# Patient Record
Sex: MALE | Race: WHITE | NOT HISPANIC OR LATINO | Employment: FULL TIME | URBAN - NONMETROPOLITAN AREA
[De-identification: names, ages, dates, MRNs, and addresses within clinical notes are randomized per-mention and may not be internally consistent; named-entity substitution may affect disease eponyms.]

---

## 2019-11-21 ENCOUNTER — CONSULT (OUTPATIENT)
Dept: CARDIOLOGY | Facility: CLINIC | Age: 79
End: 2019-11-21

## 2019-11-21 VITALS
BODY MASS INDEX: 27.62 KG/M2 | HEIGHT: 67 IN | SYSTOLIC BLOOD PRESSURE: 140 MMHG | OXYGEN SATURATION: 99 % | HEART RATE: 74 BPM | DIASTOLIC BLOOD PRESSURE: 74 MMHG | WEIGHT: 176 LBS

## 2019-11-21 DIAGNOSIS — R55 SYNCOPE AND COLLAPSE: Primary | ICD-10-CM

## 2019-11-21 DIAGNOSIS — I50.32 CHRONIC DIASTOLIC HEART FAILURE (HCC): ICD-10-CM

## 2019-11-21 DIAGNOSIS — I10 HYPERTENSION, UNSPECIFIED TYPE: ICD-10-CM

## 2019-11-21 PROCEDURE — 99204 OFFICE O/P NEW MOD 45 MIN: CPT | Performed by: INTERNAL MEDICINE

## 2019-11-21 PROCEDURE — 93000 ELECTROCARDIOGRAM COMPLETE: CPT | Performed by: INTERNAL MEDICINE

## 2019-11-21 RX ORDER — SPIRONOLACTONE 25 MG/1
25 TABLET ORAL DAILY
Refills: 0 | COMMUNITY
Start: 2019-09-30 | End: 2020-02-27

## 2019-11-21 RX ORDER — LISINOPRIL 40 MG/1
40 TABLET ORAL DAILY
Refills: 1 | COMMUNITY
Start: 2019-10-24 | End: 2020-02-27 | Stop reason: SDUPTHER

## 2019-11-21 RX ORDER — SODIUM, POTASSIUM,MAG SULFATES 17.5-3.13G
SOLUTION, RECONSTITUTED, ORAL ORAL
Refills: 0 | COMMUNITY
Start: 2019-11-18 | End: 2021-08-26 | Stop reason: ALTCHOICE

## 2019-11-21 RX ORDER — HYDRALAZINE HYDROCHLORIDE 25 MG/1
25 TABLET, FILM COATED ORAL 2 TIMES DAILY
Refills: 1 | COMMUNITY
Start: 2019-11-15 | End: 2019-11-21

## 2019-11-21 RX ORDER — AMLODIPINE BESYLATE 5 MG/1
5 TABLET ORAL DAILY
Refills: 0 | COMMUNITY
Start: 2019-09-23 | End: 2020-02-27 | Stop reason: SDUPTHER

## 2019-11-21 NOTE — PROGRESS NOTES
[facility] Cardiology OP Consult Note    Abebe Crockett  [id number]  2019    Referred By: Ricardo Staples MD    Chief Complaint: Syncope    History of Present Illness:   Mr. Abebe Crockett is a 79 y.o. male who presents to the Cardiology Clinic for evaluation of syncope.  The patient has a past medical history for hypertension, dyslipidemia, ARIELLA, and anemia.  He has a reported cardiac history significant for chronic diastolic heart failure, however on further questioning the patient denies knowledge of a diagnosis of heart failure.  He does, however, report seeing a cardiologist in the past for management of hypertension.  He presents to the [facility] cardiology clinic today to reestablish primary cardiac care.  The patient reports a history of recurrent syncope, with his last syncopal episode being approximately 1 week ago.  Prior to his most recent episode, he reports several syncopal episodes which occurred several years ago.  Patient reports his most recent syncopal episode occurred while standing.  The patient has been standing for approximately 30 minutes, when he became dizzy and lightheaded.  He subsequently syncopized.  No reported seizure-like activity.  Since that time, he has been decreasing his dose of hydralazine and he has not had any further syncopal episodes.  He denies symptoms to suggest orthostasis.  He reports mild chronic exertional dyspnea, however no exertional chest pain or chest discomfort.  He denies orthopnea.  No significant lower extremity swelling.  No episodes of palpitations.  He has no other complaints at this time.    Past Cardiac Testin. Last Coronary Angio: None  2. Prior Stress Testing: None  3. Last Echo: Remote, report unavailable  4. Prior Holter Monitor: None    Review of Systems:   Review of Systems   Constitutional: Negative for activity change, appetite change, chills, diaphoresis, fatigue, fever, unexpected weight gain and unexpected  weight loss.   Respiratory: Positive for shortness of breath. Negative for cough, chest tightness and wheezing.    Cardiovascular: Negative for chest pain, palpitations and leg swelling.   Gastrointestinal: Negative for abdominal pain, anal bleeding, blood in stool and GERD.   Endocrine: Negative for cold intolerance and heat intolerance.   Genitourinary: Negative for hematuria.   Neurological: Positive for syncope. Negative for dizziness, weakness and light-headedness.   Hematological: Does not bruise/bleed easily.   Psychiatric/Behavioral: Negative for depressed mood and stress. The patient is not nervous/anxious.        Past Medical History:   Past Medical History:   Diagnosis Date   • Hypertension        Past Surgical History: History reviewed. No pertinent surgical history.    Family History:   Family History   Problem Relation Age of Onset   • Cancer Father        Social History:   Social History     Socioeconomic History   • Marital status:      Spouse name: Not on file   • Number of children: Not on file   • Years of education: Not on file   • Highest education level: Not on file   Tobacco Use   • Smoking status: Former Smoker     Last attempt to quit: 1965     Years since quittin.9   • Smokeless tobacco: Never Used   Substance and Sexual Activity   • Alcohol use: No     Frequency: Never   • Drug use: No   • Sexual activity: Defer       Medications:     Current Outpatient Medications:   •  amLODIPine (NORVASC) 5 MG tablet, Take 5 mg by mouth Daily., Disp: , Rfl: 0  •  ASPIRIN 81 PO, Take  by mouth., Disp: , Rfl:   •  BISOPROLOL FUMARATE PO, Take 10 mg by mouth., Disp: , Rfl:   •  lisinopril (PRINIVIL,ZESTRIL) 40 MG tablet, Take 40 mg by mouth Daily., Disp: , Rfl: 1  •  NON FORMULARY, , Disp: , Rfl:   •  NON FORMULARY, , Disp: , Rfl:   •  spironolactone (ALDACTONE) 25 MG tablet, Take 25 mg by mouth Daily., Disp: , Rfl: 0  •  SUPREP BOWEL PREP KIT 17.5-3.13-1.6 GM/177ML solution oral solution, TAKE  "AS DIRECTED PER YOUR PHYSICIANS INSTRUCTIONS, Disp: , Rfl: 0    Allergies:   No Known Allergies    Physical Exam:  Vital Signs:   Vitals:    11/21/19 1033 11/21/19 1037   BP: 122/74 140/74   BP Location: Right arm Left arm   Patient Position: Sitting Sitting   Cuff Size: Adult Adult   Pulse: 74    SpO2: 99%    Weight: 79.8 kg (176 lb)    Height: 170.2 cm (67\")        Physical Exam   Constitutional: He is oriented to person, place, and time. He appears well-developed and well-nourished. No distress.   HENT:   Head: Normocephalic and atraumatic.   Moist Mucous Membranes.    Eyes: EOM are normal. Pupils are equal, round, and reactive to light. No scleral icterus.   Neck: No tracheal deviation present.   Cardiovascular: Normal rate, regular rhythm, normal heart sounds and intact distal pulses. Exam reveals no gallop and no friction rub.   No murmur heard.  Normal JVD.  No lower extremity edema.   Pulmonary/Chest: Effort normal and breath sounds normal. No stridor. No respiratory distress. He has no wheezes. He has no rales. He exhibits no tenderness.   Abdominal: Soft. Bowel sounds are normal. He exhibits no distension. There is no tenderness. There is no rebound and no guarding.   Musculoskeletal: Normal range of motion. He exhibits no edema.   Lymphadenopathy:     He has no cervical adenopathy.   Neurological: He is alert and oriented to person, place, and time.   Skin: Skin is warm and dry. He is not diaphoretic. No erythema.   Psychiatric: He has a normal mood and affect. His behavior is normal.       Results Review:   I reviewed the patient's new clinical results.      ECG 12 Lead  Date/Time: 11/21/2019 12:34 PM  Performed by: Adeel Myles MD  Authorized by: Adeel Myles MD   Comparison: not compared with previous ECG   Rhythm: sinus rhythm  Ectopy: atrial premature contractions  Other findings: non-specific ST-T wave changes    Clinical impression: abnormal EKG            Assessment / Plan:     1.  " Syncope and collapse  --History of recurrent syncope, one recent episode in the remote is been remote several years ago  --Underlying etiology currently unclear  --ECG without significant AV conduction abnormalities  --Will obtain echocardiogram to evaluate LVEF and rule out valvulopathy  --At this point, potential etiologies include orthostatic, vagally mediated, possible arrhythmia  --If echocardiogram is unremarkable and the patient has recurrent episodes, will then pursue outpatient cardiac monitoring  --Titrate antihypertensives, as below  --Follow-up in 3 months, or sooner if required    2.  Hypertension  --BP appears to be well controlled on home BP checks  --Will continue amlodipine, Aldactone, lisinopril  --Discontinue hydralazine  --If the patient is hypertensive after discontinuing hydralazine, would then uptitrate amlodipine    3.  Chronic diastolic heart failure  --Unclear if the patient has a history of diastolic heart failure, given he denies knowledge of the diagnosis, no hospitalizations for fluid overload  --Currently appears to be euvolemic on exam  --Will obtain echocardiogram to evaluate diastolic function      Preventative Cardiology:   Tobacco Cessation: N/A  Obstructive Sleep Apnea Screening: Deffered  AAA Screening: N/A  Peripheral Arterial Disease Screening: N/A    Follow Up:   Return in about 3 months (around 2/21/2020).      Thank you for allowing me to participate in the care of your patient. Please to not hesitate to contact me with additional questions or concerns.     FLORES Myles MD  Interventional Cardiology   11/21/2019  10:45 AM

## 2019-12-24 ENCOUNTER — TELEPHONE (OUTPATIENT)
Dept: CARDIOLOGY | Facility: CLINIC | Age: 79
End: 2019-12-24

## 2019-12-24 NOTE — TELEPHONE ENCOUNTER
----- Message from Adeel Myles MD sent at 12/23/2019  9:43 AM EST -----  Please let the patient know his echocardiogram looked good overall with no significant abnormalities. If he has another syncopal episode, he should let us know and we will arrange for outpatient cardiac monitoring. Otherwise, we will discuss the results in more detail at the time of his follow-up appointment.     Thanks

## 2020-02-27 ENCOUNTER — OFFICE VISIT (OUTPATIENT)
Dept: CARDIOLOGY | Facility: CLINIC | Age: 80
End: 2020-02-27

## 2020-02-27 VITALS
DIASTOLIC BLOOD PRESSURE: 76 MMHG | HEIGHT: 67 IN | TEMPERATURE: 97.8 F | HEART RATE: 82 BPM | WEIGHT: 181.6 LBS | OXYGEN SATURATION: 99 % | SYSTOLIC BLOOD PRESSURE: 132 MMHG | BODY MASS INDEX: 28.5 KG/M2

## 2020-02-27 DIAGNOSIS — I51.89 DIASTOLIC DYSFUNCTION: ICD-10-CM

## 2020-02-27 DIAGNOSIS — R55 SYNCOPE AND COLLAPSE: ICD-10-CM

## 2020-02-27 DIAGNOSIS — I10 ESSENTIAL HYPERTENSION: Primary | ICD-10-CM

## 2020-02-27 PROCEDURE — 99213 OFFICE O/P EST LOW 20 MIN: CPT | Performed by: INTERNAL MEDICINE

## 2020-02-27 RX ORDER — AMLODIPINE BESYLATE 10 MG/1
10 TABLET ORAL DAILY
Qty: 90 TABLET | Refills: 3 | Status: SHIPPED | OUTPATIENT
Start: 2020-02-27 | End: 2021-03-01

## 2020-02-27 RX ORDER — LISINOPRIL 40 MG/1
40 TABLET ORAL DAILY
Qty: 90 TABLET | Refills: 6 | Status: SHIPPED | OUTPATIENT
Start: 2020-02-27 | End: 2021-04-12

## 2020-02-27 NOTE — PROGRESS NOTES
[facility] Cardiology Office Follow Up Note    Abebe Crockett  [id number]  2020    Primary Care Provider: Ricardo Staples MD    Chief Complaint: Regular follow-up    History of Present Illness:   Mr. Abebe Crockett is a 79 y.o. male who presents to the Cardiology Clinic for regular follow-up.  The patient has a past medical history for hypertension, dyslipidemia, ARIELLA, and anemia.   He has a past cardiac history significant for diastolic dysfunction, however it does not appear he has any history of symptomatic diastolic heart failure.  He initially presented to the cardiology clinic for evaluation of a syncopal episode, possibly orthostatic in etiology.  A subsequent echocardiogram showed normal LV systolic function with grade 1 diastolic dysfunction and no significant valvular abnormalities.  He returns today for regular follow-up.  Since his last appointment, the patient denies any recurrent episodes of syncope.  Patient reports he is been well, with no significant changes in his health.  On review of his home BP log, his systolic BP remains well controlled.  He denies exertional chest pain or discomfort.  No significant exertional dyspnea.  No palpitations, presyncopal, or syncopal events.  Denies orthopnea, PND, or lower extremity swelling.  No specific complaints at this time.      Past Cardiac Testin. Last Coronary Angio: None  2. Prior Stress Testing: None  3. Last Echo:  2019   1.  Normal left ventricular size and systolic function, LVEF 65-70%.   2.  Mild concentric LVH.   3.  Impaired LV diastolic filling consistent with grade 1 diastolic dysfunction.   4.  Normal right ventricular size and systolic function.   5.  Increased left atrial volume.   6.  Trace MR, TR, PI.   7.  Mild aortic sclerosis without stenosis.  4. Prior Holter Monitor: None    Review of Systems:   Review of Systems   Constitutional: Negative for activity change, appetite change, chills,  "diaphoresis, fatigue, fever, unexpected weight gain and unexpected weight loss.   Respiratory: Negative for cough, chest tightness, shortness of breath and wheezing.    Cardiovascular: Negative for chest pain, palpitations and leg swelling.   Gastrointestinal: Negative for abdominal pain, anal bleeding, blood in stool and GERD.   Endocrine: Negative for cold intolerance and heat intolerance.   Genitourinary: Negative for hematuria.   Neurological: Negative for dizziness, syncope, weakness and light-headedness.   Hematological: Does not bruise/bleed easily.   Psychiatric/Behavioral: Negative for depressed mood and stress. The patient is not nervous/anxious.        I have reviewed and/or updated the patient's past medical, past surgical, family, social history, problem list and allergies as appropriate.     Medications:     Current Outpatient Medications:   •  amLODIPine (NORVASC) 10 MG tablet, Take 1 tablet by mouth Daily., Disp: 90 tablet, Rfl: 3  •  ASPIRIN 81 PO, Take  by mouth., Disp: , Rfl:   •  lisinopril (PRINIVIL,ZESTRIL) 40 MG tablet, Take 1 tablet by mouth Daily., Disp: 90 tablet, Rfl: 6  •  NON FORMULARY, , Disp: , Rfl:   •  NON FORMULARY, , Disp: , Rfl:   •  NON FORMULARY, , Disp: , Rfl:   •  BISOPROLOL FUMARATE PO, Take 10 mg by mouth., Disp: , Rfl:   •  SUPREP BOWEL PREP KIT 17.5-3.13-1.6 GM/177ML solution oral solution, TAKE AS DIRECTED PER YOUR PHYSICIANS INSTRUCTIONS, Disp: , Rfl: 0    Physical Exam:  Vital Signs:   Vitals:    02/27/20 1527   BP: 132/76   BP Location: Right arm   Patient Position: Sitting   Cuff Size: Adult   Pulse: 82   Temp: 97.8 °F (36.6 °C)   TempSrc: Temporal   SpO2: 99%   Weight: 82.4 kg (181 lb 9.6 oz)   Height: 170.2 cm (67\")       Physical Exam   Constitutional: He is oriented to person, place, and time. He appears well-developed and well-nourished. No distress.   HENT:   Head: Normocephalic and atraumatic.   Moist Mucous Membranes.    Eyes: Pupils are equal, round, and " reactive to light. EOM are normal. No scleral icterus.   Neck: No tracheal deviation present.   Cardiovascular: Normal rate, regular rhythm, normal heart sounds and intact distal pulses. Exam reveals no gallop and no friction rub.   No murmur heard.  Normal JVD.   Pulmonary/Chest: Effort normal and breath sounds normal. No stridor. No respiratory distress. He has no wheezes. He has no rales. He exhibits no tenderness.   Abdominal: Soft. Bowel sounds are normal. He exhibits no distension. There is no tenderness. There is no rebound and no guarding.   Musculoskeletal: Normal range of motion. He exhibits no edema.   Lymphadenopathy:     He has no cervical adenopathy.   Neurological: He is alert and oriented to person, place, and time.   Skin: Skin is warm and dry. He is not diaphoretic. No erythema.   Psychiatric: He has a normal mood and affect. His behavior is normal.       Results Review:   I reviewed the patient's new clinical results.      Assessment / Plan:     1.  Hypertension  --BP appears to be well controlled on home BP checks  --Will attempt to optimize antihypertensives with discontinuation of Aldactone and up titration of amlodipine to 10 mg p.o. Daily  --Continue lisinopril 40 mg p.o. Daily  --If BP trends up on home BP checks by up titration of amlodipine, would then add back Aldactone 25 mg p.o. Daily    2.  Syncope  --Initially presented for evaluation of a syncopal episode, possibly orthostatic versus vagal in etiology  --Echocardiogram showed normal LV systolic function, no significant valvular abnormalities  --No recurrent episodes  --If recurrent episode of syncope, would then pursue outpatient cardiac monitoring     3.    Diastolic dysfunction  --Echocardiogram showed grade 1 diastolic dysfunction  --Does not appear to have a history of symptomatic diastolic heart failure  --Euvolemic on exam today        Preventative Cardiology:   Tobacco Cessation: N/A  Obstructive Sleep Apnea Screening:  Deffered  AAA Screening: N/A  Peripheral Arterial Disease Screening: N/A      Follow Up:   Return in about 6 months (around 8/27/2020).      Thank you for allowing me to participate in the care of your patient. Please to not hesitate to contact me with additional questions or concerns.     FLORES Myles MD  Interventional Cardiology   02/27/2020  3:48 PM

## 2020-02-28 PROBLEM — R55 SYNCOPE AND COLLAPSE: Status: ACTIVE | Noted: 2020-02-28

## 2020-02-28 PROBLEM — I51.89 DIASTOLIC DYSFUNCTION: Status: ACTIVE | Noted: 2019-11-21

## 2020-08-27 ENCOUNTER — OFFICE VISIT (OUTPATIENT)
Dept: CARDIOLOGY | Facility: CLINIC | Age: 80
End: 2020-08-27

## 2020-08-27 VITALS
WEIGHT: 174 LBS | HEART RATE: 61 BPM | DIASTOLIC BLOOD PRESSURE: 74 MMHG | BODY MASS INDEX: 27.31 KG/M2 | HEIGHT: 67 IN | SYSTOLIC BLOOD PRESSURE: 120 MMHG | OXYGEN SATURATION: 97 %

## 2020-08-27 DIAGNOSIS — I10 ESSENTIAL HYPERTENSION: ICD-10-CM

## 2020-08-27 DIAGNOSIS — I51.89 DIASTOLIC DYSFUNCTION: ICD-10-CM

## 2020-08-27 DIAGNOSIS — R55 SYNCOPE AND COLLAPSE: Primary | ICD-10-CM

## 2020-08-27 PROCEDURE — 99213 OFFICE O/P EST LOW 20 MIN: CPT | Performed by: INTERNAL MEDICINE

## 2020-08-27 NOTE — PROGRESS NOTES
[facility] Cardiology Office Follow Up Note    Abebe Crockett  [id number]  2020    Primary Care Provider: Ricardo Staples MD    Chief Complaint: Regular follow-up    History of Present Illness:   Mr. Abebe Crockett is a 79 y.o. male who presents to the Cardiology Clinic for regular follow-up.  The patient has a past medical history for hypertension, dyslipidemia, ARIELLA, and anemia.   He has a past cardiac history significant for diastolic dysfunction, however it does not appear he has any history of symptomatic diastolic heart failure.  He initially presented to the cardiology clinic for evaluation of a syncopal episode, possibly orthostatic in etiology.  A subsequent echocardiogram showed normal LV systolic function with grade 1 diastolic dysfunction and no significant valvular abnormalities.  He presents to the cardiology clinic today for regular follow-up.  Since his last appointment, the patient notes he has been well.  He denies any recurrent syncopal episodes.  No dizziness, lightheadedness, or presyncopal symptoms.  He denies palpitations.  No exertional chest pain or chest discomfort.  No orthopnea, PND, or lower extremity swelling.  He denies any recent significant changes in his health.  He currently has no complaints.     Past Cardiac Testin. Last Coronary Angio: None  2. Prior Stress Testing: None  3. Last Echo:  2019              1.  Normal left ventricular size and systolic function, LVEF 65-70%.              2.  Mild concentric LVH.              3.  Impaired LV diastolic filling consistent with grade 1 diastolic dysfunction.              4.  Normal right ventricular size and systolic function.              5.  Increased left atrial volume.              6.  Trace MR, TR, PI.              7.  Mild aortic sclerosis without stenosis.  4. Prior Holter Monitor: None    Review of Systems:   Review of Systems   Constitutional: Negative for activity change, appetite  "change, chills, diaphoresis, fatigue, fever, unexpected weight gain and unexpected weight loss.   Eyes: Negative for blurred vision and double vision.   Respiratory: Negative for cough, chest tightness, shortness of breath and wheezing.    Cardiovascular: Negative for chest pain, palpitations and leg swelling.   Gastrointestinal: Negative for abdominal pain, anal bleeding, blood in stool and GERD.   Endocrine: Negative for cold intolerance and heat intolerance.   Genitourinary: Negative for hematuria.   Neurological: Negative for dizziness, syncope, weakness and light-headedness.   Hematological: Does not bruise/bleed easily.   Psychiatric/Behavioral: Negative for depressed mood and stress. The patient is not nervous/anxious.        I have reviewed and/or updated the patient's past medical, past surgical, family, social history, problem list and allergies as appropriate.     Medications:     Current Outpatient Medications:   •  amLODIPine (NORVASC) 10 MG tablet, Take 1 tablet by mouth Daily., Disp: 90 tablet, Rfl: 3  •  ASPIRIN 81 PO, Take  by mouth., Disp: , Rfl:   •  lisinopril (PRINIVIL,ZESTRIL) 40 MG tablet, Take 1 tablet by mouth Daily., Disp: 90 tablet, Rfl: 6  •  NON FORMULARY, , Disp: , Rfl:   •  NON FORMULARY, , Disp: , Rfl:   •  NON FORMULARY, , Disp: , Rfl:   •  BISOPROLOL FUMARATE PO, Take 10 mg by mouth., Disp: , Rfl:   •  SUPREP BOWEL PREP KIT 17.5-3.13-1.6 GM/177ML solution oral solution, TAKE AS DIRECTED PER YOUR PHYSICIANS INSTRUCTIONS, Disp: , Rfl: 0    Physical Exam:  Vital Signs:   Vitals:    08/27/20 1404   BP: 120/74   BP Location: Right arm   Patient Position: Sitting   Cuff Size: Adult   Pulse: 61   SpO2: 97%   Weight: 78.9 kg (174 lb)   Height: 170.2 cm (67\")       Physical Exam   Constitutional: He is oriented to person, place, and time. He appears well-developed and well-nourished. No distress.   HENT:   Head: Normocephalic and atraumatic.   Moist Mucous Membranes.    Eyes: Pupils are " equal, round, and reactive to light. EOM are normal. No scleral icterus.   Neck: No tracheal deviation present.   Cardiovascular: Normal rate, regular rhythm, normal heart sounds and intact distal pulses. Exam reveals no gallop and no friction rub.   No murmur heard.  Normal JVD.   Pulmonary/Chest: Effort normal and breath sounds normal. No stridor. No respiratory distress. He has no wheezes. He has no rales. He exhibits no tenderness.   Abdominal: Soft. Bowel sounds are normal. He exhibits no distension. There is no tenderness. There is no rebound and no guarding.   Musculoskeletal: Normal range of motion. He exhibits no edema.   Lymphadenopathy:     He has no cervical adenopathy.   Neurological: He is alert and oriented to person, place, and time.   Skin: Skin is warm and dry. He is not diaphoretic. No erythema.   Psychiatric: He has a normal mood and affect. His behavior is normal.       Results Review:   I reviewed the patient's new clinical results.        Assessment / Plan:     1. Syncope  --Initially presented for evaluation of a syncopal episode, suspected orthostatic versus vagal in etiology  --Echocardiogram showed normal LV systolic function, no significant valvular abnormalities  --No recurrent episodes or current orthostatic symptoms  --If additional syncopal episode, would then pursue outpatient cardiac monitoring  --Follow-up in 1 year, or sooner if required    2.  Hypertension  --Currently well controlled  --Continue current antihypertensives    3.  Diastolic dysfunction  --Echocardiogram showed grade 1 diastolic dysfunction  --Euvolemic today        Preventative Cardiology:   Tobacco Cessation: N/A  Obstructive Sleep Apnea Screening: Deffered  AAA Screening: N/A  Peripheral Arterial Disease Screening: N/A      Follow Up:   Return in about 1 year (around 8/27/2021).      Thank you for allowing me to participate in the care of your patient. Please to not hesitate to contact me with additional  questions or concerns.     FLORES Myles MD  Interventional Cardiology   08/27/2020  2:11 PM

## 2021-03-01 RX ORDER — AMLODIPINE BESYLATE 10 MG/1
TABLET ORAL
Qty: 90 TABLET | Refills: 0 | Status: SHIPPED | OUTPATIENT
Start: 2021-03-01 | End: 2021-06-01

## 2021-04-12 DIAGNOSIS — I10 ESSENTIAL HYPERTENSION: Primary | ICD-10-CM

## 2021-04-12 RX ORDER — LISINOPRIL 40 MG/1
TABLET ORAL
Qty: 90 TABLET | Refills: 3 | Status: SHIPPED | OUTPATIENT
Start: 2021-04-12

## 2021-06-01 RX ORDER — AMLODIPINE BESYLATE 10 MG/1
TABLET ORAL
Qty: 90 TABLET | Refills: 3 | Status: SHIPPED | OUTPATIENT
Start: 2021-06-01 | End: 2022-07-11

## 2021-08-26 ENCOUNTER — OFFICE VISIT (OUTPATIENT)
Dept: CARDIOLOGY | Facility: CLINIC | Age: 81
End: 2021-08-26

## 2021-08-26 VITALS
HEART RATE: 68 BPM | RESPIRATION RATE: 16 BRPM | DIASTOLIC BLOOD PRESSURE: 70 MMHG | SYSTOLIC BLOOD PRESSURE: 126 MMHG | WEIGHT: 177 LBS | BODY MASS INDEX: 27.78 KG/M2 | HEIGHT: 67 IN | OXYGEN SATURATION: 98 %

## 2021-08-26 DIAGNOSIS — I10 ESSENTIAL HYPERTENSION: Primary | ICD-10-CM

## 2021-08-26 DIAGNOSIS — I51.89 DIASTOLIC DYSFUNCTION: ICD-10-CM

## 2021-08-26 DIAGNOSIS — R55 SYNCOPE AND COLLAPSE: ICD-10-CM

## 2021-08-26 PROCEDURE — 99213 OFFICE O/P EST LOW 20 MIN: CPT | Performed by: INTERNAL MEDICINE

## 2021-08-26 NOTE — PROGRESS NOTES
[facility] Cardiology Office Follow Up Note    Abebe Crockett  [id number]  2021    Primary Care Provider: Ricardo Staples MD    Chief Complaint: Routine follow-up    History of Present Illness:   Mr. Abebe Crockett is a 81y.o. male who presents to the Cardiology Clinic for regular follow-up.  The patient has a past medical history significant for hypertension, dyslipidemia, ARIELLA, and anemia.   He has a past cardiac history significant for diastolic dysfunction, without a history of clinical CHF.  He initially presented to the cardiology clinic for evaluation of a syncopal episode, possibly orthostatic in etiology.  A subsequent echocardiogram showed normal LV systolic function with grade 1 diastolic dysfunction and no significant valvular abnormalities.  He subsequently underwent de-escalation of his antihypertensive medications, and today the patient denies any recurrent syncopal episodes since his last follow-up.  He denies any significant episodes of dizziness or lightheadedness.  He keeps a strict blood pressure log at home, which shows excellent blood pressure control with a systolic BP typically in the 120s.  He denies chest pain or exertional anginal symptoms, and reports he remains active working around his home.  No significant orthopnea, PND, or lower extremity swelling.  No exertional dyspnea.  No specific complaints today.    Past Cardiac Testin. Last Coronary Angio: None  2. Prior Stress Testing: None  3. Last Echo:  2019              1.  Normal left ventricular size and systolic function, LVEF 65-70%.              2.  Mild concentric LVH.              3.  Impaired LV diastolic filling consistent with grade 1 diastolic dysfunction.              4.  Normal right ventricular size and systolic function.              5.  Increased left atrial volume.              6.  Trace MR, TR, PI.              7.  Mild aortic sclerosis without stenosis.  4. Prior Holter  "Monitor: None    Review of Systems:   Review of Systems   Constitutional: Negative for activity change, appetite change, chills, diaphoresis, fatigue, fever, unexpected weight gain and unexpected weight loss.   Eyes: Negative for blurred vision and double vision.   Respiratory: Negative for cough, chest tightness, shortness of breath and wheezing.    Cardiovascular: Negative for chest pain, palpitations and leg swelling.   Gastrointestinal: Negative for abdominal pain, anal bleeding, blood in stool and GERD.   Endocrine: Negative for cold intolerance and heat intolerance.   Genitourinary: Negative for hematuria.   Neurological: Negative for dizziness, syncope, weakness and light-headedness.   Hematological: Does not bruise/bleed easily.   Psychiatric/Behavioral: Negative for depressed mood and stress. The patient is not nervous/anxious.        I have reviewed and/or updated the patient's past medical, past surgical, family, social history, problem list and allergies as appropriate.     Medications:     Current Outpatient Medications:   •  amLODIPine (NORVASC) 10 MG tablet, Take 1 tablet by mouth once daily, Disp: 90 tablet, Rfl: 3  •  ASPIRIN 81 PO, Take  by mouth., Disp: , Rfl:   •  lisinopril (PRINIVIL,ZESTRIL) 40 MG tablet, Take 1 tablet by mouth once daily, Disp: 90 tablet, Rfl: 3  •  NON FORMULARY, , Disp: , Rfl:   •  NON FORMULARY, , Disp: , Rfl:   •  NON FORMULARY, , Disp: , Rfl:   •  vitamin D3 125 MCG (5000 UT) capsule capsule, Take 5,000 Units by mouth Daily., Disp: , Rfl:     Physical Exam:  Vital Signs:   Vitals:    08/26/21 1259   BP: 126/70   BP Location: Right arm   Patient Position: Sitting   Pulse: 68   Resp: 16   SpO2: 98%   Weight: 80.3 kg (177 lb)   Height: 170.2 cm (67\")       Physical Exam  Constitutional:       General: He is not in acute distress.     Appearance: Normal appearance. He is well-developed. He is not diaphoretic.   HENT:      Head: Normocephalic and atraumatic.   Eyes:      " General: No scleral icterus.     Pupils: Pupils are equal, round, and reactive to light.   Neck:      Trachea: No tracheal deviation.   Cardiovascular:      Rate and Rhythm: Normal rate and regular rhythm.      Heart sounds: Normal heart sounds. No murmur heard.   No friction rub. No gallop.       Comments: Normal JVD.  Pulmonary:      Effort: Pulmonary effort is normal. No respiratory distress.      Breath sounds: Normal breath sounds. No stridor. No wheezing or rales.   Chest:      Chest wall: No tenderness.   Abdominal:      General: Bowel sounds are normal. There is no distension.      Palpations: Abdomen is soft.      Tenderness: There is no abdominal tenderness. There is no guarding or rebound.   Musculoskeletal:         General: No swelling. Normal range of motion.      Cervical back: Neck supple. No tenderness.   Lymphadenopathy:      Cervical: No cervical adenopathy.   Skin:     General: Skin is warm and dry.      Findings: No erythema.   Neurological:      General: No focal deficit present.      Mental Status: He is alert and oriented to person, place, and time.   Psychiatric:         Mood and Affect: Mood normal.         Behavior: Behavior normal.         Results Review:   I reviewed the patient's new clinical results.      Assessment / Plan:     1. Syncope  --Initially presented for evaluation of a syncopal episode, suspected orthostatic versus vagal in etiology  --Echocardiogram showed normal LV systolic function, no significant valvular abnormalities  --No recurrent episodes after de-escalation of antihypertensive medications  --Given no recurrent episodes, no indication for further work-up at this time     2.  Hypertension  --Well controlled on review of home BP log  --Will continue current antihypertensives     3.  Diastolic dysfunction  --Prior echocardiogram showed grade 1 diastolic dysfunction  --No history of clinical/symptomatic CHF  --Euvolemic on exam today        Preventative Cardiology:    Tobacco Cessation: N/A  Obstructive Sleep Apnea Screening: Deffered  AAA Screening: N/A  Peripheral Arterial Disease Screening: N/A    Follow Up:   Return in about 1 year (around 8/26/2022).      Thank you for allowing me to participate in the care of your patient. Please to not hesitate to contact me with additional questions or concerns.     FLORES Myles MD  Interventional Cardiology   08/26/2021  13:05 EDT

## 2021-08-31 ENCOUNTER — LAB (OUTPATIENT)
Dept: LAB | Facility: HOSPITAL | Age: 81
End: 2021-08-31

## 2021-08-31 PROCEDURE — 80061 LIPID PANEL: CPT | Performed by: INTERNAL MEDICINE

## 2021-08-31 PROCEDURE — 80053 COMPREHEN METABOLIC PANEL: CPT | Performed by: INTERNAL MEDICINE

## 2021-08-31 PROCEDURE — 85025 COMPLETE CBC W/AUTO DIFF WBC: CPT | Performed by: INTERNAL MEDICINE

## 2021-09-03 RX ORDER — ATORVASTATIN CALCIUM 20 MG/1
20 TABLET, FILM COATED ORAL DAILY
Qty: 90 TABLET | Refills: 3 | Status: SHIPPED | OUTPATIENT
Start: 2021-09-03 | End: 2022-10-12

## 2021-09-13 ENCOUNTER — TELEPHONE (OUTPATIENT)
Dept: CARDIOLOGY | Facility: CLINIC | Age: 81
End: 2021-09-13

## 2021-09-13 NOTE — TELEPHONE ENCOUNTER
His LDL is 129.  It looks like the patient was prescribed a conservative/low-dose of atorvastatin.  Dr. Myles is out of the office this week, but I would say the benefits of taking this medication outweigh the risks.  I would suggest seeing his PCP for a mini mental exam prior to starting statin therapy, to provide a baseline of mental status.  If he still has concerns after speaking to him, please forward this encounter to Dr. Myles who can address this at his convenience.      Thanks.

## 2021-09-13 NOTE — TELEPHONE ENCOUNTER
He states his memory was much better after stopping the Atorvastatin. I will forward this to Dr. Myles for advise.

## 2021-09-13 NOTE — TELEPHONE ENCOUNTER
Patient does not want to take Atorvastatin due to he lost memory last time he took it, he would like to take something else outside the statin family if possible. He states he has never tried any other cholesterol medications.

## 2021-11-29 ENCOUNTER — TELEPHONE (OUTPATIENT)
Dept: CARDIOLOGY | Facility: CLINIC | Age: 81
End: 2021-11-29

## 2021-11-29 NOTE — TELEPHONE ENCOUNTER
"Patient called stating he has been taking Amlodipine 10 mg one tablet QDAY and his PCP recently sent in Amlodipine 5 mg QDAY. Patient wanted to verify which dose to take. Patient states his bp has been running \"good\". 120s/60s. No complaints of dizziness, fatigue, or other symptoms. Advised patient to continue on current dose of 10 mg one tab po QDAY unless symptoms or bp changes. Is this okay?  "

## 2022-06-06 ENCOUNTER — LAB (OUTPATIENT)
Dept: LAB | Facility: HOSPITAL | Age: 82
End: 2022-06-06

## 2022-06-06 ENCOUNTER — TRANSCRIBE ORDERS (OUTPATIENT)
Dept: LAB | Facility: HOSPITAL | Age: 82
End: 2022-06-06

## 2022-06-06 DIAGNOSIS — N40.2 NODULAR PROSTATE WITHOUT URINARY OBSTRUCTION: Primary | ICD-10-CM

## 2022-06-06 DIAGNOSIS — N40.2 NODULAR PROSTATE WITHOUT URINARY OBSTRUCTION: ICD-10-CM

## 2022-06-06 LAB — PSA SERPL-MCNC: 0.83 NG/ML (ref 0–4)

## 2022-06-06 PROCEDURE — 84153 ASSAY OF PSA TOTAL: CPT

## 2022-06-06 PROCEDURE — 36415 COLL VENOUS BLD VENIPUNCTURE: CPT

## 2022-06-07 ENCOUNTER — TRANSCRIBE ORDERS (OUTPATIENT)
Dept: LAB | Facility: HOSPITAL | Age: 82
End: 2022-06-07

## 2022-06-07 DIAGNOSIS — N40.2 NODULAR PROSTATE WITHOUT LOWER URINARY TRACT SYMPTOMS: Primary | ICD-10-CM

## 2022-07-11 RX ORDER — AMLODIPINE BESYLATE 10 MG/1
TABLET ORAL
Qty: 90 TABLET | Refills: 0 | Status: SHIPPED | OUTPATIENT
Start: 2022-07-11 | End: 2022-10-14

## 2022-10-12 ENCOUNTER — OFFICE VISIT (OUTPATIENT)
Dept: CARDIOLOGY | Facility: CLINIC | Age: 82
End: 2022-10-12

## 2022-10-12 VITALS
HEART RATE: 88 BPM | SYSTOLIC BLOOD PRESSURE: 160 MMHG | WEIGHT: 180 LBS | DIASTOLIC BLOOD PRESSURE: 98 MMHG | OXYGEN SATURATION: 99 % | HEIGHT: 68 IN | RESPIRATION RATE: 18 BRPM | BODY MASS INDEX: 27.28 KG/M2

## 2022-10-12 DIAGNOSIS — E78.5 DYSLIPIDEMIA: ICD-10-CM

## 2022-10-12 DIAGNOSIS — R55 SYNCOPE AND COLLAPSE: Primary | ICD-10-CM

## 2022-10-12 DIAGNOSIS — I10 PRIMARY HYPERTENSION: ICD-10-CM

## 2022-10-12 PROCEDURE — 99213 OFFICE O/P EST LOW 20 MIN: CPT | Performed by: INTERNAL MEDICINE

## 2022-10-12 RX ORDER — FENOFIBRATE 145 MG/1
TABLET, COATED ORAL
COMMUNITY
Start: 2022-09-14

## 2022-10-12 RX ORDER — CLOTRIMAZOLE AND BETAMETHASONE DIPROPIONATE 10; .64 MG/G; MG/G
CREAM TOPICAL
COMMUNITY
Start: 2022-08-31

## 2022-10-12 NOTE — PROGRESS NOTES
[facility] Cardiology Office Follow Up Note    Abebe Crockett  [id number]  10/12/2022    Primary Care Provider: Ricardo Staples MD    Chief Complaint: Routine follow-up    History of Present Illness:   Mr. Abebe Crockett is a  82y.o. male who presents to the Cardiology Clinic for routine follow-up.  The patient has a past medical history significant for hypertension, dyslipidemia, ARIELLA, and anemia.   He has a past cardiac history significant for diastolic dysfunction, without a history of clinical CHF.  He initially presented to the cardiology clinic for evaluation of a syncopal episode, possibly orthostatic in etiology.  A subsequent echocardiogram showed normal LV systolic function with grade 1 diastolic dysfunction and no significant valvular abnormalities.  He subsequently underwent de-escalation of his antihypertensive medications, without any recurrent syncopal episodes.  He presents today for routine follow-up.  Since his last appointment, the patient reports he has been well without significant changes in his health.  He has subsequently retired from being a , and remains active working in his yard and maintaining his garden.  He denies chest pain or exertional chest discomfort.  He has not had any recurrent episodes of dizziness, lightheadedness, or syncopal episodes.  He has kept a home blood pressure log, with a systolic BP typically being well controlled in the 120s or lower.  No specific complaints today.    Past Cardiac Testin. Last Coronary Angio: None  2. Prior Stress Testing: None  3. Last Echo:  2019              1.  Normal left ventricular size and systolic function, LVEF 65-70%.              2.  Mild concentric LVH.              3.  Impaired LV diastolic filling consistent with grade 1 diastolic dysfunction.              4.  Normal right ventricular size and systolic function.              5.  Increased left atrial volume.              6.  Trace MR, TR,  PI.              7.  Mild aortic sclerosis without stenosis.  4. Prior Holter Monitor: None    Review of Systems:   Review of Systems   Constitutional: Negative for activity change, appetite change, chills, diaphoresis, fatigue, fever, unexpected weight gain and unexpected weight loss.   Eyes: Negative for blurred vision and double vision.   Respiratory: Negative for cough, chest tightness, shortness of breath and wheezing.    Cardiovascular: Negative for chest pain, palpitations and leg swelling.   Gastrointestinal: Negative for abdominal pain, anal bleeding, blood in stool and GERD.   Endocrine: Negative for cold intolerance and heat intolerance.   Genitourinary: Negative for hematuria.   Neurological: Negative for dizziness, syncope, weakness and light-headedness.   Hematological: Does not bruise/bleed easily.   Psychiatric/Behavioral: Negative for depressed mood and stress. The patient is not nervous/anxious.        I have reviewed and/or updated the patient's past medical, past surgical, family, social history, problem list and allergies as appropriate.     Medications:     Current Outpatient Medications:   •  amLODIPine (NORVASC) 10 MG tablet, Take 1 tablet by mouth once daily, Disp: 90 tablet, Rfl: 0  •  ASPIRIN 81 PO, Take  by mouth., Disp: , Rfl:   •  clotrimazole-betamethasone (LOTRISONE) 1-0.05 % cream, APPLY TO CORNERS OF MOUTH DAILY FOR 10 TO 14 DAYS, Disp: , Rfl:   •  fenofibrate (TRICOR) 145 MG tablet, , Disp: , Rfl:   •  lisinopril (PRINIVIL,ZESTRIL) 40 MG tablet, Take 1 tablet by mouth once daily, Disp: 90 tablet, Rfl: 3  •  NON FORMULARY, , Disp: , Rfl:   •  NON FORMULARY, , Disp: , Rfl:   •  NON FORMULARY, , Disp: , Rfl:   •  vitamin D3 125 MCG (5000 UT) capsule capsule, Take 5,000 Units by mouth Daily., Disp: , Rfl:     Physical Exam:  Vital Signs:   Vitals:    10/12/22 1008   BP: 160/98   BP Location: Left arm   Patient Position: Sitting   Pulse: 88   Resp: 18   SpO2: 99%   Weight: 81.6 kg (180  "lb)   Height: 172.7 cm (68\")       Physical Exam  Constitutional:       General: He is not in acute distress.     Appearance: Normal appearance. He is not diaphoretic.   HENT:      Head: Normocephalic and atraumatic.   Cardiovascular:      Rate and Rhythm: Normal rate and regular rhythm.      Heart sounds: No murmur heard.  Pulmonary:      Effort: Pulmonary effort is normal. No respiratory distress.      Breath sounds: Normal breath sounds. No stridor. No wheezing, rhonchi or rales.   Abdominal:      General: Bowel sounds are normal. There is no distension.      Palpations: Abdomen is soft.      Tenderness: There is no abdominal tenderness. There is no guarding or rebound.   Musculoskeletal:         General: No swelling. Normal range of motion.      Cervical back: Neck supple. No tenderness.   Skin:     General: Skin is warm and dry.   Neurological:      General: No focal deficit present.      Mental Status: He is alert and oriented to person, place, and time.   Psychiatric:         Mood and Affect: Mood normal.         Behavior: Behavior normal.         Results Review:   I reviewed the patient's new clinical results.      Assessment / Plan:     1. Syncope  --Initially presented for evaluation of a syncopal episode, suspected orthostatic versus vagal in etiology  --Echocardiogram showed normal LV systolic function, no significant valvular abnormalities  --Underwent de-escalation of his antihypertensive medications, without recurrent syncopal episodes  --No indication for further work-up at this time     2.  Hypertension  --BP remains well controlled on review of his home BP log  --Continue current antihypertensives     3.  Dyslipidemia  --Last lipid profile available for review in 8/21 showed , HDL 29, triglycerides 162  --Recent lipid profile reportedly showed upward trending triglycerides, subsequently started on fenofibrate  --Planning for repeat lipid profile upon next follow-up with PCP, will obtain " records for review        Preventative Cardiology:   Tobacco Cessation: N/A  Obstructive Sleep Apnea Screening: Deffered  AAA Screening: N/A  Peripheral Arterial Disease Screening: N/A       Follow Up:   Return in about 1 year (around 10/12/2023).      Thank you for allowing me to participate in the care of your patient. Please to not hesitate to contact me with additional questions or concerns.     FLORES Myles MD  Interventional Cardiology   10/12/2022  10:12 EDT

## 2022-10-14 RX ORDER — AMLODIPINE BESYLATE 10 MG/1
TABLET ORAL
Qty: 90 TABLET | Refills: 3 | Status: SHIPPED | OUTPATIENT
Start: 2022-10-14

## 2023-09-15 ENCOUNTER — DOCUMENTATION (OUTPATIENT)
Dept: NUTRITION | Facility: HOSPITAL | Age: 83
End: 2023-09-15
Payer: MEDICARE

## 2023-09-15 ENCOUNTER — HOSPITAL ENCOUNTER (OUTPATIENT)
Dept: DIABETES SERVICES | Facility: HOSPITAL | Age: 83
Setting detail: RECURRING SERIES
Discharge: HOME | End: 2023-09-15
Payer: MEDICARE

## 2023-09-15 NOTE — PROGRESS NOTES
Nutrition Services    Patient Name:  Abebe Crockett  YOB: 1940  MRN: [id number]  Admit Date:  (Not on file)    Patient attended Type II Diabetes Nutrition Education 90 minute group class today  with RD only-in person. Epic users, please see media tab for assessment and notes. Non-Epic users, assessment and notes will be sent per protocol.    Electronically signed by:  Bell Pan RD  09/15/23 16:50 EDT

## 2023-10-18 ENCOUNTER — HOSPITAL ENCOUNTER (OUTPATIENT)
Dept: DIABETES SERVICES | Facility: HOSPITAL | Age: 83
Setting detail: RECURRING SERIES
Discharge: HOME | End: 2023-10-18
Payer: MEDICARE

## 2023-10-18 NOTE — PROGRESS NOTES
Patient seen for DM nutrition education follow-up class.RD spent 15 minutes with patient via telephone today. Epic users--full notes will appear under media tab. Non Epic users--notes will be forwarded per protocol.

## 2023-12-18 ENCOUNTER — OFFICE VISIT (OUTPATIENT)
Dept: CARDIOLOGY | Facility: CLINIC | Age: 83
End: 2023-12-18
Payer: MEDICARE

## 2023-12-18 VITALS
BODY MASS INDEX: 27 KG/M2 | HEIGHT: 66 IN | OXYGEN SATURATION: 96 % | WEIGHT: 168 LBS | SYSTOLIC BLOOD PRESSURE: 108 MMHG | HEART RATE: 72 BPM | DIASTOLIC BLOOD PRESSURE: 60 MMHG

## 2023-12-18 DIAGNOSIS — R55 SYNCOPE AND COLLAPSE: Primary | ICD-10-CM

## 2023-12-18 DIAGNOSIS — I10 PRIMARY HYPERTENSION: ICD-10-CM

## 2023-12-18 PROCEDURE — 99213 OFFICE O/P EST LOW 20 MIN: CPT | Performed by: INTERNAL MEDICINE

## 2023-12-18 PROCEDURE — 3078F DIAST BP <80 MM HG: CPT | Performed by: INTERNAL MEDICINE

## 2023-12-18 PROCEDURE — 3074F SYST BP LT 130 MM HG: CPT | Performed by: INTERNAL MEDICINE

## 2023-12-18 RX ORDER — TAMSULOSIN HYDROCHLORIDE 0.4 MG/1
1 CAPSULE ORAL
COMMUNITY
Start: 2023-10-23

## 2023-12-18 RX ORDER — GLIPIZIDE 2.5 MG/1
2.5 TABLET, EXTENDED RELEASE ORAL DAILY
COMMUNITY

## 2023-12-18 NOTE — PROGRESS NOTES
[facility] Cardiology Office Follow Up Note    Abebe Crockett  [id number]  2023    Primary Care Provider: Dre Henry MD    Chief Complaint: Routine follow-up    History of Present Illness:   Mr. Abebe Crockett is a  83y.o. male who presents to the Cardiology Clinic for routine follow-up.  The patient has a past medical history significant for hypertension, dyslipidemia, ARIELLA, and anemia.   He has a past cardiac history significant for diastolic dysfunction, without a history of clinical CHF.  He returns to cardiology clinic today for routine follow-up.  Since his last appointment, the patient has not had any recurrent episodes of presyncopal or syncopal events.  He denies any orthostatic symptoms.  He has kept a home BP log, with a systolic BP typically being in the 130s.  No chest pain or chest discomfort.  No specific complaints today.     Past Cardiac Testin. Last Coronary Angio: None  2. Prior Stress Testing: None  3. Last Echo:  2019              1.  Normal left ventricular size and systolic function, LVEF 65-70%.              2.  Mild concentric LVH.              3.  Impaired LV diastolic filling consistent with grade 1 diastolic dysfunction.              4.  Normal right ventricular size and systolic function.              5.  Increased left atrial volume.              6.  Trace MR, TR, PI.              7.  Mild aortic sclerosis without stenosis.  4. Prior Holter Monitor: None    Review of Systems:   Review of Systems   Constitutional:  Negative for activity change, appetite change, chills, diaphoresis, fatigue, fever, unexpected weight gain and unexpected weight loss.   Eyes:  Negative for blurred vision and double vision.   Respiratory:  Negative for cough, chest tightness, shortness of breath and wheezing.    Cardiovascular:  Negative for chest pain, palpitations and leg swelling.   Gastrointestinal:  Negative for abdominal pain, anal bleeding, blood in stool  "and GERD.   Endocrine: Negative for cold intolerance and heat intolerance.   Genitourinary:  Negative for hematuria.   Neurological:  Negative for dizziness, syncope, weakness and light-headedness.   Hematological:  Does not bruise/bleed easily.   Psychiatric/Behavioral:  Negative for depressed mood and stress. The patient is not nervous/anxious.        I have reviewed and/or updated the patient's past medical, past surgical, family, social history, problem list and allergies as appropriate.     Medications:     Current Outpatient Medications:     amLODIPine (NORVASC) 10 MG tablet, Take 1 tablet by mouth once daily, Disp: 90 tablet, Rfl: 3    ASPIRIN 81 PO, Take  by mouth., Disp: , Rfl:     fenofibrate (TRICOR) 145 MG tablet, , Disp: , Rfl:     glipizide (GLUCOTROL XL) 2.5 MG 24 hr tablet, Take 1 tablet by mouth Daily., Disp: , Rfl:     lisinopril (PRINIVIL,ZESTRIL) 40 MG tablet, Take 1 tablet by mouth once daily, Disp: 90 tablet, Rfl: 3    metFORMIN (GLUCOPHAGE) 500 MG tablet, Take  by mouth., Disp: , Rfl:     NON FORMULARY, , Disp: , Rfl:     NON FORMULARY, , Disp: , Rfl:     NON FORMULARY, , Disp: , Rfl:     tamsulosin (FLOMAX) 0.4 MG capsule 24 hr capsule, Take 1 capsule by mouth., Disp: , Rfl:     vitamin D3 125 MCG (5000 UT) capsule capsule, Take 1 capsule by mouth Daily., Disp: , Rfl:     clotrimazole-betamethasone (LOTRISONE) 1-0.05 % cream, APPLY TO CORNERS OF MOUTH DAILY FOR 10 TO 14 DAYS (Patient not taking: Reported on 12/18/2023), Disp: , Rfl:     Physical Exam:  Vital Signs:   Vitals:    12/18/23 0943   BP: 108/60   BP Location: Right arm   Patient Position: Sitting   Cuff Size: Adult   Pulse: 72   SpO2: 96%   Weight: 76.2 kg (168 lb)   Height: 167.6 cm (66\")       Physical Exam  Constitutional:       General: He is not in acute distress.     Appearance: Normal appearance. He is not diaphoretic.   HENT:      Head: Normocephalic and atraumatic.   Cardiovascular:      Rate and Rhythm: Normal rate and " regular rhythm.      Heart sounds: No murmur heard.  Pulmonary:      Effort: Pulmonary effort is normal. No respiratory distress.      Breath sounds: Normal breath sounds. No stridor. No wheezing, rhonchi or rales.   Abdominal:      General: Bowel sounds are normal. There is no distension.      Palpations: Abdomen is soft.      Tenderness: There is no abdominal tenderness. There is no guarding or rebound.   Musculoskeletal:         General: No swelling. Normal range of motion.      Cervical back: Neck supple. No tenderness.   Skin:     General: Skin is warm and dry.   Neurological:      General: No focal deficit present.      Mental Status: He is alert and oriented to person, place, and time.   Psychiatric:         Mood and Affect: Mood normal.         Behavior: Behavior normal.         Results Review:   I reviewed the patient's new clinical results.        Assessment / Plan:     1. Syncope  --Initially presented for evaluation of a syncopal episode, suspected orthostatic versus vagal in etiology  --Echocardiogram showed normal LV systolic function, no significant valvular abnormalities  -- No recurrent episodes following de-escalation of antihypertensive medications  -- Follow-up in 1 year, sooner if required     2.  Hypertension  -- Review of home BP log shows systolic BP well-controlled  --Continue amlodipine and lisinopril     3.  Dyslipidemia  --Last lipid profile available for review in 8/21 showed , HDL 29, triglycerides 162  -- Management per PCP    Follow Up:   Return in about 1 year (around 12/18/2024).      Thank you for allowing me to participate in the care of your patient. Please to not hesitate to contact me with additional questions or concerns.     FLORES Myles MD  Interventional Cardiology   12/18/2023  09:54 EST

## 2024-12-02 ENCOUNTER — TELEPHONE (OUTPATIENT)
Dept: CARDIOLOGY | Facility: CLINIC | Age: 84
End: 2024-12-02
Payer: MEDICARE

## 2024-12-02 NOTE — TELEPHONE ENCOUNTER
Caller: josé miguel cheng    Relationship to patient: Emergency Contact    Best call back number: [phone] (home)     Chief complaint: NONE    Type of visit: F/U    Requested date: ASAP - PT HAS A SURGERY SCHEDULED 1.8.24    Additional notes: NO SCHEDULING TIMEFRAME IN CHART FOR HUB TO SCHEDULE. PLEASE CALL PT TO SCHEDULE.

## 2024-12-04 ENCOUNTER — OFFICE VISIT (OUTPATIENT)
Dept: CARDIOLOGY | Facility: CLINIC | Age: 84
End: 2024-12-04
Payer: MEDICARE

## 2024-12-04 VITALS
DIASTOLIC BLOOD PRESSURE: 76 MMHG | SYSTOLIC BLOOD PRESSURE: 112 MMHG | HEART RATE: 87 BPM | HEIGHT: 67 IN | BODY MASS INDEX: 26.18 KG/M2 | OXYGEN SATURATION: 100 % | WEIGHT: 166.8 LBS

## 2024-12-04 DIAGNOSIS — Z01.810 PREOP CARDIOVASCULAR EXAM: ICD-10-CM

## 2024-12-04 DIAGNOSIS — I10 PRIMARY HYPERTENSION: ICD-10-CM

## 2024-12-04 DIAGNOSIS — R55 SYNCOPE AND COLLAPSE: Primary | ICD-10-CM

## 2024-12-04 NOTE — PROGRESS NOTES
[facility] Cardiology Office Follow Up Note    Abebe Crockett  [id number]  2024    Primary Care Provider: Dre Henry MD    Chief Complaint: Routine follow-up    History of Present Illness:   Mr. Abebe Crockett is a  84y.o. male who presents to the Cardiology Clinic for routine follow-up.  The patient has a past medical history significant for hypertension, dyslipidemia, ARIELLA, and anemia.   He has a past cardiac history significant for diastolic dysfunction, without a history of clinical CHF.  Today, the patient reports he has been doing well from a cardiac standpoint.  No significant lower extremity edema, orthopnea, or PND.  He remains active, without chest pain or exertional angina.  With de-escalation of his antihypertensives, he has not had any recurrent dizziness or lightheadedness.  No presyncopal or syncopal events.  He reports he is planning to have hernia surgery in the near future.  No other specific complaints today.     Past Cardiac Testin. Last Coronary Angio: None  2. Prior Stress Testing: None  3. Last Echo:  2019              1.  Normal left ventricular size and systolic function, LVEF 65-70%.              2.  Mild concentric LVH.              3.  Impaired LV diastolic filling consistent with grade 1 diastolic dysfunction.              4.  Normal right ventricular size and systolic function.              5.  Increased left atrial volume.              6.  Trace MR, TR, PI.              7.  Mild aortic sclerosis without stenosis.  4. Prior Holter Monitor: None    Review of Systems:   Review of Systems   Constitutional:  Negative for activity change, appetite change, chills, diaphoresis, fatigue, fever, unexpected weight gain and unexpected weight loss.   Eyes:  Negative for blurred vision and double vision.   Respiratory:  Negative for cough, chest tightness, shortness of breath and wheezing.    Cardiovascular:  Negative for chest pain, palpitations and leg  "swelling.   Gastrointestinal:  Negative for abdominal pain, anal bleeding, blood in stool and GERD.   Endocrine: Negative for cold intolerance and heat intolerance.   Genitourinary:  Negative for hematuria.   Neurological:  Negative for dizziness, syncope, weakness and light-headedness.   Hematological:  Does not bruise/bleed easily.   Psychiatric/Behavioral:  Negative for depressed mood and stress. The patient is not nervous/anxious.        I have reviewed and/or updated the patient's past medical, past surgical, family, social history, problem list and allergies as appropriate.     Medications:     Current Outpatient Medications:     amLODIPine (NORVASC) 10 MG tablet, Take 1 tablet by mouth once daily, Disp: 90 tablet, Rfl: 3    ASPIRIN 81 PO, Take  by mouth., Disp: , Rfl:     fenofibrate (TRICOR) 145 MG tablet, , Disp: , Rfl:     glipizide (GLUCOTROL XL) 2.5 MG 24 hr tablet, Take 1 tablet by mouth Daily., Disp: , Rfl:     lisinopril (PRINIVIL,ZESTRIL) 40 MG tablet, Take 1 tablet by mouth once daily, Disp: 90 tablet, Rfl: 3    metFORMIN (GLUCOPHAGE) 500 MG tablet, Take  by mouth., Disp: , Rfl:     NON FORMULARY, , Disp: , Rfl:     NON FORMULARY, , Disp: , Rfl:     NON FORMULARY, , Disp: , Rfl:     tamsulosin (FLOMAX) 0.4 MG capsule 24 hr capsule, Take 1 capsule by mouth., Disp: , Rfl:     vitamin D3 125 MCG (5000 UT) capsule capsule, Take 1 capsule by mouth Daily., Disp: , Rfl:     clotrimazole-betamethasone (LOTRISONE) 1-0.05 % cream, APPLY TO CORNERS OF MOUTH DAILY FOR 10 TO 14 DAYS (Patient not taking: Reported on 12/18/2023), Disp: , Rfl:     Physical Exam:  Vital Signs:   Vitals:    12/04/24 1301   BP: 112/76   BP Location: Left arm   Patient Position: Sitting   Cuff Size: Adult   Pulse: 87   SpO2: 100%   Weight: 75.7 kg (166 lb 12.8 oz)   Height: 170.2 cm (67\")       Physical Exam  Constitutional:       General: He is not in acute distress.     Appearance: Normal appearance. He is not diaphoretic.   HENT:    "   Head: Normocephalic and atraumatic.   Cardiovascular:      Rate and Rhythm: Normal rate and regular rhythm.      Heart sounds: No murmur heard.  Pulmonary:      Effort: Pulmonary effort is normal. No respiratory distress.      Breath sounds: Normal breath sounds. No stridor. No wheezing, rhonchi or rales.   Abdominal:      General: Bowel sounds are normal. There is no distension.      Palpations: Abdomen is soft.      Tenderness: There is no abdominal tenderness. There is no guarding or rebound.   Musculoskeletal:         General: No swelling. Normal range of motion.      Cervical back: Neck supple. No tenderness.   Skin:     General: Skin is warm and dry.   Neurological:      General: No focal deficit present.      Mental Status: He is alert and oriented to person, place, and time.   Psychiatric:         Mood and Affect: Mood normal.         Behavior: Behavior normal.         Results Review:   I reviewed the patient's new clinical results.        Assessment / Plan:     1. Syncope  -- No recurrent presyncopal or syncopal events  --Prior echocardiogram without significant structural or valvular abnormalities  --No further workup required     2.  Hypertension  -- BP currently well-controlled     3.  Dyslipidemia  --Last lipid profile available for review in 8/21 showed , HDL 29, triglycerides 162  -- Management per PCP    4.  Preoperative cardiac risk assessment  --Presents today for preoperative cardiac risk assessment in consideration of undergoing hernia surgery  --No significant prior cardiac history other than history of suspected orthostatic syncope  -- ECG without significant conduction system abnormalities  --No chest pain or exertional anginal symptoms  --No evidence of decompensated CHF, valvulopathy, or arrhythmia  --The patient is currently at low risk for adverse perioperative cardiac events with a low risk revised cardiac risk index of 0.4%.  Given functional status is > 4 METS without anginal  symptoms it is reasonable to proceed with the proposed procedure without further cardiac testing or interventions      Follow Up:   Return in about 1 year (around 12/4/2025).      Thank you for allowing me to participate in the care of your patient. Please to not hesitate to contact me with additional questions or concerns.     FLORES Myles MD  Interventional Cardiology   12/04/2024  12:58 EST

## 2024-12-30 ENCOUNTER — PRE-ADMISSION TESTING (OUTPATIENT)
Dept: PREADMISSION TESTING | Facility: HOSPITAL | Age: 84
End: 2024-12-30
Payer: MEDICARE

## 2024-12-30 VITALS — HEIGHT: 65 IN | BODY MASS INDEX: 28.19 KG/M2 | WEIGHT: 169.2 LBS

## 2024-12-30 LAB
ANION GAP SERPL CALCULATED.3IONS-SCNC: 10 MMOL/L (ref 5–15)
BUN SERPL-MCNC: 20 MG/DL (ref 8–23)
BUN/CREAT SERPL: 15.3 (ref 7–25)
CALCIUM SPEC-SCNC: 9.6 MG/DL (ref 8.6–10.5)
CHLORIDE SERPL-SCNC: 100 MMOL/L (ref 98–107)
CO2 SERPL-SCNC: 26 MMOL/L (ref 22–29)
CREAT SERPL-MCNC: 1.31 MG/DL (ref 0.76–1.27)
DEPRECATED RDW RBC AUTO: 43.6 FL (ref 37–54)
EGFRCR SERPLBLD CKD-EPI 2021: 54.3 ML/MIN/1.73
ERYTHROCYTE [DISTWIDTH] IN BLOOD BY AUTOMATED COUNT: 13.2 % (ref 12.3–15.4)
GLUCOSE SERPL-MCNC: 87 MG/DL (ref 65–99)
HCT VFR BLD AUTO: 35.1 % (ref 37.5–51)
HGB BLD-MCNC: 12 G/DL (ref 13–17.7)
MCH RBC QN AUTO: 30.8 PG (ref 26.6–33)
MCHC RBC AUTO-ENTMCNC: 34.2 G/DL (ref 31.5–35.7)
MCV RBC AUTO: 90.2 FL (ref 79–97)
PLATELET # BLD AUTO: 334 10*3/MM3 (ref 140–450)
PMV BLD AUTO: 9.5 FL (ref 6–12)
POTASSIUM SERPL-SCNC: 5 MMOL/L (ref 3.5–5.2)
QT INTERVAL: 394 MS
QTC INTERVAL: 439 MS
RBC # BLD AUTO: 3.89 10*6/MM3 (ref 4.14–5.8)
SODIUM SERPL-SCNC: 136 MMOL/L (ref 136–145)
WBC NRBC COR # BLD AUTO: 7.31 10*3/MM3 (ref 3.4–10.8)

## 2024-12-30 PROCEDURE — 93005 ELECTROCARDIOGRAM TRACING: CPT

## 2024-12-30 PROCEDURE — 85027 COMPLETE CBC AUTOMATED: CPT

## 2024-12-30 PROCEDURE — 36415 COLL VENOUS BLD VENIPUNCTURE: CPT

## 2024-12-30 PROCEDURE — 80048 BASIC METABOLIC PNL TOTAL CA: CPT

## 2024-12-30 NOTE — PAT
An arrival time for procedure was not provided during PAT visit. If patient had any questions or concerns about their arrival time, they were instructed to contact their surgeon/physician.  Additionally, if the patient referred to an arrival time that was acquired from their my chart account, patient was encouraged to verify that time with their surgeon/physician. Arrival times are NOT provided in Pre Admission Testing Department.    Patient to apply Chlorhexadine wipes  to surgical area (as instructed) the night before procedure and the AM of procedure. Wipes provided.    Patient denies any current skin issues.     Patient viewed general PAT education video as instructed in their preoperative information received from their surgeon.  Patient stated the general PAT education video was viewed in its entirety and survey completed.  Copies of PAT general education handouts (Incentive Spirometry, Meds to Beds Program, Patient Belongings, Pre-op skin preparation instructions, Blood Glucose testing, Visitor policy, Surgery FAQ, Code H) distributed to patient if not printed. Education related to the PAT pass and skin preparation for surgery (if applicable) completed in PAT as a reinforcement to PAT education video. Patient instructed to return PAT pass provided today as well as completed skin preparation sheet (if applicable) on the day of procedure.     Additionally if patient had not viewed video yet but intended to view it at home or in our waiting area, then referred them to the handout with QR code/link provided during PAT visit.  Encouraged patient/family to read PAT general education handouts thoroughly and notify PAT staff with any questions or concerns. Patient verbalized understanding of all information and priority content.    PATIENT EKG ON CHART AND IN EPIC FROM 12/30/2024    PATIENT CARDIAC CLEARANCE ON CHART

## 2025-02-03 ENCOUNTER — LAB (OUTPATIENT)
Dept: LAB | Facility: HOSPITAL | Age: 85
End: 2025-02-03
Payer: MEDICARE

## 2025-02-03 ENCOUNTER — TRANSCRIBE ORDERS (OUTPATIENT)
Dept: LAB | Facility: HOSPITAL | Age: 85
End: 2025-02-03
Payer: MEDICARE

## 2025-02-03 DIAGNOSIS — K40.90 BUBONOCELE: ICD-10-CM

## 2025-02-03 DIAGNOSIS — K40.90 BUBONOCELE: Primary | ICD-10-CM

## 2025-02-03 LAB
ANION GAP SERPL CALCULATED.3IONS-SCNC: 9.6 MMOL/L (ref 5–15)
BASOPHILS # BLD AUTO: 0.05 10*3/MM3 (ref 0–0.2)
BASOPHILS NFR BLD AUTO: 0.7 % (ref 0–1.5)
BUN SERPL-MCNC: 20 MG/DL (ref 8–23)
BUN/CREAT SERPL: 14.2 (ref 7–25)
CALCIUM SPEC-SCNC: 9.5 MG/DL (ref 8.6–10.5)
CHLORIDE SERPL-SCNC: 96 MMOL/L (ref 98–107)
CO2 SERPL-SCNC: 26.4 MMOL/L (ref 22–29)
CREAT SERPL-MCNC: 1.41 MG/DL (ref 0.76–1.27)
DEPRECATED RDW RBC AUTO: 39.8 FL (ref 37–54)
EGFRCR SERPLBLD CKD-EPI 2021: 49.8 ML/MIN/1.73
EOSINOPHIL # BLD AUTO: 0.09 10*3/MM3 (ref 0–0.4)
EOSINOPHIL NFR BLD AUTO: 1.3 % (ref 0.3–6.2)
ERYTHROCYTE [DISTWIDTH] IN BLOOD BY AUTOMATED COUNT: 12.1 % (ref 12.3–15.4)
GLUCOSE SERPL-MCNC: 131 MG/DL (ref 65–99)
HCT VFR BLD AUTO: 37.9 % (ref 37.5–51)
HGB BLD-MCNC: 12.8 G/DL (ref 13–17.7)
IMM GRANULOCYTES # BLD AUTO: 0.02 10*3/MM3 (ref 0–0.05)
IMM GRANULOCYTES NFR BLD AUTO: 0.3 % (ref 0–0.5)
LYMPHOCYTES # BLD AUTO: 0.89 10*3/MM3 (ref 0.7–3.1)
LYMPHOCYTES NFR BLD AUTO: 12.4 % (ref 19.6–45.3)
MCH RBC QN AUTO: 30.7 PG (ref 26.6–33)
MCHC RBC AUTO-ENTMCNC: 33.8 G/DL (ref 31.5–35.7)
MCV RBC AUTO: 90.9 FL (ref 79–97)
MONOCYTES # BLD AUTO: 0.55 10*3/MM3 (ref 0.1–0.9)
MONOCYTES NFR BLD AUTO: 7.7 % (ref 5–12)
NEUTROPHILS NFR BLD AUTO: 5.55 10*3/MM3 (ref 1.7–7)
NEUTROPHILS NFR BLD AUTO: 77.6 % (ref 42.7–76)
NRBC BLD AUTO-RTO: 0 /100 WBC (ref 0–0.2)
PLATELET # BLD AUTO: 325 10*3/MM3 (ref 140–450)
PMV BLD AUTO: 10.4 FL (ref 6–12)
POTASSIUM SERPL-SCNC: 5.1 MMOL/L (ref 3.5–5.2)
RBC # BLD AUTO: 4.17 10*6/MM3 (ref 4.14–5.8)
SODIUM SERPL-SCNC: 132 MMOL/L (ref 136–145)
WBC NRBC COR # BLD AUTO: 7.15 10*3/MM3 (ref 3.4–10.8)

## 2025-02-03 PROCEDURE — 36415 COLL VENOUS BLD VENIPUNCTURE: CPT

## 2025-02-03 PROCEDURE — 80048 BASIC METABOLIC PNL TOTAL CA: CPT

## 2025-02-03 PROCEDURE — 85025 COMPLETE CBC W/AUTO DIFF WBC: CPT

## 2025-02-04 ENCOUNTER — APPOINTMENT (OUTPATIENT)
Dept: GENERAL RADIOLOGY | Facility: HOSPITAL | Age: 85
DRG: 291 | End: 2025-02-04
Payer: MEDICARE

## 2025-02-04 ENCOUNTER — HOSPITAL ENCOUNTER (INPATIENT)
Facility: HOSPITAL | Age: 85
LOS: 2 days | Discharge: HOME | DRG: 291 | End: 2025-02-06
Attending: STUDENT IN AN ORGANIZED HEALTH CARE EDUCATION/TRAINING PROGRAM | Admitting: INTERNAL MEDICINE
Payer: MEDICARE

## 2025-02-04 ENCOUNTER — APPOINTMENT (OUTPATIENT)
Dept: CT IMAGING | Facility: HOSPITAL | Age: 85
DRG: 291 | End: 2025-02-04
Payer: MEDICARE

## 2025-02-04 DIAGNOSIS — J90 BILATERAL PLEURAL EFFUSION: ICD-10-CM

## 2025-02-04 DIAGNOSIS — J10.1 INFLUENZA A: ICD-10-CM

## 2025-02-04 DIAGNOSIS — J96.01 ACUTE RESPIRATORY FAILURE WITH HYPOXIA AND HYPERCAPNIA: Primary | ICD-10-CM

## 2025-02-04 DIAGNOSIS — I50.21 ACUTE SYSTOLIC HEART FAILURE: ICD-10-CM

## 2025-02-04 DIAGNOSIS — I50.9 ACUTE CONGESTIVE HEART FAILURE, UNSPECIFIED HEART FAILURE TYPE: ICD-10-CM

## 2025-02-04 DIAGNOSIS — J96.02 ACUTE RESPIRATORY FAILURE WITH HYPOXIA AND HYPERCAPNIA: Primary | ICD-10-CM

## 2025-02-04 DIAGNOSIS — I16.1 HYPERTENSIVE EMERGENCY: ICD-10-CM

## 2025-02-04 LAB
ALBUMIN SERPL-MCNC: 4.2 G/DL (ref 3.5–5.2)
ALBUMIN/GLOB SERPL: 1.4 G/DL
ALP SERPL-CCNC: 48 U/L (ref 39–117)
ALT SERPL W P-5'-P-CCNC: 20 U/L (ref 1–41)
ANION GAP SERPL CALCULATED.3IONS-SCNC: 9.5 MMOL/L (ref 5–15)
AST SERPL-CCNC: 34 U/L (ref 1–40)
ATMOSPHERIC PRESS: 740 MMHG
BASE EXCESS BLDV CALC-SCNC: -2.9 MMOL/L (ref 0–2)
BASOPHILS # BLD AUTO: 0.06 10*3/MM3 (ref 0–0.2)
BASOPHILS NFR BLD AUTO: 0.5 % (ref 0–1.5)
BDY SITE: ABNORMAL
BILIRUB SERPL-MCNC: 0.4 MG/DL (ref 0–1.2)
BUN SERPL-MCNC: 15 MG/DL (ref 8–23)
BUN/CREAT SERPL: 11 (ref 7–25)
CALCIUM SPEC-SCNC: 9.6 MG/DL (ref 8.6–10.5)
CHLORIDE SERPL-SCNC: 97 MMOL/L (ref 98–107)
CO2 SERPL-SCNC: 25.5 MMOL/L (ref 22–29)
COHGB MFR BLD: 1 % (ref 0–5)
CREAT SERPL-MCNC: 1.36 MG/DL (ref 0.76–1.27)
DEPRECATED RDW RBC AUTO: 43.8 FL (ref 37–54)
EGFRCR SERPLBLD CKD-EPI 2021: 52 ML/MIN/1.73
EOSINOPHIL # BLD AUTO: 0.07 10*3/MM3 (ref 0–0.4)
EOSINOPHIL NFR BLD AUTO: 0.5 % (ref 0.3–6.2)
ERYTHROCYTE [DISTWIDTH] IN BLOOD BY AUTOMATED COUNT: 13.2 % (ref 12.3–15.4)
FLUAV RNA RESP QL NAA+PROBE: DETECTED
FLUBV RNA RESP QL NAA+PROBE: NOT DETECTED
GAS FLOW AIRWAY: 6 LPM
GLOBULIN UR ELPH-MCNC: 2.9 GM/DL
GLUCOSE SERPL-MCNC: 154 MG/DL (ref 65–99)
HCO3 BLDV-SCNC: 26 MMOL/L (ref 22–28)
HCT VFR BLD AUTO: 40.8 % (ref 37.5–51)
HGB BLD-MCNC: 14.3 G/DL (ref 13–17.7)
IMM GRANULOCYTES # BLD AUTO: 0.05 10*3/MM3 (ref 0–0.05)
IMM GRANULOCYTES NFR BLD AUTO: 0.4 % (ref 0–0.5)
LYMPHOCYTES # BLD AUTO: 1.06 10*3/MM3 (ref 0.7–3.1)
LYMPHOCYTES NFR BLD AUTO: 8.2 % (ref 19.6–45.3)
Lab: ABNORMAL
MCH RBC QN AUTO: 31.4 PG (ref 26.6–33)
MCHC RBC AUTO-ENTMCNC: 35 G/DL (ref 31.5–35.7)
MCV RBC AUTO: 89.7 FL (ref 79–97)
METHGB BLD QL: 0.5 % (ref 0–3)
MODALITY: ABNORMAL
MONOCYTES # BLD AUTO: 1.16 10*3/MM3 (ref 0.1–0.9)
MONOCYTES NFR BLD AUTO: 9 % (ref 5–12)
NEUTROPHILS NFR BLD AUTO: 10.48 10*3/MM3 (ref 1.7–7)
NEUTROPHILS NFR BLD AUTO: 81.4 % (ref 42.7–76)
NOTIFIED BY: ABNORMAL
NOTIFIED WHO: ABNORMAL
NRBC BLD AUTO-RTO: 0 /100 WBC (ref 0–0.2)
NT-PROBNP SERPL-MCNC: ABNORMAL PG/ML (ref 0–1800)
OXYHGB MFR BLDV: 67.5 % (ref 40–70)
PCO2 BLDV: 62.7 MM HG (ref 40–50)
PH BLDV: 7.23 PH UNITS (ref 7.32–7.42)
PLATELET # BLD AUTO: 337 10*3/MM3 (ref 140–450)
PMV BLD AUTO: 9.3 FL (ref 6–12)
PO2 BLDV: 43.5 MM HG (ref 30–50)
POTASSIUM SERPL-SCNC: 4.5 MMOL/L (ref 3.5–5.2)
PROT SERPL-MCNC: 7.1 G/DL (ref 6–8.5)
RBC # BLD AUTO: 4.55 10*6/MM3 (ref 4.14–5.8)
SAO2 % BLDCOV: 68.6 % (ref 45–75)
SARS-COV-2 RNA RESP QL NAA+PROBE: NOT DETECTED
SODIUM SERPL-SCNC: 132 MMOL/L (ref 136–145)
TROPONIN T SERPL HS-MCNC: 57 NG/L
VENTILATOR MODE: ABNORMAL
WBC NRBC COR # BLD AUTO: 12.88 10*3/MM3 (ref 3.4–10.8)

## 2025-02-04 PROCEDURE — 99223 1ST HOSP IP/OBS HIGH 75: CPT | Performed by: INTERNAL MEDICINE

## 2025-02-04 PROCEDURE — 83880 ASSAY OF NATRIURETIC PEPTIDE: CPT | Performed by: STUDENT IN AN ORGANIZED HEALTH CARE EDUCATION/TRAINING PROGRAM

## 2025-02-04 PROCEDURE — 99285 EMERGENCY DEPT VISIT HI MDM: CPT

## 2025-02-04 PROCEDURE — 87636 SARSCOV2 & INF A&B AMP PRB: CPT | Performed by: STUDENT IN AN ORGANIZED HEALTH CARE EDUCATION/TRAINING PROGRAM

## 2025-02-04 PROCEDURE — 82805 BLOOD GASES W/O2 SATURATION: CPT

## 2025-02-04 PROCEDURE — 80053 COMPREHEN METABOLIC PANEL: CPT | Performed by: STUDENT IN AN ORGANIZED HEALTH CARE EDUCATION/TRAINING PROGRAM

## 2025-02-04 PROCEDURE — 71045 X-RAY EXAM CHEST 1 VIEW: CPT

## 2025-02-04 PROCEDURE — 25510000001 IOPAMIDOL 61 % SOLUTION: Performed by: STUDENT IN AN ORGANIZED HEALTH CARE EDUCATION/TRAINING PROGRAM

## 2025-02-04 PROCEDURE — 71275 CT ANGIOGRAPHY CHEST: CPT

## 2025-02-04 PROCEDURE — 25010000002 NITROGLYCERIN 200 MCG/ML SOLUTION: Performed by: STUDENT IN AN ORGANIZED HEALTH CARE EDUCATION/TRAINING PROGRAM

## 2025-02-04 PROCEDURE — 99291 CRITICAL CARE FIRST HOUR: CPT | Performed by: STUDENT IN AN ORGANIZED HEALTH CARE EDUCATION/TRAINING PROGRAM

## 2025-02-04 PROCEDURE — 94660 CPAP INITIATION&MGMT: CPT

## 2025-02-04 PROCEDURE — 94799 UNLISTED PULMONARY SVC/PX: CPT

## 2025-02-04 PROCEDURE — 25010000002 FUROSEMIDE PER 20 MG

## 2025-02-04 PROCEDURE — 93005 ELECTROCARDIOGRAM TRACING: CPT | Performed by: STUDENT IN AN ORGANIZED HEALTH CARE EDUCATION/TRAINING PROGRAM

## 2025-02-04 PROCEDURE — 82820 HEMOGLOBIN-OXYGEN AFFINITY: CPT

## 2025-02-04 PROCEDURE — 84484 ASSAY OF TROPONIN QUANT: CPT | Performed by: STUDENT IN AN ORGANIZED HEALTH CARE EDUCATION/TRAINING PROGRAM

## 2025-02-04 PROCEDURE — 85025 COMPLETE CBC W/AUTO DIFF WBC: CPT | Performed by: STUDENT IN AN ORGANIZED HEALTH CARE EDUCATION/TRAINING PROGRAM

## 2025-02-04 PROCEDURE — 87040 BLOOD CULTURE FOR BACTERIA: CPT | Performed by: STUDENT IN AN ORGANIZED HEALTH CARE EDUCATION/TRAINING PROGRAM

## 2025-02-04 PROCEDURE — 83036 HEMOGLOBIN GLYCOSYLATED A1C: CPT | Performed by: INTERNAL MEDICINE

## 2025-02-04 RX ORDER — IOPAMIDOL 612 MG/ML
100 INJECTION, SOLUTION INTRAVASCULAR
Status: COMPLETED | OUTPATIENT
Start: 2025-02-04 | End: 2025-02-04

## 2025-02-04 RX ORDER — NITROGLYCERIN 0.4 MG/1
TABLET SUBLINGUAL
Status: COMPLETED
Start: 2025-02-04 | End: 2025-02-04

## 2025-02-04 RX ORDER — NITROGLYCERIN 20 MG/100ML
5-200 INJECTION INTRAVENOUS
Status: DISCONTINUED | OUTPATIENT
Start: 2025-02-04 | End: 2025-02-06 | Stop reason: HOSPADM

## 2025-02-04 RX ORDER — OSELTAMIVIR PHOSPHATE 75 MG/1
75 CAPSULE ORAL ONCE
Status: COMPLETED | OUTPATIENT
Start: 2025-02-04 | End: 2025-02-05

## 2025-02-04 RX ORDER — FUROSEMIDE 10 MG/ML
80 INJECTION INTRAMUSCULAR; INTRAVENOUS ONCE
Status: COMPLETED | OUTPATIENT
Start: 2025-02-04 | End: 2025-02-04

## 2025-02-04 RX ORDER — SODIUM CHLORIDE 0.9 % (FLUSH) 0.9 %
10 SYRINGE (ML) INJECTION AS NEEDED
Status: DISCONTINUED | OUTPATIENT
Start: 2025-02-04 | End: 2025-02-06 | Stop reason: HOSPADM

## 2025-02-04 RX ORDER — FUROSEMIDE 10 MG/ML
INJECTION INTRAMUSCULAR; INTRAVENOUS
Status: COMPLETED
Start: 2025-02-04 | End: 2025-02-04

## 2025-02-04 RX ORDER — OSELTAMIVIR PHOSPHATE 30 MG/1
30 CAPSULE ORAL EVERY 12 HOURS SCHEDULED
Status: DISCONTINUED | OUTPATIENT
Start: 2025-02-05 | End: 2025-02-06 | Stop reason: HOSPADM

## 2025-02-04 RX ORDER — NITROGLYCERIN 0.4 MG/1
0.4 TABLET SUBLINGUAL
Status: DISCONTINUED | OUTPATIENT
Start: 2025-02-04 | End: 2025-02-06 | Stop reason: HOSPADM

## 2025-02-04 RX ADMIN — NITROGLYCERIN 0.4 MG: 0.4 TABLET SUBLINGUAL at 21:51

## 2025-02-04 RX ADMIN — IOPAMIDOL 100 ML: 612 INJECTION, SOLUTION INTRAVENOUS at 22:29

## 2025-02-04 RX ADMIN — FUROSEMIDE 80 MG: 10 INJECTION, SOLUTION INTRAMUSCULAR; INTRAVENOUS at 21:50

## 2025-02-04 RX ADMIN — NITROGLYCERIN 5 MCG/MIN: 20 INJECTION INTRAVENOUS at 22:34

## 2025-02-04 RX ADMIN — FUROSEMIDE 80 MG: 10 INJECTION INTRAMUSCULAR; INTRAVENOUS at 21:50

## 2025-02-05 ENCOUNTER — APPOINTMENT (OUTPATIENT)
Dept: CARDIOLOGY | Facility: HOSPITAL | Age: 85
DRG: 291 | End: 2025-02-05
Payer: MEDICARE

## 2025-02-05 PROBLEM — J96.01 ACUTE RESPIRATORY FAILURE WITH HYPOXIA: Status: ACTIVE | Noted: 2025-02-05

## 2025-02-05 PROBLEM — J81.0 ACUTE PULMONARY EDEMA: Status: ACTIVE | Noted: 2025-02-05

## 2025-02-05 PROBLEM — J10.1 INFLUENZA A: Status: ACTIVE | Noted: 2025-02-05

## 2025-02-05 LAB
ANION GAP SERPL CALCULATED.3IONS-SCNC: 10.1 MMOL/L (ref 5–15)
AV MEAN PRESS GRAD SYS DOP V1V2: 5 MMHG
AV VMAX SYS DOP: 150 CM/SEC
BACTERIA UR QL AUTO: ABNORMAL /HPF
BH CV ECHO MEAS - AO MAX PG: 9 MMHG
BH CV ECHO MEAS - AO ROOT DIAM: 3.3 CM
BH CV ECHO MEAS - AO V2 VTI: 25 CM
BH CV ECHO MEAS - AVA(I,D): 1.55 CM2
BH CV ECHO MEAS - EDV(CUBED): 172.8 ML
BH CV ECHO MEAS - EDV(MOD-SP2): 162 ML
BH CV ECHO MEAS - EDV(MOD-SP4): 129 ML
BH CV ECHO MEAS - EF(MOD-SP2): 46.4 %
BH CV ECHO MEAS - EF(MOD-SP4): 33.6 %
BH CV ECHO MEAS - ESV(CUBED): 117.6 ML
BH CV ECHO MEAS - ESV(MOD-SP2): 86.8 ML
BH CV ECHO MEAS - ESV(MOD-SP4): 85.7 ML
BH CV ECHO MEAS - FS: 12 %
BH CV ECHO MEAS - IVS/LVPW: 0.87 CM
BH CV ECHO MEAS - IVSD: 0.94 CM
BH CV ECHO MEAS - LA DIMENSION: 4.4 CM
BH CV ECHO MEAS - LAT PEAK E' VEL: 4.5 CM/SEC
BH CV ECHO MEAS - LV DIASTOLIC VOL/BSA (35-75): 70.2 CM2
BH CV ECHO MEAS - LV MASS(C)D: 220.6 GRAMS
BH CV ECHO MEAS - LV MAX PG: 2.15 MMHG
BH CV ECHO MEAS - LV MEAN PG: 1 MMHG
BH CV ECHO MEAS - LV SYSTOLIC VOL/BSA (12-30): 46.7 CM2
BH CV ECHO MEAS - LV V1 MAX: 73.3 CM/SEC
BH CV ECHO MEAS - LV V1 VTI: 12.2 CM
BH CV ECHO MEAS - LVIDD: 5.6 CM
BH CV ECHO MEAS - LVIDS: 4.9 CM
BH CV ECHO MEAS - LVOT AREA: 3.2 CM2
BH CV ECHO MEAS - LVOT DIAM: 2.01 CM
BH CV ECHO MEAS - LVPWD: 1.08 CM
BH CV ECHO MEAS - MED PEAK E' VEL: 5.8 CM/SEC
BH CV ECHO MEAS - MV A MAX VEL: 82.7 CM/SEC
BH CV ECHO MEAS - MV DEC SLOPE: 307 CM/SEC2
BH CV ECHO MEAS - MV DEC TIME: 0.19 SEC
BH CV ECHO MEAS - MV E MAX VEL: 56.9 CM/SEC
BH CV ECHO MEAS - MV E/A: 0.69
BH CV ECHO MEAS - MV MAX PG: 3.2 MMHG
BH CV ECHO MEAS - MV MEAN PG: 2 MMHG
BH CV ECHO MEAS - MV V2 VTI: 18.4 CM
BH CV ECHO MEAS - MVA(VTI): 2.1 CM2
BH CV ECHO MEAS - PA ACC TIME: 0.12 SEC
BH CV ECHO MEAS - PA V2 MAX: 94.2 CM/SEC
BH CV ECHO MEAS - RAP SYSTOLE: 3 MMHG
BH CV ECHO MEAS - RV MAX PG: 2.8 MMHG
BH CV ECHO MEAS - RV V1 MAX: 83.1 CM/SEC
BH CV ECHO MEAS - RV V1 VTI: 13.5 CM
BH CV ECHO MEAS - RVSP: 31.3 MMHG
BH CV ECHO MEAS - SV(LVOT): 38.7 ML
BH CV ECHO MEAS - SV(MOD-SP2): 75.2 ML
BH CV ECHO MEAS - SV(MOD-SP4): 43.3 ML
BH CV ECHO MEAS - SVI(LVOT): 21.1 ML/M2
BH CV ECHO MEAS - SVI(MOD-SP2): 40.9 ML/M2
BH CV ECHO MEAS - SVI(MOD-SP4): 23.6 ML/M2
BH CV ECHO MEAS - TAPSE (>1.6): 2.29 CM
BH CV ECHO MEAS - TR MAX PG: 28.3 MMHG
BH CV ECHO MEAS - TR MAX VEL: 266 CM/SEC
BH CV ECHO MEASUREMENTS AVERAGE E/E' RATIO: 11.05
BH CV XLRA - TDI S': 8.6 CM/SEC
BILIRUB UR QL STRIP: NEGATIVE
BUN SERPL-MCNC: 21 MG/DL (ref 8–23)
BUN/CREAT SERPL: 15.6 (ref 7–25)
CALCIUM SPEC-SCNC: 8.5 MG/DL (ref 8.6–10.5)
CHLORIDE SERPL-SCNC: 96 MMOL/L (ref 98–107)
CHOLEST SERPL-MCNC: 174 MG/DL (ref 0–200)
CLARITY UR: CLEAR
CO2 SERPL-SCNC: 24.9 MMOL/L (ref 22–29)
COLOR UR: YELLOW
CREAT SERPL-MCNC: 1.35 MG/DL (ref 0.76–1.27)
D-LACTATE SERPL-SCNC: 1.3 MMOL/L (ref 0.5–2)
DEPRECATED RDW RBC AUTO: 42.7 FL (ref 37–54)
EGFRCR SERPLBLD CKD-EPI 2021: 52.4 ML/MIN/1.73
ERYTHROCYTE [DISTWIDTH] IN BLOOD BY AUTOMATED COUNT: 13.2 % (ref 12.3–15.4)
GEN 5 1HR TROPONIN T REFLEX: 188 NG/L
GLUCOSE BLDC GLUCOMTR-MCNC: 113 MG/DL (ref 70–130)
GLUCOSE BLDC GLUCOMTR-MCNC: 116 MG/DL (ref 70–130)
GLUCOSE BLDC GLUCOMTR-MCNC: 120 MG/DL (ref 70–130)
GLUCOSE BLDC GLUCOMTR-MCNC: 120 MG/DL (ref 70–130)
GLUCOSE SERPL-MCNC: 132 MG/DL (ref 65–99)
GLUCOSE UR STRIP-MCNC: NEGATIVE MG/DL
HBA1C MFR BLD: 5.8 % (ref 4.8–5.6)
HCT VFR BLD AUTO: 34.3 % (ref 37.5–51)
HDLC SERPL-MCNC: 30 MG/DL (ref 40–60)
HGB BLD-MCNC: 12 G/DL (ref 13–17.7)
HGB UR QL STRIP.AUTO: ABNORMAL
HYALINE CASTS UR QL AUTO: ABNORMAL /LPF
KETONES UR QL STRIP: NEGATIVE
LDLC SERPL CALC-MCNC: 127 MG/DL (ref 0–100)
LDLC/HDLC SERPL: 4.21 {RATIO}
LEFT ATRIUM VOLUME INDEX: 42.7 ML/M2
LEUKOCYTE ESTERASE UR QL STRIP.AUTO: NEGATIVE
LV EF BIPLANE MOD: 40 %
MCH RBC QN AUTO: 30.8 PG (ref 26.6–33)
MCHC RBC AUTO-ENTMCNC: 35 G/DL (ref 31.5–35.7)
MCV RBC AUTO: 87.9 FL (ref 79–97)
NITRITE UR QL STRIP: NEGATIVE
PH UR STRIP.AUTO: <=5 [PH] (ref 5–8)
PLATELET # BLD AUTO: 262 10*3/MM3 (ref 140–450)
PMV BLD AUTO: 9.6 FL (ref 6–12)
POTASSIUM SERPL-SCNC: 4.1 MMOL/L (ref 3.5–5.2)
PROCALCITONIN SERPL-MCNC: 0.17 NG/ML (ref 0–0.25)
PROT UR QL STRIP: ABNORMAL
RBC # BLD AUTO: 3.9 10*6/MM3 (ref 4.14–5.8)
RBC # UR STRIP: ABNORMAL /HPF
REF LAB TEST METHOD: ABNORMAL
SODIUM SERPL-SCNC: 131 MMOL/L (ref 136–145)
SP GR UR STRIP: 1.01 (ref 1–1.03)
SQUAMOUS #/AREA URNS HPF: ABNORMAL /HPF
TRIGL SERPL-MCNC: 89 MG/DL (ref 0–150)
TROPONIN T % DELTA: 230
TROPONIN T NUMERIC DELTA: 131 NG/L
TSH SERPL DL<=0.05 MIU/L-ACNC: 1.26 UIU/ML (ref 0.27–4.2)
UROBILINOGEN UR QL STRIP: ABNORMAL
VLDLC SERPL-MCNC: 17 MG/DL (ref 5–40)
WBC # UR STRIP: ABNORMAL /HPF
WBC NRBC COR # BLD AUTO: 9.98 10*3/MM3 (ref 3.4–10.8)

## 2025-02-05 PROCEDURE — 84443 ASSAY THYROID STIM HORMONE: CPT | Performed by: INTERNAL MEDICINE

## 2025-02-05 PROCEDURE — 99222 1ST HOSP IP/OBS MODERATE 55: CPT | Performed by: INTERNAL MEDICINE

## 2025-02-05 PROCEDURE — 81001 URINALYSIS AUTO W/SCOPE: CPT | Performed by: STUDENT IN AN ORGANIZED HEALTH CARE EDUCATION/TRAINING PROGRAM

## 2025-02-05 PROCEDURE — 84145 PROCALCITONIN (PCT): CPT | Performed by: INTERNAL MEDICINE

## 2025-02-05 PROCEDURE — 93306 TTE W/DOPPLER COMPLETE: CPT | Performed by: INTERNAL MEDICINE

## 2025-02-05 PROCEDURE — 80048 BASIC METABOLIC PNL TOTAL CA: CPT | Performed by: INTERNAL MEDICINE

## 2025-02-05 PROCEDURE — 80061 LIPID PANEL: CPT | Performed by: INTERNAL MEDICINE

## 2025-02-05 PROCEDURE — 94660 CPAP INITIATION&MGMT: CPT

## 2025-02-05 PROCEDURE — 93306 TTE W/DOPPLER COMPLETE: CPT

## 2025-02-05 PROCEDURE — 25010000002 SULFUR HEXAFLUORIDE MICROSPH 60.7-25 MG RECONSTITUTED SUSPENSION: Performed by: INTERNAL MEDICINE

## 2025-02-05 PROCEDURE — 82948 REAGENT STRIP/BLOOD GLUCOSE: CPT | Performed by: INTERNAL MEDICINE

## 2025-02-05 PROCEDURE — 25010000002 ENOXAPARIN PER 10 MG: Performed by: INTERNAL MEDICINE

## 2025-02-05 PROCEDURE — 99232 SBSQ HOSP IP/OBS MODERATE 35: CPT | Performed by: INTERNAL MEDICINE

## 2025-02-05 PROCEDURE — 94799 UNLISTED PULMONARY SVC/PX: CPT

## 2025-02-05 PROCEDURE — 82948 REAGENT STRIP/BLOOD GLUCOSE: CPT

## 2025-02-05 PROCEDURE — 83605 ASSAY OF LACTIC ACID: CPT | Performed by: STUDENT IN AN ORGANIZED HEALTH CARE EDUCATION/TRAINING PROGRAM

## 2025-02-05 PROCEDURE — 25010000002 FUROSEMIDE PER 20 MG: Performed by: INTERNAL MEDICINE

## 2025-02-05 PROCEDURE — 84484 ASSAY OF TROPONIN QUANT: CPT | Performed by: STUDENT IN AN ORGANIZED HEALTH CARE EDUCATION/TRAINING PROGRAM

## 2025-02-05 PROCEDURE — 85027 COMPLETE CBC AUTOMATED: CPT | Performed by: INTERNAL MEDICINE

## 2025-02-05 PROCEDURE — 25010000002 NITROGLYCERIN 200 MCG/ML SOLUTION: Performed by: STUDENT IN AN ORGANIZED HEALTH CARE EDUCATION/TRAINING PROGRAM

## 2025-02-05 RX ORDER — ACETAMINOPHEN 160 MG/5ML
650 SOLUTION ORAL EVERY 4 HOURS PRN
Status: DISCONTINUED | OUTPATIENT
Start: 2025-02-05 | End: 2025-02-06 | Stop reason: HOSPADM

## 2025-02-05 RX ORDER — CARVEDILOL 6.25 MG/1
6.25 TABLET ORAL 2 TIMES DAILY WITH MEALS
Status: DISCONTINUED | OUTPATIENT
Start: 2025-02-06 | End: 2025-02-06 | Stop reason: HOSPADM

## 2025-02-05 RX ORDER — FENOFIBRATE 145 MG/1
145 TABLET, COATED ORAL DAILY
Status: DISCONTINUED | OUTPATIENT
Start: 2025-02-05 | End: 2025-02-06 | Stop reason: HOSPADM

## 2025-02-05 RX ORDER — ACETAMINOPHEN 650 MG/1
650 SUPPOSITORY RECTAL EVERY 4 HOURS PRN
Status: DISCONTINUED | OUTPATIENT
Start: 2025-02-05 | End: 2025-02-06 | Stop reason: HOSPADM

## 2025-02-05 RX ORDER — ACETAMINOPHEN 325 MG/1
650 TABLET ORAL EVERY 4 HOURS PRN
Status: DISCONTINUED | OUTPATIENT
Start: 2025-02-05 | End: 2025-02-06 | Stop reason: HOSPADM

## 2025-02-05 RX ORDER — FUROSEMIDE 10 MG/ML
40 INJECTION INTRAMUSCULAR; INTRAVENOUS ONCE
Status: COMPLETED | OUTPATIENT
Start: 2025-02-05 | End: 2025-02-05

## 2025-02-05 RX ORDER — ONDANSETRON 2 MG/ML
4 INJECTION INTRAMUSCULAR; INTRAVENOUS EVERY 6 HOURS PRN
Status: DISCONTINUED | OUTPATIENT
Start: 2025-02-05 | End: 2025-02-06 | Stop reason: HOSPADM

## 2025-02-05 RX ORDER — LISINOPRIL 20 MG/1
40 TABLET ORAL DAILY
Status: DISCONTINUED | OUTPATIENT
Start: 2025-02-05 | End: 2025-02-06 | Stop reason: HOSPADM

## 2025-02-05 RX ORDER — POLYETHYLENE GLYCOL 3350 17 G/17G
17 POWDER, FOR SOLUTION ORAL DAILY PRN
Status: DISCONTINUED | OUTPATIENT
Start: 2025-02-05 | End: 2025-02-06 | Stop reason: HOSPADM

## 2025-02-05 RX ORDER — ENOXAPARIN SODIUM 100 MG/ML
40 INJECTION SUBCUTANEOUS EVERY 24 HOURS
Status: DISCONTINUED | OUTPATIENT
Start: 2025-02-05 | End: 2025-02-06 | Stop reason: HOSPADM

## 2025-02-05 RX ORDER — GUAIFENESIN/DEXTROMETHORPHAN 100-10MG/5
5 SYRUP ORAL EVERY 4 HOURS PRN
Status: DISCONTINUED | OUTPATIENT
Start: 2025-02-05 | End: 2025-02-06 | Stop reason: HOSPADM

## 2025-02-05 RX ORDER — NICOTINE POLACRILEX 4 MG
15 LOZENGE BUCCAL
Status: DISCONTINUED | OUTPATIENT
Start: 2025-02-05 | End: 2025-02-06 | Stop reason: HOSPADM

## 2025-02-05 RX ORDER — IPRATROPIUM BROMIDE AND ALBUTEROL SULFATE 2.5; .5 MG/3ML; MG/3ML
3 SOLUTION RESPIRATORY (INHALATION) EVERY 4 HOURS PRN
Status: DISCONTINUED | OUTPATIENT
Start: 2025-02-05 | End: 2025-02-06 | Stop reason: HOSPADM

## 2025-02-05 RX ORDER — BENZONATATE 100 MG/1
100 CAPSULE ORAL 3 TIMES DAILY PRN
Status: DISCONTINUED | OUTPATIENT
Start: 2025-02-05 | End: 2025-02-06 | Stop reason: HOSPADM

## 2025-02-05 RX ORDER — FUROSEMIDE 10 MG/ML
40 INJECTION INTRAMUSCULAR; INTRAVENOUS EVERY 12 HOURS
Status: DISCONTINUED | OUTPATIENT
Start: 2025-02-05 | End: 2025-02-06

## 2025-02-05 RX ORDER — ASPIRIN 81 MG/1
81 TABLET ORAL DAILY
Status: DISCONTINUED | OUTPATIENT
Start: 2025-02-05 | End: 2025-02-06 | Stop reason: HOSPADM

## 2025-02-05 RX ORDER — SODIUM CHLORIDE 9 MG/ML
40 INJECTION, SOLUTION INTRAVENOUS AS NEEDED
Status: DISCONTINUED | OUTPATIENT
Start: 2025-02-05 | End: 2025-02-06 | Stop reason: HOSPADM

## 2025-02-05 RX ORDER — SODIUM CHLORIDE 0.9 % (FLUSH) 0.9 %
10 SYRINGE (ML) INJECTION AS NEEDED
Status: DISCONTINUED | OUTPATIENT
Start: 2025-02-05 | End: 2025-02-06 | Stop reason: HOSPADM

## 2025-02-05 RX ORDER — SODIUM CHLORIDE 0.9 % (FLUSH) 0.9 %
10 SYRINGE (ML) INJECTION EVERY 12 HOURS SCHEDULED
Status: DISCONTINUED | OUTPATIENT
Start: 2025-02-05 | End: 2025-02-06 | Stop reason: HOSPADM

## 2025-02-05 RX ORDER — AMLODIPINE BESYLATE 5 MG/1
10 TABLET ORAL DAILY
Status: DISCONTINUED | OUTPATIENT
Start: 2025-02-05 | End: 2025-02-05

## 2025-02-05 RX ORDER — DEXTROSE MONOHYDRATE 25 G/50ML
25 INJECTION, SOLUTION INTRAVENOUS
Status: DISCONTINUED | OUTPATIENT
Start: 2025-02-05 | End: 2025-02-06 | Stop reason: HOSPADM

## 2025-02-05 RX ORDER — BISACODYL 5 MG/1
5 TABLET, DELAYED RELEASE ORAL DAILY PRN
Status: DISCONTINUED | OUTPATIENT
Start: 2025-02-05 | End: 2025-02-06 | Stop reason: HOSPADM

## 2025-02-05 RX ORDER — BISACODYL 10 MG
10 SUPPOSITORY, RECTAL RECTAL DAILY PRN
Status: DISCONTINUED | OUTPATIENT
Start: 2025-02-05 | End: 2025-02-06 | Stop reason: HOSPADM

## 2025-02-05 RX ORDER — AMOXICILLIN 250 MG
2 CAPSULE ORAL 2 TIMES DAILY
Status: DISCONTINUED | OUTPATIENT
Start: 2025-02-05 | End: 2025-02-06 | Stop reason: HOSPADM

## 2025-02-05 RX ORDER — INSULIN LISPRO 100 [IU]/ML
2-7 INJECTION, SOLUTION INTRAVENOUS; SUBCUTANEOUS
Status: DISCONTINUED | OUTPATIENT
Start: 2025-02-05 | End: 2025-02-06 | Stop reason: HOSPADM

## 2025-02-05 RX ADMIN — SULFUR HEXAFLUORIDE 4 ML: KIT at 08:39

## 2025-02-05 RX ADMIN — ENOXAPARIN SODIUM 40 MG: 100 INJECTION SUBCUTANEOUS at 01:21

## 2025-02-05 RX ADMIN — MUPIROCIN 1 APPLICATION: 20 OINTMENT TOPICAL at 01:21

## 2025-02-05 RX ADMIN — ASPIRIN 81 MG: 81 TABLET, COATED ORAL at 10:32

## 2025-02-05 RX ADMIN — MUPIROCIN 1 APPLICATION: 20 OINTMENT TOPICAL at 10:34

## 2025-02-05 RX ADMIN — SENNOSIDES AND DOCUSATE SODIUM 2 TABLET: 50; 8.6 TABLET ORAL at 10:32

## 2025-02-05 RX ADMIN — LISINOPRIL 40 MG: 20 TABLET ORAL at 10:32

## 2025-02-05 RX ADMIN — FENOFIBRATE 145 MG: 145 TABLET, FILM COATED ORAL at 10:32

## 2025-02-05 RX ADMIN — AMLODIPINE BESYLATE 10 MG: 5 TABLET ORAL at 10:32

## 2025-02-05 RX ADMIN — FUROSEMIDE 40 MG: 10 INJECTION, SOLUTION INTRAMUSCULAR; INTRAVENOUS at 17:42

## 2025-02-05 RX ADMIN — MUPIROCIN 1 APPLICATION: 20 OINTMENT TOPICAL at 21:01

## 2025-02-05 RX ADMIN — Medication 10 ML: at 10:34

## 2025-02-05 RX ADMIN — ENOXAPARIN SODIUM 40 MG: 100 INJECTION SUBCUTANEOUS at 21:01

## 2025-02-05 RX ADMIN — Medication 10 ML: at 21:01

## 2025-02-05 RX ADMIN — OSELTAMIVIR PHOSPHATE 30 MG: 30 CAPSULE ORAL at 21:01

## 2025-02-05 RX ADMIN — ACETAMINOPHEN 650 MG: 325 TABLET, FILM COATED ORAL at 23:59

## 2025-02-05 RX ADMIN — NITROGLYCERIN 5 MCG/MIN: 20 INJECTION INTRAVENOUS at 14:48

## 2025-02-05 RX ADMIN — Medication 10 ML: at 01:21

## 2025-02-05 RX ADMIN — OSELTAMIVIR PHOSPHATE 30 MG: 30 CAPSULE ORAL at 10:32

## 2025-02-05 RX ADMIN — OSELTAMIVIR PHOSPHATE 75 MG: 75 CAPSULE ORAL at 01:19

## 2025-02-05 RX ADMIN — FUROSEMIDE 40 MG: 10 INJECTION, SOLUTION INTRAMUSCULAR; INTRAVENOUS at 10:32

## 2025-02-05 NOTE — PLAN OF CARE
Problem: Noninvasive Ventilation Acute  Goal: Effective Unassisted Ventilation and Oxygenation  Outcome: Progressing  Intervention: Monitor and Manage Noninvasive Ventilation  Flowsheets (Taken 2/5/2025 0017)  Airway/Ventilation Management:   airway patency maintained   positive pressure ventilation provided   oxygen therapy provided  NPPV/CPAP Maintenance: proper fit/secure   Goal Outcome Evaluation:

## 2025-02-05 NOTE — CASE MANAGEMENT/SOCIAL WORK
Discharge Planning Assessment   Gerry     Patient Name: Abebe Crockett  MRN: [id number]  Today's Date: 2/5/2025    Admit Date: 2/4/2025    Plan: DCP; Home with family   Discharge Needs Assessment       Row Name 02/05/25 1315       Living Environment    People in Home spouse    Name(s) of People in Home jessica Suh    Current Living Arrangements home    Duration at Residence 30 years    Potentially Unsafe Housing Conditions none    In the past 12 months has the electric, gas, oil, or water company threatened to shut off services in your home? No    Primary Care Provided by self    Provides Primary Care For no one    Family Caregiver if Needed spouse    Family Caregiver Names jessica Suh    Quality of Family Relationships helpful;involved;supportive    Able to Return to Prior Arrangements yes       Resource/Environmental Concerns    Resource/Environmental Concerns none    Transportation Concerns none       Transportation Needs    In the past 12 months, has lack of transportation kept you from medical appointments or from getting medications? no    In the past 12 months, has lack of transportation kept you from meetings, work, or from getting things needed for daily living? No       Food Insecurity    Within the past 12 months, you worried that your food would run out before you got the money to buy more. Never true    Within the past 12 months, the food you bought just didn't last and you didn't have money to get more. Never true       Transition Planning    Patient/Family Anticipates Transition to home with family    Patient/Family Anticipated Services at Transition none    Transportation Anticipated family or friend will provide       Discharge Needs Assessment    Readmission Within the Last 30 Days no previous admission in last 30 days    Equipment Currently Used at Home bp cuff;cpap;grab bar    Concerns to be Addressed denies needs/concerns at this time    Do you want help finding or keeping work or a job? I  do not need or want help    Do you want help with school or training? For example, starting or completing job training or getting a high school diploma, GED or equivalent No    Anticipated Changes Related to Illness none    Equipment Needed After Discharge none                   Discharge Plan       Row Name 02/05/25 4095       Plan    Plan DCP; Home with family    Plan Comments DCP is to return home where he lives with his wife Araceli. Spoke with pt, his wife Araceli and sister Pedro at bedside. Permission given to discuss DCP with  family present. Pt confirmed demographics. States yes to Living Will/POA, copy requested. PCP; Dr. Dre Henry, pharmacy is [facility].  Declined meds to bed. Pt has a BP cuff and cpap at home. Cpap provided by  [facility]. No new DME needs anticipated at this time. Pt states is independent with ADL's and mobility. Denies financial strain or food insecurity. IMM delivered at the time of this conversation. Family to transport home when medically stable for discharge.                  Continued Care and Services - Admitted Since 2/4/2025    No active coordination exists for this encounter.          Demographic Summary       Row Name 02/05/25 2558       General Information    Admission Type inpatient    Arrived From emergency department    Required Notices Provided Important Message from Medicare    Referral Source admission list    Reason for Consult discharge planning    Preferred Language English       Contact Information    Permission Granted to Share Info With ;family/designee                   Functional Status       Row Name 02/05/25 1319       Functional Status    Usual Activity Tolerance moderate    Current Activity Tolerance moderate       Physical Activity    On average, how many days per week do you engage in moderate to strenuous exercise (like a brisk walk)? 0 days    On average, how many minutes do you engage in exercise at this  level? 0 min    Number of minutes of exercise per week 0       Assessment of Health Literacy    How often do you have someone help you read hospital materials? Never    How often do you have problems learning about your medical condition because of difficulty understanding written information? Never    How often do you have a problem understanding what is told to you about your medical condition? Never    How confident are you filling out medical forms by yourself? Extremely    Health Literacy Good       Functional Status, IADL    Medications independent    Meal Preparation independent    Housekeeping independent    Laundry independent    Shopping independent       Mental Status    General Appearance WDL WDL       Mental Status Summary    Recent Changes in Mental Status/Cognitive Functioning no changes       Employment/    Employment Status retired                   Psychosocial    No documentation.                  Abuse/Neglect    No documentation.                  Legal    No documentation.                  Substance Abuse    No documentation.                  Patient Forms    No documentation.                     Suha Mejia RN

## 2025-02-05 NOTE — THERAPY EVALUATION
PT evaluation orders received today. PT evaluation held as pt is not medically appropriate at this time. Pt on continuous nitroglycerin drip. PT will follow up tomorrow.

## 2025-02-05 NOTE — PROGRESS NOTES
[facility]    Name:  Abebe Crockett   Age:  84 y.o.  Sex:  male  :  1940  MRN:  [id number]   Visit Number:  [id number]  Admission Date:  2025  Date Of Service:  25  Primary Care Physician:  Dre Henry MD     LOS: 1 day :    Chief Complaint:      Shortness of breath.    Subjective:    Patient examined.  Resting comfortably in bed.  Still on nitroglycerin drip.  Stable on room air.  Wife at bedside.  Overall states he is feeling better.  Diuresing well.    Hospital Course:    Abebe Crockett is an 84-year-old male with history of hypertension, diabetes mellitus type 2, sleep apnea was brought to the emergency room by his wife with symptoms of shortness of breath.  In the ED, he was afebrile with initial pulse of 67 and a blood pressure of 203/119.  Patient subsequently developed tachycardia in the 130s and also dropped his saturation to 73% on room air.  He was placed on 6 L of nasal cannula oxygen with improvement in his saturations to 90%.  Chest x-ray showed evidence of bilateral pulmonary edema.  EKG showed sinus tachycardia with lateral wall ST-T changes.  Initial troponin 57.  proBNP 14,205.  CMP revealed a sodium of 132, creatinine 1.36 (baseline), glucose 154.  LFT was unremarkable.  CBC was unremarkable except for a white count of 12.88.  COVID and flu test showed positive for influenza A.  Blood cultures were obtained.  Chest x-ray showed evidence of pulmonary edema bilaterally.  Patient was given 80 mg of IV Lasix, Tamiflu in the emergency room and was subsequently admitted to the medical ICU for management of hypertensive urgency, acute pulmonary edema, acute influenza A bronchitis.     Review of Systems:     All systems were reviewed and negative except as mentioned in subjective, assessment and plan.    Vital Signs:    Temp:  [98.4 °F (36.9 °C)-98.9 °F (37.2 °C)] 98.8 °F (37.1 °C)  Heart Rate:  [] 101  Resp:  [22-26] 25  BP:  "()/() 152/106    Intake and output:    I/O last 3 completed shifts:  In: 17 [I.V.:17]  Out: 775 [Urine:775]  I/O this shift:  In: 460 [P.O.:460]  Out: 350 [Urine:350]    Physical Examination:    General Appearance:  Alert and cooperative.    Head:  Atraumatic and normocephalic.   Eyes: Conjunctivae and sclerae normal, no icterus. No pallor.   Throat: No oral lesions, no thrush, oral mucosa moist.   Neck: Supple, trachea midline, no thyromegaly.   Lungs:   Nonlabored breathing.  No wheezing or crackles.    Heart:  Normal S1 and S2, no murmur, no gallop, no rub. No JVD.   Abdomen:   Normal bowel sounds, no masses, no organomegaly. Soft, nontender, nondistended, no rebound tenderness.   Extremities: Supple, no edema, no cyanosis, no clubbing.   Skin: No bleeding or rash.   Neurologic: Alert and oriented x 3. No facial asymmetry. Moves all four limbs. No tremors.      Laboratory results:    Results from last 7 days   Lab Units 02/05/25 0536 02/04/25 2131 02/03/25  0908   SODIUM mmol/L 131* 132* 132*   POTASSIUM mmol/L 4.1 4.5 5.1   CHLORIDE mmol/L 96* 97* 96*   CO2 mmol/L 24.9 25.5 26.4   BUN mg/dL 21 15 20   CREATININE mg/dL 1.35* 1.36* 1.41*   CALCIUM mg/dL 8.5* 9.6 9.5   BILIRUBIN mg/dL  --  0.4  --    ALK PHOS U/L  --  48  --    ALT (SGPT) U/L  --  20  --    AST (SGOT) U/L  --  34  --    GLUCOSE mg/dL 132* 154* 131*     Results from last 7 days   Lab Units 02/05/25 0536 02/04/25 2131 02/03/25  0908   WBC 10*3/mm3 9.98 12.88* 7.15   HEMOGLOBIN g/dL 12.0* 14.3 12.8*   HEMATOCRIT % 34.3* 40.8 37.9   PLATELETS 10*3/mm3 262 337 325         Results from last 7 days   Lab Units 02/05/25  0012 02/04/25  2131   HSTROP T ng/L 188* 57*         No results for input(s): \"PHART\", \"VOM6MXW\", \"PO2ART\", \"VEV2JMS\", \"BASEEXCESS\" in the last 8760 hours.   I have reviewed the patient's laboratory results.    Radiology results:    XR Chest 1 View    Result Date: 2/5/2025  PROCEDURE: XR CHEST 1 VW-    HISTORY: Shortness of " breath/flulike illness  COMPARISON: None.  FINDINGS: The heart is borderline in size. There is pulmonary vascular congestion. Moderate bilateral interstitial disease is seen with small bilateral pleural effusions. There is bibasilar atelectasis or infiltrate. Airspace disease is seen in the right upper lobe. There is no pneumothorax. There are no acute osseous abnormalities.      Impression: Findings concerning for CHF/edema with superimposed pneumonia. Continued follow-up is recommended.     Images were reviewed, interpreted, and dictated by Dr. Erinn Mcclelland MD Transcribed by Hortencia Smart PA-C.  This report was signed and finalized on 2/5/2025 12:00 PM by Erinn Mcclelland MD.      CT Angiogram Chest Pulmonary Embolism    Result Date: 2/4/2025  FINAL REPORT TECHNIQUE: null CLINICAL HISTORY: Hypoxia and tachycardia COMPARISON: null FINDINGS: CT angiography chest with contrast. 3D Postprocessing. Comparison: None Findings: Cardiomegaly. Moderate to severe coronary artery calcifications. Unremarkable thoracic aorta and great vessels. No aneurysm. No acute pulmonary embolus. The visualized thyroid and mediastinum are unremarkable. Large bilateral pleural effusions. Perihilar ground-glass opacities and septal thickening. The subsegmental pulmonary artery evaluation in the lower lungs is suboptimal due to breathing artifacts. 2.2 cm cyst segment 1 liver. Ankylosis of the thoracic spine. No acute fracture.     Impression: IMPRESSION: 1. No acute pulmonary embolus within limitations of the exam. 2. Findings suggesting heart failure. Authenticated and Electronically Signed by Elsa Higuera MD on 02/04/2025 11:05:01 PM   I have reviewed the patient's radiology reports.    Medication Review:     I have reviewed the patient's active and prn medications.     Problem List:      Hypertensive emergency    Influenza A    Acute respiratory failure with hypoxia    Acute pulmonary edema      Assessment:    Hypertensive emergency,  POA.  Acute pulmonary edema secondary to #1, POA.  Acute hypoxic respiratory failure secondary to #3, POA.  Acute influenza A bronchitis, POA.  Demand ischemia with elevated troponin secondary to #1, POA.  Chronic kidney disease stage III.  Essential hypertension.  Diabetes mellitus type 2.    Plan:    Hypertensive emergency/pulmonary edema.  Acute exacerbation of systolic/diastolic heart  - Patient will be continued on nitroglycerin drip.  - Patient has received 80 mg of IV Lasix in the emergency room.  - Will continue Lasix 40mg BID  - Restarted home medications including lisinopril.  - Echocardiogram revealed EF 30 to 35% with grade 2 diastolic dysfunction.  Differential includes possible viral myocarditis.  - Dr. Myles, cardiology consulted  -Discontinue amlodipine.  Start Coreg for goal-directed medical therapy     Acute influenza A bronchitis/respiratory failure.  - Nasal cannula oxygen to maintain saturations above 90%.  - Continue oseltamivir.  - Bronchodilators as needed.     Elevated troponin.  - Likely secondary to hypertensive emergency in the setting of chronic kidney disease.  - Do not suspect acute coronary syndrome.  - Continue home dose of aspirin.     Diabetes mellitus type 2.  - Subcutaneous insulin protocol for coverage.  - A1c 5.8%     Transfer out of the ICU.    DVT Prophylaxis: Lovenox  Code Status: Full  Diet: Cardiac/Diabetic, fluid restriction  Discharge Plan: Discharge home tomorrow    David Aguilar DO  02/05/25  14:39 EST    Dictated utilizing Dragon dictation.

## 2025-02-05 NOTE — H&P
[facility]   HISTORY AND PHYSICAL      Name:  Abebe Crockett   Age:  84 y.o.  Sex:  male  :  1940  MRN:  [id number]   Visit Number:  [id number]  Admission Date:  2025  Date Of Service:  25  Primary Care Physician:  Dre Henry MD    Chief Complaint:     Shortness of breath.    History Of Presenting Illness:      Abebe Crockett is an 84-year-old male with history of hypertension, diabetes mellitus type 2, sleep apnea was brought to the emergency room by his wife with symptoms of shortness of breath and cough that has been going on for the last 2 to 3 days.  Patient lives with his wife and was in his usual state of health until about 3 days ago when he developed cough and chest congestion associated with progressive shortness of breath.  He usually lies down flat but has been reclining to get better sleep in the last couple of days.  He has also noticed bilateral leg swelling.  Patient recently followed up with Dr. Myles in 2024 for preoperative clearance of right inguinal surgery and at that time he was doing well.  He does not have prior history of congestive heart failure.  According to the wife, his surgery was postponed due to recent snowstorm.    In the ED, he was afebrile with initial pulse of 67 and a blood pressure of 203/119.  Patient subsequently developed tachycardia in the 130s and also dropped his saturation to 73% on room air.  He was placed on 6 L of nasal cannula oxygen with improvement in his saturations to 90%.  Chest x-ray showed evidence of bilateral pulmonary edema.  EKG showed sinus tachycardia with lateral wall ST-T changes.  Initial troponin 57.  proBNP 14,205.  CMP revealed a sodium of 132, creatinine 1.36 (baseline), glucose 154.  LFT was unremarkable.  CBC was unremarkable except for a white count of 12.88.  COVID and flu test showed positive for influenza A.  Blood cultures were obtained.  Chest x-ray showed evidence of pulmonary  edema bilaterally.  Patient was given 80 mg of IV Lasix, Tamiflu in the emergency room and was subsequently admitted to the medical ICU for management of hypertensive urgency, acute pulmonary edema, acute influenza A bronchitis.    Review Of Systems:    All systems were reviewed and negative except as mentioned in history of presenting illness, assessment and plan.    Past Medical History: Patient's  has a past medical history of Diabetes mellitus, Hypertension, and Sleep apnea.    Past Surgical History: Patient's  has a past surgical history that includes Colonoscopy.    Social History: Patient's  reports that he quit smoking about 60 years ago. His smoking use included cigarettes. He has never been exposed to tobacco smoke. He has never used smokeless tobacco. He reports that he does not drink alcohol and does not use drugs.    Family History:  Patient's family history includes Cancer in his father.     Allergies:      Atorvastatin    Home Medications:    Prior to Admission Medications       Prescriptions Last Dose Informant Patient Reported? Taking?    amLODIPine (NORVASC) 10 MG tablet   No No    Take 1 tablet by mouth once daily    ASPIRIN 81 PO   Yes No    Take  by mouth.    clotrimazole-betamethasone (LOTRISONE) 1-0.05 % cream   Yes No    fenofibrate (TRICOR) 145 MG tablet  Medication Bottle Yes No    lisinopril (PRINIVIL,ZESTRIL) 40 MG tablet   No No    Take 1 tablet by mouth once daily    metFORMIN (GLUCOPHAGE) 500 MG tablet   Yes No    Take  by mouth.    NON FORMULARY   Yes No    NON FORMULARY   Yes No    NON FORMULARY   Yes No    vitamin D3 125 MCG (5000 UT) capsule capsule   Yes No    Take 1 capsule by mouth Daily.     ED Medications:    Medications   sodium chloride 0.9 % flush 10 mL (has no administration in time range)   nitroglycerin (NITROSTAT) SL tablet 0.4 mg (0.4 mg Sublingual Given 2/4/25 2151)   Pharmacy to Dose Oseltamivir (TAMIFLU) (has no administration in time range)   oseltamivir (TAMIFLU)  "capsule 75 mg (0 mg Oral Hold 2/4/25 2241)   oseltamivir (TAMIFLU) capsule 30 mg (has no administration in time range)   nitroglycerin (TRIDIL) 200 mcg/ml infusion (0 mcg/min Intravenous Stopped 2/4/25 2315)   furosemide (LASIX) injection 80 mg (80 mg Intravenous Given 2/4/25 2150)   iopamidol (ISOVUE-300) 61 % injection 100 mL (100 mL Intravenous Given 2/4/25 2229)     Vital Signs:  Temp:  [98.5 °F (36.9 °C)] 98.5 °F (36.9 °C)  Heart Rate:  [] 113  Resp:  [24] 24  BP: ()/() 97/75        02/04/25 2119   Weight: 76.2 kg (168 lb)     Body mass index is 27.96 kg/m².    Physical Exam:     Most recent vital Signs: BP 97/75   Pulse 113   Temp 98.5 °F (36.9 °C) (Oral)   Resp 24   Ht 165.1 cm (65\")   Wt 76.2 kg (168 lb)   SpO2 96%   BMI 27.96 kg/m²     Physical Exam  Constitutional:       General: He is in acute distress.      Appearance: He is ill-appearing.   HENT:      Head: Normocephalic and atraumatic.      Right Ear: External ear normal.      Left Ear: External ear normal.      Nose: Nose normal.      Mouth/Throat:      Mouth: Mucous membranes are moist.   Eyes:      Extraocular Movements: Extraocular movements intact.      Conjunctiva/sclera: Conjunctivae normal.   Cardiovascular:      Rate and Rhythm: Normal rate and regular rhythm.      Pulses: Normal pulses.      Heart sounds: Normal heart sounds. No murmur heard.  Pulmonary:      Effort: Respiratory distress present.      Breath sounds: Rales present. No wheezing.   Abdominal:      General: Bowel sounds are normal.      Palpations: Abdomen is soft.      Tenderness: There is no abdominal tenderness. There is no guarding or rebound.      Comments: Right inguinal hernia noted.   Musculoskeletal:         General: Normal range of motion.      Cervical back: Neck supple.      Right lower leg: Edema present.      Left lower leg: Edema present.      Comments: 2+ pitting edema noted bilateral lower extremities up to the knees.   Skin:     " General: Skin is warm.      Findings: No erythema or rash.   Neurological:      General: No focal deficit present.      Mental Status: He is alert and oriented to person, place, and time. Mental status is at baseline.   Psychiatric:         Mood and Affect: Mood normal.         Behavior: Behavior normal.       Laboratory data:    I have reviewed the labs done in the emergency room.    Results from last 7 days   Lab Units 02/04/25 2131 02/03/25  0908   SODIUM mmol/L 132* 132*   POTASSIUM mmol/L 4.5 5.1   CHLORIDE mmol/L 97* 96*   CO2 mmol/L 25.5 26.4   BUN mg/dL 15 20   CREATININE mg/dL 1.36* 1.41*   CALCIUM mg/dL 9.6 9.5   BILIRUBIN mg/dL 0.4  --    ALK PHOS U/L 48  --    ALT (SGPT) U/L 20  --    AST (SGOT) U/L 34  --    GLUCOSE mg/dL 154* 131*     Results from last 7 days   Lab Units 02/04/25 2131 02/03/25  0908   WBC 10*3/mm3 12.88* 7.15   HEMOGLOBIN g/dL 14.3 12.8*   HEMATOCRIT % 40.8 37.9   PLATELETS 10*3/mm3 337 325       Results from last 7 days   Lab Units 02/04/25  2131   HSTROP T ng/L 57*     Results from last 7 days   Lab Units 02/04/25  2131   PROBNP pg/mL 14,205.0*     EKG:      EKG done in the emergency room was reviewed by me.  It shows sinus tachycardia at 132 bpm.  Normal axis.  Frequent PVCs noted.  ST depressions noted in the lateral chest leads.  Poor R wave progression noted in the chest leads.    Radiology:    Portable chest x-ray done in the emergency room was reviewed by me.  It shows bilateral known homogeneous opacities with increased bronchovascular markings more prominent on the right side with prominence of the transverse fissure suggestive of pulmonary edema.  Official report is currently pending.    CT Angiogram Chest Pulmonary Embolism    Result Date: 2/4/2025  FINAL REPORT TECHNIQUE: null CLINICAL HISTORY: Hypoxia and tachycardia COMPARISON: null FINDINGS: CT angiography chest with contrast. 3D Postprocessing. Comparison: None Findings: Cardiomegaly. Moderate to severe coronary  artery calcifications. Unremarkable thoracic aorta and great vessels. No aneurysm. No acute pulmonary embolus. The visualized thyroid and mediastinum are unremarkable. Large bilateral pleural effusions. Perihilar ground-glass opacities and septal thickening. The subsegmental pulmonary artery evaluation in the lower lungs is suboptimal due to breathing artifacts. 2.2 cm cyst segment 1 liver. Ankylosis of the thoracic spine. No acute fracture.     IMPRESSION: 1. No acute pulmonary embolus within limitations of the exam. 2. Findings suggesting heart failure. Authenticated and Electronically Signed by Elsa Higuera MD on 02/04/2025 11:05:01 PM     Assessment:    Hypertensive emergency, POA.  Acute pulmonary edema secondary to #1, POA.  Acute hypoxic respiratory failure secondary to #3, POA.  Acute influenza A bronchitis, POA.  Demand ischemia with elevated troponin secondary to #1, POA.  Chronic kidney disease stage III.  Essential hypertension.  Diabetes mellitus type 2.    Plan:    Hypertensive emergency/pulmonary edema.  - Patient will be continued on nitroglycerin drip.  - Patient has received 80 mg of IV Lasix in the emergency room.  - Hold off on further Lasix until reevaluated in the morning.  - Restart home medications including amlodipine and lisinopril.  - Obtain 2D echocardiogram.  - Consult cardiology in AM.    Acute influenza A bronchitis/respiratory failure.  - Nasal cannula oxygen to maintain saturations above 90%.  - Continue oseltamivir.  - Bronchodilators as needed.    Elevated troponin.  - Likely secondary to hypertensive emergency in the setting of chronic kidney disease.  - Do not suspect acute coronary syndrome.  - Continue home dose of aspirin.    Diabetes mellitus type 2.  - Subcutaneous insulin protocol for coverage.  - Obtain hemoglobin A1c.    I have discussed the patient's condition and treatment plan with his wife Angeli who is at the bedside.    Risk Assessment: High  DVT Prophylaxis:  Enoxaparin  Code Status: Full  Diet: Cardiac diabetic      Trevor Posada MD  02/04/25  23:19 EST    Dictated utilizing Dragon dictation.

## 2025-02-05 NOTE — PLAN OF CARE
Goal Outcome Evaluation:  Plan of Care Reviewed With: patient        Progress: improving  Outcome Evaluation: Pt still requiring nitro drip to decrease blood pressure.  Edema decreasing in feet and legs post lasix .  Pt breathing better and is more relaxed.

## 2025-02-05 NOTE — CONSULTS
[facility] Cardiology Consult Note    Abebe Crockett  1940  [id number]  25    Requesting Physician: No ref. provider found    Chief Complaint: Shortness of breath    History of Present Illness:   Mr. Abebe Crockett is a  84y.o. male who is being seen by cardiology for shortness of breath.  The patient has a past medical history significant for hypertension, diabetes mellitus, dyslipidemia, ARIELLA, and anemia.   He has a past cardiac history significant for diastolic dysfunction, without a history of clinical CHF.  He presented to the [facility] emergency department yesterday for evaluation of shortness of breath.  The patient reports shortness of breath associated with a cough that has been ongoing for approximately 2 days 3 days prior to presentation.  He does report his shortness of breath was worse with lying down and improved with sitting up.  He denies any chest pain or exertional chest discomfort.    Upon evaluation in the emergency department, the patient was found to have an O2 sat of 73% on room air which improved with supplemental O2 via nasal cannula.  He was also found to be severely hypertensive, with a systolic BP up to 224 mmHg.  A proBNP was found to be elevated at 14,205.  An initial high-sensitivity troponin was 57, rising to 188 on recheck.  His creatinine was found to be elevated at 1.4, compared to 1.31-month ago.  He also tested positive for influenza A.  Given his elevated troponin level, cardiology has been consulted for further recommendations.     Past Cardiac Testin. Last Coronary Angio: None  2. Prior Stress Testing: None  3. Last Echo:  2019              1.  Normal left ventricular size and systolic function, LVEF 65-70%.              2.  Mild concentric LVH.              3.  Impaired LV diastolic filling consistent with grade 1 diastolic dysfunction.              4.  Normal right ventricular size and systolic function.              5.   Increased left atrial volume.              6.  Trace MR, TR, PI.              7.  Mild aortic sclerosis without stenosis.  4. Prior Holter Monitor: None    Review of Systems:   Review of Systems   Constitutional:  Negative for activity change, appetite change, chills, diaphoresis, fatigue, fever, unexpected weight gain and unexpected weight loss.   Eyes:  Negative for blurred vision and double vision.   Respiratory:  Positive for cough and shortness of breath. Negative for chest tightness and wheezing.    Cardiovascular:  Negative for chest pain, palpitations and leg swelling.   Gastrointestinal:  Negative for abdominal pain, anal bleeding, blood in stool and GERD.   Endocrine: Negative for cold intolerance and heat intolerance.   Genitourinary:  Negative for hematuria.   Neurological:  Negative for dizziness, syncope, weakness and light-headedness.   Hematological:  Does not bruise/bleed easily.   Psychiatric/Behavioral:  Negative for depressed mood and stress. The patient is not nervous/anxious.        Past Medical History:   Past Medical History:   Diagnosis Date    Diabetes mellitus     Hypertension     Sleep apnea     CPAP COMPLIANT       Past Surgical History:   Past Surgical History:   Procedure Laterality Date    COLONOSCOPY         Family History:   Family History   Problem Relation Age of Onset    Cancer Father        Social History:   Social History     Socioeconomic History    Marital status:    Tobacco Use    Smoking status: Former     Current packs/day: 0.00     Types: Cigarettes     Quit date: 1965     Years since quittin.1     Passive exposure: Never    Smokeless tobacco: Never   Vaping Use    Vaping status: Never Used   Substance and Sexual Activity    Alcohol use: No    Drug use: No    Sexual activity: Defer       Medications:     Current Facility-Administered Medications:     acetaminophen (TYLENOL) tablet 650 mg, 650 mg, Oral, Q4H PRN **OR** acetaminophen (TYLENOL) 160 MG/5ML oral  solution 650 mg, 650 mg, Oral, Q4H PRN **OR** acetaminophen (TYLENOL) suppository 650 mg, 650 mg, Rectal, Q4H PRN, Trevor Posada MD    amLODIPine (NORVASC) tablet 10 mg, 10 mg, Oral, Daily, Trevor Posada MD, 10 mg at 02/05/25 1032    aspirin EC tablet 81 mg, 81 mg, Oral, Daily, Trevor Posada MD, 81 mg at 02/05/25 1032    benzonatate (TESSALON) capsule 100 mg, 100 mg, Oral, TID PRN, Trevor Posada MD    sennosides-docusate (PERICOLACE) 8.6-50 MG per tablet 2 tablet, 2 tablet, Oral, BID, 2 tablet at 02/05/25 1032 **AND** polyethylene glycol (MIRALAX) packet 17 g, 17 g, Oral, Daily PRN **AND** bisacodyl (DULCOLAX) EC tablet 5 mg, 5 mg, Oral, Daily PRN **AND** bisacodyl (DULCOLAX) suppository 10 mg, 10 mg, Rectal, Daily PRN, Trevor Posada MD    dextrose (D50W) (25 g/50 mL) IV injection 25 g, 25 g, Intravenous, Q15 Min PRN, Trevor Posada MD    dextrose (GLUTOSE) oral gel 15 g, 15 g, Oral, Q15 Min PRN, Trevor Posada MD    Enoxaparin Sodium (LOVENOX) syringe 40 mg, 40 mg, Subcutaneous, Q24H, Trevor Posada MD, 40 mg at 02/05/25 0121    fenofibrate (TRICOR) tablet 145 mg, 145 mg, Oral, Daily, Trevor Posada MD, 145 mg at 02/05/25 1032    glucagon (GLUCAGEN) injection 1 mg, 1 mg, Intramuscular, Q15 Min PRN, Trevor Posada MD    guaiFENesin-dextromethorphan (ROBITUSSIN DM) 100-10 MG/5ML syrup 5 mL, 5 mL, Oral, Q4H PRN, Trevor Posada MD    Insulin Lispro (humaLOG) injection 2-7 Units, 2-7 Units, Subcutaneous, 4x Daily AC & at Bedtime, Trevor Posada MD    ipratropium-albuterol (DUO-NEB) nebulizer solution 3 mL, 3 mL, Nebulization, Q4H PRN, Trevor Posada MD    lisinopril (PRINIVIL,ZESTRIL) tablet 40 mg, 40 mg, Oral, Daily, Trevor Posdaa MD, 40 mg at 02/05/25 1032    mupirocin (BACTROBAN) 2 % nasal ointment 1 Application, 1 Application, Each Nare, BID, Trevor Posada MD, 1 Application at 02/05/25 1033    nitroglycerin (NITROSTAT) SL tablet 0.4 mg, 0.4 mg, Sublingual, Q5 Min PRN, Jesus Askew MD, 0.4 mg at 02/04/25 8132     nitroglycerin (TRIDIL) 200 mcg/ml infusion, 5-200 mcg/min, Intravenous, Titrated, Jesus Askew MD, Stopped at 02/05/25 1029    ondansetron (ZOFRAN) injection 4 mg, 4 mg, Intravenous, Q6H PRN, Trevor Posada MD    oseltamivir (TAMIFLU) capsule 30 mg, 30 mg, Oral, Q12H, Jesus Askew MD, 30 mg at 02/05/25 1032    Pharmacy to Dose enoxaparin (LOVENOX), , Not Applicable, Continuous PRN, Trevor Posada MD    Pharmacy to Dose Oseltamivir (TAMIFLU), , Not Applicable, Continuous PRJamilah CHUA Forest, MD    [COMPLETED] Insert Peripheral IV, , , Once **AND** sodium chloride 0.9 % flush 10 mL, 10 mL, Intravenous, PRNJamilah Forest, MD    sodium chloride 0.9 % flush 10 mL, 10 mL, Intravenous, Q12H, Trevor Posada MD, 10 mL at 02/05/25 1034    sodium chloride 0.9 % flush 10 mL, 10 mL, Intravenous, PRN, Trevor Posada MD    sodium chloride 0.9 % infusion 40 mL, 40 mL, Intravenous, PRSwetha CHUA Roshan, MD    Allergies:   Allergies   Allergen Reactions    Atorvastatin Other (See Comments)     MEMORY ISSUES       Physical Exam:  Vital Signs:   Vitals:    02/05/25 1000 02/05/25 1027 02/05/25 1030 02/05/25 1100   BP:  146/97 148/94 (!) 157/113   BP Location:  Right arm  Right arm   Patient Position:  Lying  Lying   Pulse: 90 90  100   Resp: 23 24  24   Temp:       TempSrc:       SpO2: 98% 100%  96%   Weight:       Height:           Physical Exam  Vitals and nursing note reviewed.   Constitutional:       General: He is not in acute distress.     Appearance: Normal appearance. He is not diaphoretic.   HENT:      Head: Normocephalic and atraumatic.   Cardiovascular:      Rate and Rhythm: Normal rate and regular rhythm.      Heart sounds: No murmur heard.  Pulmonary:      Effort: Pulmonary effort is normal. No respiratory distress.      Breath sounds: Normal breath sounds. No stridor. No wheezing, rhonchi or rales.   Abdominal:      General: Bowel sounds are normal. There is no distension.      Palpations: Abdomen is soft.       Tenderness: There is no abdominal tenderness. There is no guarding or rebound.   Musculoskeletal:         General: Swelling present. Normal range of motion.      Cervical back: Neck supple. No tenderness.   Skin:     General: Skin is warm and dry.   Neurological:      General: No focal deficit present.      Mental Status: He is alert and oriented to person, place, and time.   Psychiatric:         Mood and Affect: Mood normal.         Behavior: Behavior normal.         Results Review:   Results from last 7 days   Lab Units 02/05/25  0536 02/04/25  2131   SODIUM mmol/L 131* 132*   POTASSIUM mmol/L 4.1 4.5   CHLORIDE mmol/L 96* 97*   CO2 mmol/L 24.9 25.5   BUN mg/dL 21 15   CREATININE mg/dL 1.35* 1.36*   CALCIUM mg/dL 8.5* 9.6   BILIRUBIN mg/dL  --  0.4   ALK PHOS U/L  --  48   ALT (SGPT) U/L  --  20   AST (SGOT) U/L  --  34   GLUCOSE mg/dL 132* 154*     Results from last 7 days   Lab Units 02/05/25  0012 02/04/25  2131   HSTROP T ng/L 188* 57*     Results from last 7 days   Lab Units 02/05/25  0536 02/04/25  2131 02/03/25  0908   WBC 10*3/mm3 9.98 12.88* 7.15   HEMOGLOBIN g/dL 12.0* 14.3 12.8*   HEMATOCRIT % 34.3* 40.8 37.9   PLATELETS 10*3/mm3 262 337 325             Results from last 7 days   Lab Units 02/05/25  0536   CHOLESTEROL mg/dL 174   TRIGLYCERIDES mg/dL 89   HDL CHOL mg/dL 30*   LDL CHOL mg/dL 127*       I personally viewed and interpreted the patient's EKG/Telemetry data     Assessment:  1.  Acute systolic heart failure  2.  Hypertensive emergency  3.  Elevated troponin level  4.  Acute influenza A  5.  Acute hypoxic respiratory failure  6.  Stage III CKD  7.  Type 2 diabetes mellitus    Plan:    1.  Acute systolic heart failure  -- Prior echocardiogram demonstrated preserved LV systolic function, echocardiogram today shows moderate to severely reduced LVEF 30-35%  -- Several potential etiologies for newly diagnosed cardiomyopathy including ischemic cardiomyopathy, myocarditis in the setting of influenza,  hypertensive emergency, etc.  -- Recommend continuing IV Lasix for diuresis  -- Discontinue amlodipine, start carvedilol  -- Consider transitioning lisinopril to Entresto as outpatient  -- Start SGLT2 as outpatient  -- Will discuss ischemic evaluation following resolution of influenza    2.  Elevated troponin level  -- No current anginal symptoms  -- No ischemic changes on ECG  -- Suspect elevated troponin level likely demand ischemia in the setting of hypertensive urgency, influenza, CKD  -- Continue aspirin  -- Discuss ischemic evaluation, as above        Thank you for allowing me to participate in the care of your patient. Please do not hesitate to contact me with additional questions or concerns.     FLORES Myles MD  Interventional Cardiology   02/05/25  11:43 EST

## 2025-02-05 NOTE — PLAN OF CARE
Problem: Noninvasive Ventilation Acute  Goal: Effective Unassisted Ventilation and Oxygenation  Outcome: Progressing   Goal Outcome Evaluation: Patient wearing QHS and PRN.    RT EQUIPMENT DEVICE RELATED - SKIN ASSESSMENT    RT Medical Equipment/Device:  NIV Mask: Under-the-nose  size: b    Skin Assessment: Cheek: Intact    Device Skin Pressure Protection: Pressure points protected    Nurse Notification: Karrie Oliver, RRT

## 2025-02-05 NOTE — PROGRESS NOTES
RT EQUIPMENT DEVICE RELATED - SKIN ASSESSMENT    Bello Score:        RT Medical Equipment/Device:     NIV Mask:  Under-the-nose   size:  b    Skin Assessment:      Nose:  Intact    Device Skin Pressure Protection:   none    Nurse Notification:  No    Angeli Suh, CRT

## 2025-02-05 NOTE — PLAN OF CARE
Goal Outcome Evaluation:              Outcome Evaluation: Nitro gtt still running, 2 doses of lasix given, patient has not eatten much this shift.

## 2025-02-05 NOTE — ED PROVIDER NOTES
[facility] EMERGENCY DEPARTMENT  Emergency Department Encounter  Emergency Medicine Physician Note       Pt Name: Abebe Crockett  MRN: [id number]  Pt :   1940  Room Number:  I03/1  Date of encounter:  2025  PCP: Dre Henry MD  ED Provider: Jesus Askew MD    Historian: Patient and wife      HPI:  Chief Complaint: Difficulty breathing        Context: Abebe Crockett is a 84 y.o. male who presents to the ED c/o difficulty breathing.  Patient reportedly has had increased work of breathing since .  Also has had cough.  No reported fever.  Wife states patient does not want to come to the hospital but she was finally able to convince him tonight.  No reported history of CHF or heart problems.  Does have sleep apnea and wears CPAP at night.  Has been having to sit upright and not been able to lay down for several days due to shortness of breath.      PAST MEDICAL HISTORY  Past Medical History:   Diagnosis Date    Diabetes mellitus     Hypertension     Sleep apnea     CPAP COMPLIANT         PAST SURGICAL HISTORY  Past Surgical History:   Procedure Laterality Date    COLONOSCOPY           FAMILY HISTORY  Family History   Problem Relation Age of Onset    Cancer Father          SOCIAL HISTORY  Social History     Socioeconomic History    Marital status:    Tobacco Use    Smoking status: Former     Current packs/day: 0.00     Types: Cigarettes     Quit date: 1965     Years since quittin.1     Passive exposure: Never    Smokeless tobacco: Never   Vaping Use    Vaping status: Never Used   Substance and Sexual Activity    Alcohol use: No    Drug use: No    Sexual activity: Defer         ALLERGIES  Atorvastatin        REVIEW OF SYSTEMS  Review of Systems     All systems reviewed and negative except for those discussed in HPI.       PHYSICAL EXAM    I have reviewed the triage vital signs and nursing notes.    ED Triage Vitals [25]   Temp Heart Rate Resp BP SpO2    98.5 °F (36.9 °C) 67 24 (!) 203/119 90 %      Temp src Heart Rate Source Patient Position BP Location FiO2 (%)   Oral Monitor Sitting Left arm --       Physical Exam    General:  Awake, alert, elderly, visible increased work of breathing   HEENT: Atraumatic, normocephalic, EOMI, PERRLA, mucous membranes moist  NECK:  Supple, atraumatic  Cardiovascular: Tachycardic, regular rhythm, no murmurs, rubs, or gallops.  2+ pitting edema bilateral lower extremities  Respiratory: Rhonchi in lower lung fields bilaterally.  Mild tachypnea.  Abdominal:  Soft, nondistended, nontender.  No guarding or rebound.  No palpable masses  Extremity:  No visible bony abnormalities in all 4 extremities.  Full range of motion of all extremities.  Skin:  Warm and dry.  No rashes  Neuro:  AAOx3, GCS 15. Cranial nerves 2-12 grossly intact.  No focal strength or sensation deficits.  Psych:  Mood and affect appropriate.        LAB RESULTS  Recent Results (from the past 24 hours)   Comprehensive Metabolic Panel    Collection Time: 02/04/25  9:31 PM    Specimen: Blood   Result Value Ref Range    Glucose 154 (H) 65 - 99 mg/dL    BUN 15 8 - 23 mg/dL    Creatinine 1.36 (H) 0.76 - 1.27 mg/dL    Sodium 132 (L) 136 - 145 mmol/L    Potassium 4.5 3.5 - 5.2 mmol/L    Chloride 97 (L) 98 - 107 mmol/L    CO2 25.5 22.0 - 29.0 mmol/L    Calcium 9.6 8.6 - 10.5 mg/dL    Total Protein 7.1 6.0 - 8.5 g/dL    Albumin 4.2 3.5 - 5.2 g/dL    ALT (SGPT) 20 1 - 41 U/L    AST (SGOT) 34 1 - 40 U/L    Alkaline Phosphatase 48 39 - 117 U/L    Total Bilirubin 0.4 0.0 - 1.2 mg/dL    Globulin 2.9 gm/dL    A/G Ratio 1.4 g/dL    BUN/Creatinine Ratio 11.0 7.0 - 25.0    Anion Gap 9.5 5.0 - 15.0 mmol/L    eGFR 52.0 (L) >60.0 mL/min/1.73   BNP    Collection Time: 02/04/25  9:31 PM    Specimen: Blood   Result Value Ref Range    proBNP 14,205.0 (H) 0.0 - 1,800.0 pg/mL   High Sensitivity Troponin T    Collection Time: 02/04/25  9:31 PM    Specimen: Blood   Result Value Ref Range    HS  Troponin T 57 (C) <22 ng/L   CBC Auto Differential    Collection Time: 02/04/25  9:31 PM    Specimen: Blood   Result Value Ref Range    WBC 12.88 (H) 3.40 - 10.80 10*3/mm3    RBC 4.55 4.14 - 5.80 10*6/mm3    Hemoglobin 14.3 13.0 - 17.7 g/dL    Hematocrit 40.8 37.5 - 51.0 %    MCV 89.7 79.0 - 97.0 fL    MCH 31.4 26.6 - 33.0 pg    MCHC 35.0 31.5 - 35.7 g/dL    RDW 13.2 12.3 - 15.4 %    RDW-SD 43.8 37.0 - 54.0 fl    MPV 9.3 6.0 - 12.0 fL    Platelets 337 140 - 450 10*3/mm3    Neutrophil % 81.4 (H) 42.7 - 76.0 %    Lymphocyte % 8.2 (L) 19.6 - 45.3 %    Monocyte % 9.0 5.0 - 12.0 %    Eosinophil % 0.5 0.3 - 6.2 %    Basophil % 0.5 0.0 - 1.5 %    Immature Grans % 0.4 0.0 - 0.5 %    Neutrophils, Absolute 10.48 (H) 1.70 - 7.00 10*3/mm3    Lymphocytes, Absolute 1.06 0.70 - 3.10 10*3/mm3    Monocytes, Absolute 1.16 (H) 0.10 - 0.90 10*3/mm3    Eosinophils, Absolute 0.07 0.00 - 0.40 10*3/mm3    Basophils, Absolute 0.06 0.00 - 0.20 10*3/mm3    Immature Grans, Absolute 0.05 0.00 - 0.05 10*3/mm3    nRBC 0.0 0.0 - 0.2 /100 WBC   Hemoglobin A1c    Collection Time: 02/04/25  9:31 PM    Specimen: Blood   Result Value Ref Range    Hemoglobin A1C 5.80 (H) 4.80 - 5.60 %   COVID-19 and FLU A/B PCR, 1 HR TAT - Swab, Nasopharynx    Collection Time: 02/04/25  9:32 PM    Specimen: Nasopharynx; Swab   Result Value Ref Range    COVID19 Not Detected Not Detected - Ref. Range    Influenza A PCR Detected (A) Not Detected    Influenza B PCR Not Detected Not Detected   Blood Gas, Venous With Co-Ox    Collection Time: 02/04/25  9:57 PM    Specimen: Venous Blood   Result Value Ref Range    Site OTHER     pH, Venous 7.227 (C) 7.320 - 7.420 pH Units    pCO2, Venous 62.7 (H) 40.0 - 50.0 mm Hg    pO2, Venous 43.5 30.0 - 50.0 mm Hg    HCO3, Venous 26.0 22.0 - 28.0 mmol/L    Base Excess, Venous -2.9 (L) 0.0 - 2.0 mmol/L    O2 Saturation, Venous 68.6 45.0 - 75.0 %    Oxyhemoglobin Venous 67.5 40.0 - 70.0 %    Methemoglobin Venous 0.5 0.0 - 3.0 %     Carboxyhemoglobin Venous 1.0 0.0 - 5.0 %    Barometric Pressure for Blood Gas 740 mmHg    Modality Nasal Cannula     Flow Rate 6.0 lpm    Ventilator Mode NA     Notified Who MD     Notified By [id number]     Notified Time 02/04/2025 21:58    Lactic Acid, Plasma    Collection Time: 02/05/25 12:12 AM    Specimen: Blood   Result Value Ref Range    Lactate 1.3 0.5 - 2.0 mmol/L   High Sensitivity Troponin T 1Hr    Collection Time: 02/05/25 12:12 AM    Specimen: Blood   Result Value Ref Range    HS Troponin T 188 (C) <22 ng/L    Troponin T Numeric Delta 131 (C) Abnormal if >/=3 ng/L    Troponin T % Delta 230 (C) Abnormal if >/= 20%   Procalcitonin    Collection Time: 02/05/25 12:12 AM    Specimen: Blood   Result Value Ref Range    Procalcitonin 0.17 0.00 - 0.25 ng/mL   Urinalysis With Microscopic If Indicated (No Culture) - Urine, Clean Catch    Collection Time: 02/05/25 12:15 AM    Specimen: Urine, Clean Catch   Result Value Ref Range    Color, UA Yellow Yellow, Straw    Appearance, UA Clear Clear    pH, UA <=5.0 5.0 - 8.0    Specific Gravity, UA 1.014 1.005 - 1.030    Glucose, UA Negative Negative    Ketones, UA Negative Negative    Bilirubin, UA Negative Negative    Blood, UA Trace (A) Negative    Protein,  mg/dL (2+) (A) Negative    Leuk Esterase, UA Negative Negative    Nitrite, UA Negative Negative    Urobilinogen, UA 0.2 E.U./dL 0.2 - 1.0 E.U./dL   Urinalysis, Microscopic Only - Urine, Clean Catch    Collection Time: 02/05/25 12:15 AM    Specimen: Urine, Clean Catch   Result Value Ref Range    RBC, UA 0-2 None Seen, 0-2 /HPF    WBC, UA None Seen None Seen, 0-2 /HPF    Bacteria, UA None Seen None Seen /HPF    Squamous Epithelial Cells, UA 7-12 (A) None Seen, 0-2 /HPF    Hyaline Casts, UA None Seen None Seen /LPF    Methodology Manual Light Microscopy        If labs were ordered, I independently evaluated the results and considered them in treating the patient.        RADIOLOGY  CT Angiogram Chest Pulmonary  Embolism    Result Date: 2/4/2025  FINAL REPORT TECHNIQUE: null CLINICAL HISTORY: Hypoxia and tachycardia COMPARISON: null FINDINGS: CT angiography chest with contrast. 3D Postprocessing. Comparison: None Findings: Cardiomegaly. Moderate to severe coronary artery calcifications. Unremarkable thoracic aorta and great vessels. No aneurysm. No acute pulmonary embolus. The visualized thyroid and mediastinum are unremarkable. Large bilateral pleural effusions. Perihilar ground-glass opacities and septal thickening. The subsegmental pulmonary artery evaluation in the lower lungs is suboptimal due to breathing artifacts. 2.2 cm cyst segment 1 liver. Ankylosis of the thoracic spine. No acute fracture.     IMPRESSION: 1. No acute pulmonary embolus within limitations of the exam. 2. Findings suggesting heart failure. Authenticated and Electronically Signed by Elsa Higuera MD on 02/04/2025 11:05:01 PM     I ordered and independently evaluated the above noted radiographic studies.     See radiologist's dictation for official interpretation.        PROCEDURES    Critical Care    Performed by: Jesus Askew MD  Authorized by: Jesus Askew MD    Critical care provider statement:     Critical care time (minutes):  40    Critical care time was exclusive of:  Separately billable procedures and treating other patients and teaching time    Critical care was necessary to treat or prevent imminent or life-threatening deterioration of the following conditions:  Sepsis, respiratory failure and circulatory failure    Critical care was time spent personally by me on the following activities:  Obtaining history from patient or surrogate, examination of patient, discussions with consultants, development of treatment plan with patient or surrogate, ordering and performing treatments and interventions, ordering and review of laboratory studies, ordering and review of radiographic studies, pulse oximetry, re-evaluation of patient's  condition and review of old charts    I assumed direction of critical care for this patient from another provider in my specialty: no      Care discussed with: admitting provider        ECG 12 Lead Dyspnea   Final Result          MEDICATIONS GIVEN IN ER    Medications   sodium chloride 0.9 % flush 10 mL (has no administration in time range)   nitroglycerin (NITROSTAT) SL tablet 0.4 mg (0.4 mg Sublingual Given 2/4/25 2151)   Pharmacy to Dose Oseltamivir (TAMIFLU) (has no administration in time range)   oseltamivir (TAMIFLU) capsule 30 mg (has no administration in time range)   nitroglycerin (TRIDIL) 200 mcg/ml infusion (5 mcg/min Intravenous Restarted 2/5/25 0016)   amLODIPine (NORVASC) tablet 10 mg (has no administration in time range)   aspirin EC tablet 81 mg (has no administration in time range)   fenofibrate (TRICOR) tablet 145 mg (has no administration in time range)   lisinopril (PRINIVIL,ZESTRIL) tablet 40 mg (has no administration in time range)   sodium chloride 0.9 % flush 10 mL (10 mL Intravenous Given 2/5/25 0121)   sodium chloride 0.9 % flush 10 mL (has no administration in time range)   sodium chloride 0.9 % infusion 40 mL (has no administration in time range)   mupirocin (BACTROBAN) 2 % nasal ointment 1 Application (1 Application Each Nare Given 2/5/25 0121)   acetaminophen (TYLENOL) tablet 650 mg (has no administration in time range)     Or   acetaminophen (TYLENOL) 160 MG/5ML oral solution 650 mg (has no administration in time range)     Or   acetaminophen (TYLENOL) suppository 650 mg (has no administration in time range)   ondansetron (ZOFRAN) injection 4 mg (has no administration in time range)   Pharmacy to Dose enoxaparin (LOVENOX) (has no administration in time range)   sennosides-docusate (PERICOLACE) 8.6-50 MG per tablet 2 tablet (2 tablets Oral Not Given 2/5/25 0057)     And   polyethylene glycol (MIRALAX) packet 17 g (has no administration in time range)     And   bisacodyl (DULCOLAX) EC  tablet 5 mg (has no administration in time range)     And   bisacodyl (DULCOLAX) suppository 10 mg (has no administration in time range)   guaiFENesin-dextromethorphan (ROBITUSSIN DM) 100-10 MG/5ML syrup 5 mL (has no administration in time range)   benzonatate (TESSALON) capsule 100 mg (has no administration in time range)   ipratropium-albuterol (DUO-NEB) nebulizer solution 3 mL (has no administration in time range)   dextrose (GLUTOSE) oral gel 15 g (has no administration in time range)   dextrose (D50W) (25 g/50 mL) IV injection 25 g (has no administration in time range)   glucagon (GLUCAGEN) injection 1 mg (has no administration in time range)   Insulin Lispro (humaLOG) injection 2-7 Units (has no administration in time range)   Enoxaparin Sodium (LOVENOX) syringe 40 mg (40 mg Subcutaneous Given 2/5/25 0121)   furosemide (LASIX) injection 80 mg (80 mg Intravenous Given 2/4/25 2150)   oseltamivir (TAMIFLU) capsule 75 mg (75 mg Oral Given 2/5/25 0119)   iopamidol (ISOVUE-300) 61 % injection 100 mL (100 mL Intravenous Given 2/4/25 2229)         MEDICAL DECISION MAKING, PROGRESS, and CONSULTS    All labs, if obtained, have been independently reviewed by me.  All radiology studies, if obtained, have been evaluated by me and the radiologist dictating the report.  All EKG's, if obtained, have been independently viewed and interpreted by me.      Discussion below represents my analysis of pertinent findings related to patient's condition, differential diagnosis, treatment plan and final disposition.    Abebe Crockett is a 84 y.o. male who presents to the ED c/o difficulty breathing.  Significantly hypertensive on arrival with blood pressure of 203/119 along with tachycardic and hypoxic.  Mentating appropriately with GCS of 15.  Satting 73% on room air.  Placed on nasal cannula oxygen.  Differential includes but is not limited to new onset CHF, hypertensive emergency, pulmonary edema, pneumonia, pulmonary embolism,  arrhythmia.  Extensive labs ordered along with chest x-ray and EKG.    EKG personally interpreted showing sinus tachycardia with rate of 132 with frequent PVCs.    Chest x-ray obtained which was personally visualized and interpreted showing diffuse opacifications worse in right lung than left, consistent with CHF versus atypical pneumonia.  Patient given 80 mg of IV Lasix for diuresis.  Started on nitroglycerin drip due to severe hypertension and pulmonary edema.    Labs show leukocytosis of 12.88, along with mildly elevated creatinine of 1.36.  No critical electrolyte imbalances.  Influenza A positive.  VBG obtained which showed respiratory acidosis with pH of 7.227 and pCO2 of 62.7.  Patient placed on BiPAP treatment.    CTA chest negative for pulmonary embolism but does show evidence of moderate pleural effusions bilaterally, right greater than left, along with significant evidence of CHF/pulmonary edema.    Patient feeling better on BiPAP.  Blood pressure significantly improving on nitroglycerin drip and it was able to be turned off.  Extensively discussed all results with patient and wife at bedside.  They voiced understanding and were agreeable with plan for admission.  Discussed patient case with hospitalist who admitted to the ICU for further treatment.                             Orders placed during this visit:  Orders Placed This Encounter   Procedures    Critical Care    COVID-19 and FLU A/B PCR, 1 HR TAT - Swab, Nasopharynx    Blood Culture - Blood,    Blood Culture - Blood,    XR Chest 1 View    CT Angiogram Chest Pulmonary Embolism    Urinalysis With Microscopic If Indicated (No Culture) - Urine, Clean Catch    Comprehensive Metabolic Panel    BNP    High Sensitivity Troponin T    CBC Auto Differential    Lactic Acid, Plasma    Blood Gas, Venous With Co-Ox    High Sensitivity Troponin T 1Hr    Procalcitonin    Hemoglobin A1c    Basic Metabolic Panel    CBC (No Diff)    Lipid Panel    TSH     Urinalysis, Microscopic Only - Urine, Clean Catch    Diet: Cardiac, Diabetic; Healthy Heart (2-3 Na+); Consistent Carbohydrate; Fluid Consistency: Thin (IDDSI 0)    Vital Signs    Up With Assistance    Intake & Output    Weigh patient    Oral Care    Saline Lock & Maintain IV Access    Daily CHG Bath While in ICU    Continuous Pulse Oximetry    Code Status and Medical Interventions: CPR (Attempt to Resuscitate); Full Support    Inpatient Cardiology Consult    NIPPV (CPAP or BIPAP)    Incentive Spirometry    POC Glucose 4x Daily Before Meals & at Bedtime    ECG 12 Lead Dyspnea    Adult Transthoracic Echo Complete W/ Cont if Necessary Per Protocol    Insert Peripheral IV    Insert Peripheral IV    Inpatient Admission    Fall Precautions    CBC & Differential         ED Course:    Consultants: Hospitalist, Dr. Posada                Shared Decision Making:  After my consideration of clinical presentation and any laboratory/radiology studies obtained, I discussed the findings with the patient/patient representative who is in agreement with the treatment plan and the final disposition.   Risks and benefits of discharge and/or observation/admission were discussed.      AS OF 03:50 EST VITALS:    BP - 114/69  HR - 95  TEMP - 98.5 °F (36.9 °C) (Oral)  O2 SATS - 91%                  DIAGNOSIS  Final diagnoses:   Acute respiratory failure with hypoxia and hypercapnia   Acute congestive heart failure, unspecified heart failure type   Hypertensive emergency   Influenza A   Bilateral pleural effusion         DISPOSITION  Admit      Please note that portions of this document were completed with voice recognition software.        Jesus Askew MD  02/05/25 1490

## 2025-02-06 ENCOUNTER — READMISSION MANAGEMENT (OUTPATIENT)
Dept: CALL CENTER | Facility: HOSPITAL | Age: 85
End: 2025-02-06
Payer: MEDICARE

## 2025-02-06 VITALS
SYSTOLIC BLOOD PRESSURE: 104 MMHG | OXYGEN SATURATION: 95 % | DIASTOLIC BLOOD PRESSURE: 76 MMHG | BODY MASS INDEX: 27.14 KG/M2 | WEIGHT: 162.92 LBS | HEART RATE: 63 BPM | RESPIRATION RATE: 18 BRPM | TEMPERATURE: 98.2 F | HEIGHT: 65 IN

## 2025-02-06 PROBLEM — I50.21 ACUTE SYSTOLIC HEART FAILURE: Status: ACTIVE | Noted: 2025-02-06

## 2025-02-06 LAB
ANION GAP SERPL CALCULATED.3IONS-SCNC: 10.6 MMOL/L (ref 5–15)
BUN SERPL-MCNC: 26 MG/DL (ref 8–23)
BUN/CREAT SERPL: 15.4 (ref 7–25)
CALCIUM SPEC-SCNC: 8.5 MG/DL (ref 8.6–10.5)
CHLORIDE SERPL-SCNC: 94 MMOL/L (ref 98–107)
CO2 SERPL-SCNC: 27.4 MMOL/L (ref 22–29)
CREAT SERPL-MCNC: 1.69 MG/DL (ref 0.76–1.27)
DEPRECATED RDW RBC AUTO: 42.2 FL (ref 37–54)
EGFRCR SERPLBLD CKD-EPI 2021: 40 ML/MIN/1.73
ERYTHROCYTE [DISTWIDTH] IN BLOOD BY AUTOMATED COUNT: 13.2 % (ref 12.3–15.4)
GLUCOSE BLDC GLUCOMTR-MCNC: 104 MG/DL (ref 70–130)
GLUCOSE BLDC GLUCOMTR-MCNC: 203 MG/DL (ref 70–130)
GLUCOSE SERPL-MCNC: 108 MG/DL (ref 65–99)
HCT VFR BLD AUTO: 33.1 % (ref 37.5–51)
HGB BLD-MCNC: 11.7 G/DL (ref 13–17.7)
MCH RBC QN AUTO: 30.9 PG (ref 26.6–33)
MCHC RBC AUTO-ENTMCNC: 35.3 G/DL (ref 31.5–35.7)
MCV RBC AUTO: 87.3 FL (ref 79–97)
PLATELET # BLD AUTO: 241 10*3/MM3 (ref 140–450)
PMV BLD AUTO: 9.4 FL (ref 6–12)
POTASSIUM SERPL-SCNC: 3.6 MMOL/L (ref 3.5–5.2)
RBC # BLD AUTO: 3.79 10*6/MM3 (ref 4.14–5.8)
SODIUM SERPL-SCNC: 132 MMOL/L (ref 136–145)
WBC NRBC COR # BLD AUTO: 6.28 10*3/MM3 (ref 3.4–10.8)

## 2025-02-06 PROCEDURE — 94799 UNLISTED PULMONARY SVC/PX: CPT

## 2025-02-06 PROCEDURE — 82948 REAGENT STRIP/BLOOD GLUCOSE: CPT

## 2025-02-06 PROCEDURE — 25010000002 FUROSEMIDE PER 20 MG: Performed by: INTERNAL MEDICINE

## 2025-02-06 PROCEDURE — 99239 HOSP IP/OBS DSCHRG MGMT >30: CPT | Performed by: INTERNAL MEDICINE

## 2025-02-06 PROCEDURE — 85027 COMPLETE CBC AUTOMATED: CPT | Performed by: INTERNAL MEDICINE

## 2025-02-06 PROCEDURE — 94660 CPAP INITIATION&MGMT: CPT

## 2025-02-06 PROCEDURE — 82948 REAGENT STRIP/BLOOD GLUCOSE: CPT | Performed by: INTERNAL MEDICINE

## 2025-02-06 PROCEDURE — 80048 BASIC METABOLIC PNL TOTAL CA: CPT | Performed by: INTERNAL MEDICINE

## 2025-02-06 RX ORDER — OSELTAMIVIR PHOSPHATE 30 MG/1
30 CAPSULE ORAL EVERY 12 HOURS SCHEDULED
Qty: 6 CAPSULE | Refills: 0 | Status: SHIPPED | OUTPATIENT
Start: 2025-02-06 | End: 2025-02-17 | Stop reason: HOSPADM

## 2025-02-06 RX ORDER — CARVEDILOL 6.25 MG/1
6.25 TABLET ORAL 2 TIMES DAILY WITH MEALS
Qty: 60 TABLET | Refills: 0 | Status: SHIPPED | OUTPATIENT
Start: 2025-02-06 | End: 2025-02-17 | Stop reason: HOSPADM

## 2025-02-06 RX ADMIN — ASPIRIN 81 MG: 81 TABLET, COATED ORAL at 10:47

## 2025-02-06 RX ADMIN — LISINOPRIL 40 MG: 20 TABLET ORAL at 10:47

## 2025-02-06 RX ADMIN — CARVEDILOL 6.25 MG: 6.25 TABLET, FILM COATED ORAL at 10:47

## 2025-02-06 RX ADMIN — FENOFIBRATE 145 MG: 145 TABLET, FILM COATED ORAL at 10:47

## 2025-02-06 RX ADMIN — MUPIROCIN 1 APPLICATION: 20 OINTMENT TOPICAL at 10:47

## 2025-02-06 RX ADMIN — FUROSEMIDE 40 MG: 10 INJECTION, SOLUTION INTRAMUSCULAR; INTRAVENOUS at 05:41

## 2025-02-06 RX ADMIN — Medication 10 ML: at 10:47

## 2025-02-06 RX ADMIN — OSELTAMIVIR PHOSPHATE 30 MG: 30 CAPSULE ORAL at 10:47

## 2025-02-06 NOTE — DISCHARGE SUMMARY
[facility]   DISCHARGE SUMMARY      Name:  Abebe Crockett   Age:  84 y.o.  Sex:  male  :  1940  MRN:  [id number]   Visit Number:  [id number]    Admission Date:  2025  Date of Discharge:  2025  Primary Care Physician:  Dre Henry MD      Discharge Diagnoses:     Hypertensive emergency,   Acute systolic heart failure   Acute pulmonary edema secondary to #1  Acute hypoxic respiratory failure secondary to #3  Acute influenza A bronchitis  Demand ischemia with elevated troponin secondary to #1  Chronic kidney disease stage III.  Essential hypertension.  Diabetes mellitus type 2.       Problem List:     Active Hospital Problems    Diagnosis  POA    **Acute systolic heart failure [I50.21]  Unknown    Influenza A [J10.1]  Yes    Acute respiratory failure with hypoxia [J96.01]  Yes    Acute pulmonary edema [J81.0]  Yes    Hypertensive emergency [I16.1]  Yes      Resolved Hospital Problems   No resolved problems to display.     Presenting Problem:    Chief Complaint   Patient presents with    Shortness of Breath      Consults:     Consulting Physician(s)         Provider   Role Specialty     Adeel Myles MD      Consulting Physician Cardiology          Procedures Performed:        History of presenting illness/Hospital Course:    Abebe Crockett is an 84-year-old male with history of hypertension, diabetes mellitus type 2, sleep apnea was brought to the emergency room by his wife with symptoms of shortness of breath.  In the ED, he was afebrile with initial pulse of 67 and a blood pressure of 203/119.  Patient subsequently developed tachycardia in the 130s and also dropped his saturation to 73% on room air.  He was placed on 6 L of nasal cannula oxygen with improvement in his saturations to 90%.  Chest x-ray showed evidence of bilateral pulmonary edema.  EKG showed sinus tachycardia with lateral wall ST-T changes.  Initial troponin 57.  proBNP 14,205.  CMP revealed a  sodium of 132, creatinine 1.36 (baseline), glucose 154.  LFT was unremarkable.  CBC was unremarkable except for a white count of 12.88.  COVID and flu test showed positive for influenza A.  Blood cultures were obtained.  Chest x-ray showed evidence of pulmonary edema bilaterally.  Patient was given 80 mg of IV Lasix, Tamiflu in the emergency room and was subsequently admitted to the medical ICU for management of hypertensive urgency, acute pulmonary edema, acute influenza A bronchitis.     Hypertensive emergency/pulmonary edema.  Acute exacerbation of systolic/diastolic heart  - Patient weaned off nitroglycerin drip  - Diuresed well with IV Lasix  - Restarted home medications including lisinopril.  - Echocardiogram revealed EF 30 to 35% with grade 2 diastolic dysfunction.  Differential includes possible viral myocarditis.  - Dr. Myles, cardiology consulted  -Discontinue amlodipine.  Start Coreg and Jardiance for goal-directed medical therapy.  Plan for initiation of Entresto as an outpatient.  -Follow-up with cardiology     Acute influenza A bronchitis/respiratory failure.  - Hypoxia resolved and patient stable on room air at discharge  - Continue oseltamivir.     Elevated troponin.  - Likely secondary to hypertensive emergency in the setting of chronic kidney disease.  - Do not suspect acute coronary syndrome.  - Continue home dose of aspirin.    Patient discharged home with follow-up arranged with primary physician as well as with cardiology.    Vital Signs:    Temp:  [97.5 °F (36.4 °C)-100.4 °F (38 °C)] 98.2 °F (36.8 °C)  Heart Rate:  [] 107  Resp:  [16-22] 17  BP: ()/() 139/82    Physical Exam:    General Appearance:  Alert and cooperative.    Head:  Atraumatic and normocephalic.   Eyes: Conjunctivae and sclerae normal, no icterus. No pallor.   Ears:  Ears with no abnormalities noted.   Throat: No oral lesions, no thrush, oral mucosa moist.   Neck: Supple, trachea midline, no thyromegaly.    Back:   No kyphoscoliosis present. No tenderness to palpation.   Lungs:   Nonlabored breathing.  No crackles or wheezing.    Heart:  Normal S1 and S2, no murmur, no gallop, no rub. No JVD.   Abdomen:   Normal bowel sounds, no masses, no organomegaly. Soft, nontender, nondistended, no rebound tenderness.   Extremities: Supple, no edema, no cyanosis, no clubbing.   Pulses: Pulses palpable bilaterally.   Skin: No bleeding or rash.   Neurologic: Alert and oriented x 3. No facial asymmetry. Moves all four limbs. No tremors.     Pertinent Lab Results:     Results from last 7 days   Lab Units 02/06/25 0443 02/05/25  0536 02/04/25  2131   SODIUM mmol/L 132* 131* 132*   POTASSIUM mmol/L 3.6 4.1 4.5   CHLORIDE mmol/L 94* 96* 97*   CO2 mmol/L 27.4 24.9 25.5   BUN mg/dL 26* 21 15   CREATININE mg/dL 1.69* 1.35* 1.36*   CALCIUM mg/dL 8.5* 8.5* 9.6   BILIRUBIN mg/dL  --   --  0.4   ALK PHOS U/L  --   --  48   ALT (SGPT) U/L  --   --  20   AST (SGOT) U/L  --   --  34   GLUCOSE mg/dL 108* 132* 154*     Results from last 7 days   Lab Units 02/06/25 0443 02/05/25  0536 02/04/25  2131   WBC 10*3/mm3 6.28 9.98 12.88*   HEMOGLOBIN g/dL 11.7* 12.0* 14.3   HEMATOCRIT % 33.1* 34.3* 40.8   PLATELETS 10*3/mm3 241 262 337         Results from last 7 days   Lab Units 02/05/25  0012 02/04/25  2131   HSTROP T ng/L 188* 57*     Results from last 7 days   Lab Units 02/04/25  2131   PROBNP pg/mL 14,205.0*                 Results from last 7 days   Lab Units 02/04/25  2155 02/04/25 2145   BLOODCX  No growth at 24 hours No growth at 24 hours       Pertinent Radiology Results:    Imaging Results (All)       Procedure Component Value Units Date/Time    XR Chest 1 View [[id number]] Collected: 02/05/25 1147     Updated: 02/05/25 1202    Narrative:      PROCEDURE: XR CHEST 1 VW-        HISTORY: Shortness of breath/flulike illness     COMPARISON: None.     FINDINGS: The heart is borderline in size. There is pulmonary vascular  congestion. Moderate  bilateral interstitial disease is seen with small  bilateral pleural effusions. There is bibasilar atelectasis or  infiltrate. Airspace disease is seen in the right upper lobe. There is  no pneumothorax. There are no acute osseous abnormalities.       Impression:      Findings concerning for CHF/edema with superimposed  pneumonia. Continued follow-up is recommended.              Images were reviewed, interpreted, and dictated by Dr. Erinn Mcclelland MD  Transcribed by Hortencia Smart PA-C.     This report was signed and finalized on 2/5/2025 12:00 PM by Erinn Mcclelland MD.       CT Angiogram Chest Pulmonary Embolism [[id number]] Collected: 02/04/25 2305     Updated: 02/04/25 2306    Narrative:      FINAL REPORT    TECHNIQUE:  null    CLINICAL HISTORY:  Hypoxia and tachycardia    COMPARISON:  null    FINDINGS:  CT angiography chest with contrast. 3D Postprocessing.    Comparison: None    Findings:    Cardiomegaly. Moderate to severe coronary artery calcifications.    Unremarkable thoracic aorta and great vessels. No aneurysm.    No acute pulmonary embolus.    The visualized thyroid and mediastinum are unremarkable.    Large bilateral pleural effusions.    Perihilar ground-glass opacities and septal thickening.    The subsegmental pulmonary artery evaluation in the lower lungs is suboptimal due to breathing artifacts.    2.2 cm cyst segment 1 liver.    Ankylosis of the thoracic spine. No acute fracture.      Impression:      IMPRESSION:    1. No acute pulmonary embolus within limitations of the exam.    2. Findings suggesting heart failure.    Authenticated and Electronically Signed by Elsa Higuera MD  on 02/04/2025 11:05:01 PM            Echo:    Results for orders placed during the hospital encounter of 02/04/25    Adult Transthoracic Echo Complete W/ Cont if Necessary Per Protocol    Interpretation Summary  1.  Mild left ventricular dilation with moderately reduced LV systolic function, LVEF 30-35%.  2.  Moderate  global LV hypokinesis.  3.  Grade 2 diastolic dysfunction.  4.  Normal right ventricular size and systolic function by TAPSE.  5.  Severely increased left atrial volume index.  6.  Mild tricuspid regurgitation.  7.  Left pleural effusion.    Condition on Discharge:      Stable.    Code status during the hospital stay:    Code Status and Medical Interventions: CPR (Attempt to Resuscitate); Full Support   Ordered at: 02/05/25 0010     Code Status (Patient has no pulse and is not breathing):    CPR (Attempt to Resuscitate)     Medical Interventions (Patient has pulse or is breathing):    Full Support     Discharge Disposition:    Home or Self Care    Discharge Medications:       Discharge Medications        New Medications        Instructions Start Date   carvedilol 6.25 MG tablet  Commonly known as: COREG   6.25 mg, Oral, 2 Times Daily With Meals      empagliflozin 10 MG tablet tablet  Commonly known as: Jardiance   10 mg, Oral, Daily      oseltamivir 30 MG capsule  Commonly known as: TAMIFLU   30 mg, Oral, Every 12 Hours Scheduled             Continue These Medications        Instructions Start Date   ASPIRIN 81 PO   Take  by mouth.      clotrimazole-betamethasone 1-0.05 % cream  Commonly known as: LOTRISONE       fenofibrate 145 MG tablet  Commonly known as: TRICOR   No dose, route, or frequency recorded.      lisinopril 40 MG tablet  Commonly known as: PRINIVIL,ZESTRIL   Take 1 tablet by mouth once daily      metFORMIN 500 MG tablet  Commonly known as: GLUCOPHAGE   Take  by mouth.      vitamin D3 125 MCG (5000 UT) capsule capsule   5,000 Units, Daily             Stop These Medications      amLODIPine 10 MG tablet  Commonly known as: NORVASC     NON FORMULARY     NON FORMULARY     NON FORMULARY            Discharge Diet:     Diet Instructions       Diet: Cardiac Diets, Diabetic Diets; Healthy Heart (2-3 Na+); Regular (IDDSI 7); Thin (IDDSI 0); Consistent Carbohydrate      Discharge Diet:  Cardiac Diets  Diabetic  Diets       Cardiac Diet: Healthy Heart (2-3 Na+)    Texture: Regular (IDDSI 7)    Fluid Consistency: Thin (IDDSI 0)    Diabetic Diet: Consistent Carbohydrate          Activity at Discharge:     Activity Instructions       Activity as Tolerated            Follow-up Appointments:    Additional Instructions for the Follow-ups that You Need to Schedule       Ambulatory Referral to Disease State Management   As directed      To dept:  [facility] [[id number]]   What program(s) are you referring for?: Heart Failure   Follow-up needed: Yes        Discharge Follow-up with PCP   As directed       Currently Documented PCP:    Dre Henry MD    PCP Phone Number:    [phone]     Follow Up Details: 1 week        Discharge Follow-up with Specified Provider: Dr Myles; 6 Weeks   As directed      To: Dr Myles   Follow Up: 6 Weeks               Follow-up Information       Dre Henry MD .    Specialty: Family Medicine  Why: 1 week  Contact information:  [address] [address]  [phone]                           Future Appointments   Date Time Provider Department Center   12/4/2025 11:00 AM Adeel Myles MD [facility]     Test Results Pending at Discharge:    Pending Results       Procedure [Order ID] Specimen - Date/Time    Respiratory Culture - Sputum, Cough [[id number]]     Specimen: Sputum from Cough                  David Aguilar DO  02/06/25  12:14 EST    Time: I spent >30 minutes on this discharge activity which included: face-to-face encounter with the patient, reviewing the data in the system, coordination of the care with the nursing staff as well as consultants, documentation, and entering orders.     Dictated utilizing Dragon dictation.

## 2025-02-06 NOTE — PLAN OF CARE
Goal Outcome Evaluation:  Plan of Care Reviewed With: patient        Progress: improving  Outcome Evaluation: Titrating nitro drip down with improving bloodpressures.  Care plan discussed with pt and his wife.

## 2025-02-06 NOTE — CASE MANAGEMENT/SOCIAL WORK
Case Management Discharge Note      Final Note: Pt discharged home with family via private car. No needs at discharge.         Selected Continued Care - Discharged on 2/6/2025 Admission date: 2/4/2025 - Discharge disposition: Home or Self Care      Destination    No services have been selected for the patient.                Durable Medical Equipment    No services have been selected for the patient.                Dialysis/Infusion    No services have been selected for the patient.                Home Medical Care    No services have been selected for the patient.                Therapy    No services have been selected for the patient.                Community Resources    No services have been selected for the patient.                Community & DME    No services have been selected for the patient.                    Transportation Services  Private: Car    Final Discharge Disposition Code: 01 - home or self-care

## 2025-02-07 NOTE — OUTREACH NOTE
Prep Survey      Flowsheet Row Responses   [facility] patient discharged from? [facility]   Is LACE score < 7 ? No   Eligibility Readm Mgmt   Discharge diagnosis Acute systolic heart failure   Does the patient have one of the following disease processes/diagnoses(primary or secondary)? CHF   Does the patient have Home health ordered? No   Is there a DME ordered? No   Prep survey completed? Yes            MANPREET MOSQUERA - Registered Nurse

## 2025-02-09 ENCOUNTER — APPOINTMENT (OUTPATIENT)
Dept: GENERAL RADIOLOGY | Facility: HOSPITAL | Age: 85
DRG: 280 | End: 2025-02-09
Payer: MEDICARE

## 2025-02-09 ENCOUNTER — READMISSION MANAGEMENT (OUTPATIENT)
Dept: CALL CENTER | Facility: HOSPITAL | Age: 85
End: 2025-02-09
Payer: MEDICARE

## 2025-02-09 ENCOUNTER — HOSPITAL ENCOUNTER (INPATIENT)
Facility: HOSPITAL | Age: 85
LOS: 7 days | Discharge: HOME | DRG: 280 | End: 2025-02-17
Attending: EMERGENCY MEDICINE | Admitting: FAMILY MEDICINE
Payer: MEDICARE

## 2025-02-09 DIAGNOSIS — R79.89 ELEVATED TROPONIN: ICD-10-CM

## 2025-02-09 DIAGNOSIS — J10.1 INFLUENZA A: Primary | ICD-10-CM

## 2025-02-09 DIAGNOSIS — I50.21 ACUTE SYSTOLIC HEART FAILURE: ICD-10-CM

## 2025-02-09 DIAGNOSIS — I21.4 NSTEMI (NON-ST ELEVATED MYOCARDIAL INFARCTION): ICD-10-CM

## 2025-02-09 LAB
ALBUMIN SERPL-MCNC: 3.5 G/DL (ref 3.5–5.2)
ALBUMIN/GLOB SERPL: 1.3 G/DL
ALP SERPL-CCNC: 39 U/L (ref 39–117)
ALT SERPL W P-5'-P-CCNC: 25 U/L (ref 1–41)
ANION GAP SERPL CALCULATED.3IONS-SCNC: 8.9 MMOL/L (ref 5–15)
APTT PPP: 29.1 SECONDS (ref 70–100)
AST SERPL-CCNC: 42 U/L (ref 1–40)
B PARAPERT DNA SPEC QL NAA+PROBE: NOT DETECTED
B PERT DNA SPEC QL NAA+PROBE: NOT DETECTED
BACTERIA SPEC AEROBE CULT: NORMAL
BACTERIA SPEC AEROBE CULT: NORMAL
BASOPHILS # BLD AUTO: 0.02 10*3/MM3 (ref 0–0.2)
BASOPHILS NFR BLD AUTO: 0.5 % (ref 0–1.5)
BILIRUB SERPL-MCNC: 0.4 MG/DL (ref 0–1.2)
BUN SERPL-MCNC: 28 MG/DL (ref 8–23)
BUN/CREAT SERPL: 19 (ref 7–25)
C PNEUM DNA NPH QL NAA+NON-PROBE: NOT DETECTED
CALCIUM SPEC-SCNC: 9 MG/DL (ref 8.6–10.5)
CHLORIDE SERPL-SCNC: 100 MMOL/L (ref 98–107)
CHOLEST SERPL-MCNC: 189 MG/DL (ref 0–200)
CO2 SERPL-SCNC: 26.1 MMOL/L (ref 22–29)
CREAT SERPL-MCNC: 1.47 MG/DL (ref 0.76–1.27)
DEPRECATED RDW RBC AUTO: 42.9 FL (ref 37–54)
EGFRCR SERPLBLD CKD-EPI 2021: 47.3 ML/MIN/1.73
EOSINOPHIL # BLD AUTO: 0.07 10*3/MM3 (ref 0–0.4)
EOSINOPHIL NFR BLD AUTO: 1.8 % (ref 0.3–6.2)
ERYTHROCYTE [DISTWIDTH] IN BLOOD BY AUTOMATED COUNT: 13.2 % (ref 12.3–15.4)
FLUAV H1 2009 PAND RNA NPH QL NAA+PROBE: DETECTED
FLUBV RNA ISLT QL NAA+PROBE: NOT DETECTED
GEN 5 1HR TROPONIN T REFLEX: 787 NG/L
GLOBULIN UR ELPH-MCNC: 2.8 GM/DL
GLUCOSE BLDC GLUCOMTR-MCNC: 196 MG/DL (ref 70–130)
GLUCOSE SERPL-MCNC: 195 MG/DL (ref 65–99)
HADV DNA SPEC NAA+PROBE: NOT DETECTED
HBA1C MFR BLD: 6.1 % (ref 4.8–5.6)
HCOV 229E RNA SPEC QL NAA+PROBE: NOT DETECTED
HCOV HKU1 RNA SPEC QL NAA+PROBE: NOT DETECTED
HCOV NL63 RNA SPEC QL NAA+PROBE: NOT DETECTED
HCOV OC43 RNA SPEC QL NAA+PROBE: NOT DETECTED
HCT VFR BLD AUTO: 37.6 % (ref 37.5–51)
HDLC SERPL-MCNC: 27 MG/DL (ref 40–60)
HGB BLD-MCNC: 12.9 G/DL (ref 13–17.7)
HMPV RNA NPH QL NAA+NON-PROBE: NOT DETECTED
HOLD SPECIMEN: NORMAL
HOLD SPECIMEN: NORMAL
HPIV1 RNA ISLT QL NAA+PROBE: NOT DETECTED
HPIV2 RNA SPEC QL NAA+PROBE: NOT DETECTED
HPIV3 RNA NPH QL NAA+PROBE: NOT DETECTED
HPIV4 P GENE NPH QL NAA+PROBE: NOT DETECTED
IMM GRANULOCYTES # BLD AUTO: 0.03 10*3/MM3 (ref 0–0.05)
IMM GRANULOCYTES NFR BLD AUTO: 0.8 % (ref 0–0.5)
INR PPP: 1.04 (ref 0.9–1.1)
LDLC SERPL CALC-MCNC: 134 MG/DL (ref 0–100)
LDLC/HDLC SERPL: 4.85 {RATIO}
LYMPHOCYTES # BLD AUTO: 0.67 10*3/MM3 (ref 0.7–3.1)
LYMPHOCYTES NFR BLD AUTO: 17.5 % (ref 19.6–45.3)
M PNEUMO IGG SER IA-ACNC: NOT DETECTED
MCH RBC QN AUTO: 30.6 PG (ref 26.6–33)
MCHC RBC AUTO-ENTMCNC: 34.3 G/DL (ref 31.5–35.7)
MCV RBC AUTO: 89.1 FL (ref 79–97)
MONOCYTES # BLD AUTO: 0.47 10*3/MM3 (ref 0.1–0.9)
MONOCYTES NFR BLD AUTO: 12.3 % (ref 5–12)
NEUTROPHILS NFR BLD AUTO: 2.56 10*3/MM3 (ref 1.7–7)
NEUTROPHILS NFR BLD AUTO: 67.1 % (ref 42.7–76)
NRBC BLD AUTO-RTO: 0 /100 WBC (ref 0–0.2)
NT-PROBNP SERPL-MCNC: ABNORMAL PG/ML (ref 0–1800)
PLATELET # BLD AUTO: 279 10*3/MM3 (ref 140–450)
PMV BLD AUTO: 9.8 FL (ref 6–12)
POTASSIUM SERPL-SCNC: 4.3 MMOL/L (ref 3.5–5.2)
PROT SERPL-MCNC: 6.3 G/DL (ref 6–8.5)
PROTHROMBIN TIME: 14.1 SECONDS (ref 12.3–15.1)
RBC # BLD AUTO: 4.22 10*6/MM3 (ref 4.14–5.8)
RHINOVIRUS RNA SPEC NAA+PROBE: NOT DETECTED
RSV RNA NPH QL NAA+NON-PROBE: NOT DETECTED
SARS-COV-2 RNA NPH QL NAA+NON-PROBE: NOT DETECTED
SODIUM SERPL-SCNC: 135 MMOL/L (ref 136–145)
TRIGL SERPL-MCNC: 155 MG/DL (ref 0–150)
TROPONIN T % DELTA: 2
TROPONIN T NUMERIC DELTA: 19 NG/L
TROPONIN T SERPL HS-MCNC: 689 NG/L
TROPONIN T SERPL HS-MCNC: 768 NG/L
TSH SERPL DL<=0.05 MIU/L-ACNC: 2.85 UIU/ML (ref 0.27–4.2)
UFH PPP CHRO-ACNC: 0.1 IU/ML (ref 0.3–0.7)
UFH PPP CHRO-ACNC: 0.51 IU/ML (ref 0.3–0.7)
VLDLC SERPL-MCNC: 28 MG/DL (ref 5–40)
WBC NRBC COR # BLD AUTO: 3.82 10*3/MM3 (ref 3.4–10.8)
WHOLE BLOOD HOLD COAG: NORMAL
WHOLE BLOOD HOLD SPECIMEN: NORMAL

## 2025-02-09 PROCEDURE — 84443 ASSAY THYROID STIM HORMONE: CPT | Performed by: FAMILY MEDICINE

## 2025-02-09 PROCEDURE — G0378 HOSPITAL OBSERVATION PER HR: HCPCS

## 2025-02-09 PROCEDURE — 99223 1ST HOSP IP/OBS HIGH 75: CPT | Performed by: FAMILY MEDICINE

## 2025-02-09 PROCEDURE — 82948 REAGENT STRIP/BLOOD GLUCOSE: CPT

## 2025-02-09 PROCEDURE — 84484 ASSAY OF TROPONIN QUANT: CPT | Performed by: EMERGENCY MEDICINE

## 2025-02-09 PROCEDURE — 94799 UNLISTED PULMONARY SVC/PX: CPT

## 2025-02-09 PROCEDURE — 85610 PROTHROMBIN TIME: CPT | Performed by: FAMILY MEDICINE

## 2025-02-09 PROCEDURE — 36415 COLL VENOUS BLD VENIPUNCTURE: CPT

## 2025-02-09 PROCEDURE — 99214 OFFICE O/P EST MOD 30 MIN: CPT | Performed by: INTERNAL MEDICINE

## 2025-02-09 PROCEDURE — 94660 CPAP INITIATION&MGMT: CPT

## 2025-02-09 PROCEDURE — 85520 HEPARIN ASSAY: CPT | Performed by: FAMILY MEDICINE

## 2025-02-09 PROCEDURE — 85025 COMPLETE CBC W/AUTO DIFF WBC: CPT | Performed by: EMERGENCY MEDICINE

## 2025-02-09 PROCEDURE — 93005 ELECTROCARDIOGRAM TRACING: CPT | Performed by: FAMILY MEDICINE

## 2025-02-09 PROCEDURE — 93005 ELECTROCARDIOGRAM TRACING: CPT | Performed by: EMERGENCY MEDICINE

## 2025-02-09 PROCEDURE — 71045 X-RAY EXAM CHEST 1 VIEW: CPT

## 2025-02-09 PROCEDURE — 83036 HEMOGLOBIN GLYCOSYLATED A1C: CPT | Performed by: FAMILY MEDICINE

## 2025-02-09 PROCEDURE — 25010000002 FUROSEMIDE PER 20 MG: Performed by: FAMILY MEDICINE

## 2025-02-09 PROCEDURE — 25010000002 HEPARIN (PORCINE) PER 1000 UNITS: Performed by: FAMILY MEDICINE

## 2025-02-09 PROCEDURE — 80053 COMPREHEN METABOLIC PANEL: CPT | Performed by: EMERGENCY MEDICINE

## 2025-02-09 PROCEDURE — 84484 ASSAY OF TROPONIN QUANT: CPT | Performed by: FAMILY MEDICINE

## 2025-02-09 PROCEDURE — 25010000002 MORPHINE PER 10 MG: Performed by: FAMILY MEDICINE

## 2025-02-09 PROCEDURE — 99285 EMERGENCY DEPT VISIT HI MDM: CPT | Performed by: EMERGENCY MEDICINE

## 2025-02-09 PROCEDURE — 85730 THROMBOPLASTIN TIME PARTIAL: CPT | Performed by: FAMILY MEDICINE

## 2025-02-09 PROCEDURE — 80061 LIPID PANEL: CPT | Performed by: FAMILY MEDICINE

## 2025-02-09 PROCEDURE — 83880 ASSAY OF NATRIURETIC PEPTIDE: CPT | Performed by: EMERGENCY MEDICINE

## 2025-02-09 PROCEDURE — 0202U NFCT DS 22 TRGT SARS-COV-2: CPT | Performed by: NURSE PRACTITIONER

## 2025-02-09 RX ORDER — HEPARIN SODIUM 1000 [USP'U]/ML
25 INJECTION, SOLUTION INTRAVENOUS; SUBCUTANEOUS AS NEEDED
Status: DISCONTINUED | OUTPATIENT
Start: 2025-02-09 | End: 2025-02-09

## 2025-02-09 RX ORDER — ASPIRIN 81 MG/1
81 TABLET ORAL NIGHTLY
Status: DISCONTINUED | OUTPATIENT
Start: 2025-02-09 | End: 2025-02-17 | Stop reason: HOSPADM

## 2025-02-09 RX ORDER — LISINOPRIL 20 MG/1
40 TABLET ORAL NIGHTLY
Status: DISCONTINUED | OUTPATIENT
Start: 2025-02-09 | End: 2025-02-11

## 2025-02-09 RX ORDER — SODIUM CHLORIDE 9 MG/ML
40 INJECTION, SOLUTION INTRAVENOUS AS NEEDED
Status: DISCONTINUED | OUTPATIENT
Start: 2025-02-09 | End: 2025-02-17 | Stop reason: HOSPADM

## 2025-02-09 RX ORDER — FUROSEMIDE 10 MG/ML
20 INJECTION INTRAMUSCULAR; INTRAVENOUS
Status: DISCONTINUED | OUTPATIENT
Start: 2025-02-09 | End: 2025-02-10

## 2025-02-09 RX ORDER — HEPARIN SODIUM 1000 [USP'U]/ML
4000 INJECTION, SOLUTION INTRAVENOUS; SUBCUTANEOUS ONCE
Status: COMPLETED | OUTPATIENT
Start: 2025-02-09 | End: 2025-02-09

## 2025-02-09 RX ORDER — MORPHINE SULFATE 2 MG/ML
1 INJECTION, SOLUTION INTRAMUSCULAR; INTRAVENOUS EVERY 4 HOURS PRN
Status: DISPENSED | OUTPATIENT
Start: 2025-02-09 | End: 2025-02-14

## 2025-02-09 RX ORDER — POLYETHYLENE GLYCOL 3350 17 G/17G
17 POWDER, FOR SOLUTION ORAL DAILY PRN
Status: DISCONTINUED | OUTPATIENT
Start: 2025-02-09 | End: 2025-02-17 | Stop reason: HOSPADM

## 2025-02-09 RX ORDER — SODIUM CHLORIDE 0.9 % (FLUSH) 0.9 %
10 SYRINGE (ML) INJECTION AS NEEDED
Status: DISCONTINUED | OUTPATIENT
Start: 2025-02-09 | End: 2025-02-17 | Stop reason: HOSPADM

## 2025-02-09 RX ORDER — SODIUM CHLORIDE 0.9 % (FLUSH) 0.9 %
10 SYRINGE (ML) INJECTION EVERY 12 HOURS SCHEDULED
Status: DISCONTINUED | OUTPATIENT
Start: 2025-02-09 | End: 2025-02-17 | Stop reason: HOSPADM

## 2025-02-09 RX ORDER — HEPARIN SODIUM 10000 [USP'U]/100ML
12 INJECTION, SOLUTION INTRAVENOUS
Status: DISCONTINUED | OUTPATIENT
Start: 2025-02-09 | End: 2025-02-12

## 2025-02-09 RX ORDER — POLYETHYLENE GLYCOL 3350 17 G/17G
17 POWDER, FOR SOLUTION ORAL DAILY PRN
Status: DISCONTINUED | OUTPATIENT
Start: 2025-02-09 | End: 2025-02-12

## 2025-02-09 RX ORDER — BISACODYL 5 MG/1
5 TABLET, DELAYED RELEASE ORAL DAILY PRN
Status: DISCONTINUED | OUTPATIENT
Start: 2025-02-09 | End: 2025-02-12

## 2025-02-09 RX ORDER — FENOFIBRATE 145 MG/1
145 TABLET, COATED ORAL NIGHTLY
Status: DISCONTINUED | OUTPATIENT
Start: 2025-02-09 | End: 2025-02-17 | Stop reason: HOSPADM

## 2025-02-09 RX ORDER — AMOXICILLIN 250 MG
2 CAPSULE ORAL 2 TIMES DAILY
Status: DISCONTINUED | OUTPATIENT
Start: 2025-02-09 | End: 2025-02-17 | Stop reason: HOSPADM

## 2025-02-09 RX ORDER — BISACODYL 10 MG
10 SUPPOSITORY, RECTAL RECTAL DAILY PRN
Status: DISCONTINUED | OUTPATIENT
Start: 2025-02-09 | End: 2025-02-17 | Stop reason: HOSPADM

## 2025-02-09 RX ORDER — CLOPIDOGREL BISULFATE 75 MG/1
75 TABLET ORAL DAILY
Status: DISCONTINUED | OUTPATIENT
Start: 2025-02-09 | End: 2025-02-17 | Stop reason: HOSPADM

## 2025-02-09 RX ORDER — ASPIRIN 81 MG/1
81 TABLET ORAL DAILY
Status: DISCONTINUED | OUTPATIENT
Start: 2025-02-09 | End: 2025-02-09

## 2025-02-09 RX ORDER — BISACODYL 10 MG
10 SUPPOSITORY, RECTAL RECTAL DAILY PRN
Status: DISCONTINUED | OUTPATIENT
Start: 2025-02-09 | End: 2025-02-12

## 2025-02-09 RX ORDER — BISACODYL 5 MG/1
5 TABLET, DELAYED RELEASE ORAL DAILY PRN
Status: DISCONTINUED | OUTPATIENT
Start: 2025-02-09 | End: 2025-02-17 | Stop reason: HOSPADM

## 2025-02-09 RX ORDER — AMOXICILLIN 250 MG
2 CAPSULE ORAL 2 TIMES DAILY PRN
Status: DISCONTINUED | OUTPATIENT
Start: 2025-02-09 | End: 2025-02-12

## 2025-02-09 RX ORDER — CARVEDILOL 6.25 MG/1
6.25 TABLET ORAL 2 TIMES DAILY WITH MEALS
Status: DISCONTINUED | OUTPATIENT
Start: 2025-02-09 | End: 2025-02-10

## 2025-02-09 RX ORDER — NALOXONE HCL 0.4 MG/ML
0.4 VIAL (ML) INJECTION
Status: DISCONTINUED | OUTPATIENT
Start: 2025-02-09 | End: 2025-02-17 | Stop reason: HOSPADM

## 2025-02-09 RX ORDER — HEPARIN SODIUM 1000 [USP'U]/ML
50 INJECTION, SOLUTION INTRAVENOUS; SUBCUTANEOUS AS NEEDED
Status: DISCONTINUED | OUTPATIENT
Start: 2025-02-09 | End: 2025-02-09

## 2025-02-09 RX ORDER — METOPROLOL SUCCINATE 25 MG/1
25 TABLET, EXTENDED RELEASE ORAL
Status: DISCONTINUED | OUTPATIENT
Start: 2025-02-09 | End: 2025-02-09

## 2025-02-09 RX ORDER — LISINOPRIL 5 MG/1
5 TABLET ORAL DAILY
Status: DISCONTINUED | OUTPATIENT
Start: 2025-02-09 | End: 2025-02-09

## 2025-02-09 RX ORDER — NITROGLYCERIN 0.4 MG/1
0.4 TABLET SUBLINGUAL
Status: DISCONTINUED | OUTPATIENT
Start: 2025-02-09 | End: 2025-02-17 | Stop reason: HOSPADM

## 2025-02-09 RX ORDER — LISINOPRIL 5 MG/1
5 TABLET ORAL NIGHTLY
Status: DISCONTINUED | OUTPATIENT
Start: 2025-02-09 | End: 2025-02-09

## 2025-02-09 RX ADMIN — FUROSEMIDE 20 MG: 10 INJECTION, SOLUTION INTRAMUSCULAR; INTRAVENOUS at 18:04

## 2025-02-09 RX ADMIN — CLOPIDOGREL BISULFATE 75 MG: 75 TABLET ORAL at 18:04

## 2025-02-09 RX ADMIN — MORPHINE SULFATE 1 MG: 2 INJECTION, SOLUTION INTRAMUSCULAR; INTRAVENOUS at 19:10

## 2025-02-09 RX ADMIN — LISINOPRIL 40 MG: 20 TABLET ORAL at 22:11

## 2025-02-09 RX ADMIN — Medication 10 ML: at 17:02

## 2025-02-09 RX ADMIN — FENOFIBRATE 145 MG: 145 TABLET, FILM COATED ORAL at 22:11

## 2025-02-09 RX ADMIN — HEPARIN SODIUM 4000 UNITS: 1000 INJECTION INTRAVENOUS; SUBCUTANEOUS at 17:02

## 2025-02-09 RX ADMIN — HEPARIN SODIUM 12 UNITS/KG/HR: 10000 INJECTION, SOLUTION INTRAVENOUS at 17:01

## 2025-02-09 RX ADMIN — CARVEDILOL 6.25 MG: 6.25 TABLET, FILM COATED ORAL at 18:05

## 2025-02-09 NOTE — CONSULTS
Referring Provider: Michael Hernandez    Reason for Consultation: Elevated troponin, HFrEF, elevated proBNP, uncontrolled hypertension      Patient Care Team:  Dre Henry MD as PCP - General (Family Medicine)      SUBJECTIVE     Chief Complaint: Generalized weakness, shortness of breath    History of present illness:  Abebe Crockett is a 84 y.o. male  with multiple cardiovascular risk factors including hypertension, hyperlipidemia, diabetes who was recently admitted in the hospital for hypertensive emergency, acute/newly diagnosed HFrEF, pulmonary edema, respiratory failure with influenza A bronchitis, elevated troponin.  He was treated with IV nitroglycerin and diuretics.  Echocardiogram showed EF of 30 to 35%.  He was started on Coreg and Jardiance with plans for outpatient follow-up and discussion regarding ischemic workup after he recovered from influenza.  He returns to the hospital within 2 days of discharge with complaints of generalized weakness and shortness of breath.  Initial high-sensitivity troponin is 768 compared to 188 during the previous admission.  proBNP is 13,000 compared to 14,000 in the previous admission.  He denies any chest pain and ECG does not show any acute ischemic changes.  He will be admitted to the hospital under observation.  Of note he is still positive for influenza A.    Review of systems:    Constitutional: + weakness, + fatigue, fever, rigors, chills   Eyes: No vision changes, eye pain   ENT/oropharynx: No difficulty swallowing, sore throat, epistaxis, changes in hearing   Cardiovascular: No chest pain, chest tightness, palpitations, paroxysmal nocturnal dyspnea, orthopnea, diaphoresis, dizziness / syncopal episode   Respiratory: No shortness of breath, dyspnea on exertion, cough, wheezing, hemoptysis   Gastrointestinal: No abdominal pain, nausea, vomiting, diarrhea, bloody stools   Genitourinary: No hematuria, dysuria   Neurological: No headache, tremors, numbness,  one-sided weakness    Musculoskeletal: No cramps, myalgias, joint pain, joint swelling   Integument: No rash, edema        Personal History:      Past Medical History:   Diagnosis Date    Acute systolic heart failure 2025    Diabetes mellitus     Hypertension     Sleep apnea     CPAP COMPLIANT       Past Surgical History:   Procedure Laterality Date    COLONOSCOPY         Family History   Problem Relation Age of Onset    Cancer Father        Social History     Tobacco Use    Smoking status: Former     Current packs/day: 0.00     Types: Cigarettes     Quit date: 1965     Years since quittin.1     Passive exposure: Never    Smokeless tobacco: Never   Vaping Use    Vaping status: Never Used   Substance Use Topics    Alcohol use: No    Drug use: No        Home meds:  Prior to Admission medications    Medication Sig Start Date End Date Taking? Authorizing Provider   ASPIRIN 81 PO Take  by mouth.    Lydia Munson MD   carvedilol (COREG) 6.25 MG tablet Take 1 tablet by mouth 2 (Two) Times a Day With Meals for 30 days. 2/6/25 3/8/25  David Aguilar DO   clotrimazole-betamethasone (LOTRISONE) 1-0.05 % cream  22   Lydia Munson MD   empagliflozin (Jardiance) 10 MG tablet tablet Take 1 tablet by mouth Daily for 30 days. 2/6/25 3/8/25  David Aguilar DO   fenofibrate (TRICOR) 145 MG tablet  22   Lydia Munson MD   lisinopril (PRINIVIL,ZESTRIL) 40 MG tablet Take 1 tablet by mouth once daily 21   Adeel Myles MD   metFORMIN (GLUCOPHAGE) 500 MG tablet Take  by mouth. 10/23/23   Lydia Munson MD   oseltamivir (TAMIFLU) 30 MG capsule Take 1 capsule by mouth Every 12 (Twelve) Hours for 6 doses. Indications: Influenza A Virus Infection 25  David Aguilar DO   vitamin D3 125 MCG (5000 UT) capsule capsule Take 1 capsule by mouth Daily.    Lydia Munson MD       Allergies:     Atorvastatin    Scheduled Meds:   Continuous Infusions:   PRN Meds:   "sodium chloride      OBJECTIVE    Vital Signs  Vitals:    02/09/25 1223 02/09/25 1329   BP: (!) 169/105 149/81   BP Location: Left arm    Patient Position: Sitting    Pulse: 70 66   Resp: 18    Temp: 97.8 °F (36.6 °C)    TempSrc: Oral    SpO2: 97% 97%   Weight: 74.4 kg (164 lb)    Height: 170.2 cm (67\")        Flowsheet Rows      Flowsheet Row First Filed Value   Admission Height 170.2 cm (67\") Documented at 02/09/2025 1223   Admission Weight 74.4 kg (164 lb) Documented at 02/09/2025 1223            No intake or output data in the 24 hours ending 02/09/25 1420     Telemetry: Sinus rhythm    Physical Exam:  The patient is alert, oriented and in no distress.  Vital signs as noted above.  Head and neck revealed no carotid bruits or jugular venous distention.  No thyromegaly or lymphadenopathy is present  Distant and diminished breath sound  Heart: Normal first and second heart sounds. No murmur.  No precordial rub is present.  No gallop is present.  Abdomen: Soft and nontender.  No organomegaly is present.  Extremities with good peripheral pulses with 1 + bilateral pedal edema.  Skin: Warm and dry.  Musculoskeletal system is grossly normal.  CNS grossly normal.       Results Review:  I have personally reviewed the results from the time of this admission to 2/9/2025 14:20 EST and agree with these findings:  []  Laboratory  []  Microbiology  []  Radiology  []  EKG/Telemetry   []  Cardiology/Vascular   []  Pathology  []  Old records  []  Other:    Most notable findings include:     Lab Results (last 24 hours)       Procedure Component Value Units Date/Time    High Sensitivity Troponin T 1Hr [[id number]] Collected: 02/09/25 1351    Specimen: Blood Updated: 02/09/25 1355    Respiratory Panel PCR w/COVID-19(SARS-CoV-2) ANGEL/MATTHEW/JOSE/PAD/COR/ELIO In-House, NP Swab in UTM/VTM, 2 HR TAT - Swab, Nasopharynx [[id number]]  (Abnormal) Collected: 02/09/25 1254    Specimen: Swab from Nasopharynx Updated: 02/09/25 1347     ADENOVIRUS, PCR " Not Detected     Coronavirus 229E Not Detected     Coronavirus HKU1 Not Detected     Coronavirus NL63 Not Detected     Coronavirus OC43 Not Detected     COVID19 Not Detected     Human Metapneumovirus Not Detected     Human Rhinovirus/Enterovirus Not Detected     Influenza A H1 2009 PCR Detected     Influenza B PCR Not Detected     Parainfluenza Virus 1 Not Detected     Parainfluenza Virus 2 Not Detected     Parainfluenza Virus 3 Not Detected     Parainfluenza Virus 4 Not Detected     RSV, PCR Not Detected     Bordetella pertussis pcr Not Detected     Bordetella parapertussis PCR Not Detected     Chlamydophila pneumoniae PCR Not Detected     Mycoplasma pneumo by PCR Not Detected    Narrative:      In the setting of a positive respiratory panel with a viral infection PLUS a negative procalcitonin without other underlying concern for bacterial infection, consider observing off antibiotics or discontinuation of antibiotics and continue supportive care. If the respiratory panel is positive for atypical bacterial infection (Bordetella pertussis, Chlamydophila pneumoniae, or Mycoplasma pneumoniae), consider antibiotic de-escalation to target atypical bacterial infection.    High Sensitivity Troponin T [[id number]]  (Abnormal) Collected: 02/09/25 1248    Specimen: Blood Updated: 02/09/25 1327     HS Troponin T 768 ng/L     Narrative:      High Sensitive Troponin T Reference Range:  <14.0 ng/L- Negative Female for AMI  <22.0 ng/L- Negative Male for AMI  >=14 - Abnormal Female indicating possible myocardial injury.  >=22 - Abnormal Male indicating possible myocardial injury.   Clinicians would have to utilize clinical acumen, EKG, Troponin, and serial changes to determine if it is an Acute Myocardial Infarction or myocardial injury due to an underlying chronic condition.         Comprehensive Metabolic Panel [[id number]]  (Abnormal) Collected: 02/09/25 1248    Specimen: Blood Updated: 02/09/25 1327     Glucose 195 mg/dL       Comment: Glucose >180, Hemoglobin A1C recommended.        BUN 28 mg/dL      Creatinine 1.47 mg/dL      Sodium 135 mmol/L      Potassium 4.3 mmol/L      Chloride 100 mmol/L      CO2 26.1 mmol/L      Calcium 9.0 mg/dL      Total Protein 6.3 g/dL      Albumin 3.5 g/dL      ALT (SGPT) 25 U/L      AST (SGOT) 42 U/L      Alkaline Phosphatase 39 U/L      Total Bilirubin 0.4 mg/dL      Globulin 2.8 gm/dL      A/G Ratio 1.3 g/dL      BUN/Creatinine Ratio 19.0     Anion Gap 8.9 mmol/L      eGFR 47.3 mL/min/1.73     Narrative:      GFR Categories in Chronic Kidney Disease (CKD)      GFR Category          GFR (mL/min/1.73)    Interpretation  G1                     90 or greater         Normal or high (1)  G2                      60-89                Mild decrease (1)  G3a                   45-59                Mild to moderate decrease  G3b                   30-44                Moderate to severe decrease  G4                    15-29                Severe decrease  G5                    14 or less           Kidney failure          (1)In the absence of evidence of kidney disease, neither GFR category G1 or G2 fulfill the criteria for CKD.    eGFR calculation 2021 CKD-EPI creatinine equation, which does not include race as a factor    BNP [[id number]]  (Abnormal) Collected: 02/09/25 1248    Specimen: Blood Updated: 02/09/25 1326     proBNP 12,953.0 pg/mL     Narrative:      This assay is used as an aid in the diagnosis of individuals suspected of having heart failure. It can be used as an aid in the diagnosis of acute decompensated heart failure (ADHF) in patients presenting with signs and symptoms of ADHF to the emergency department (ED). In addition, NT-proBNP of <300 pg/mL indicates ADHF is not likely.    Age Range Result Interpretation  NT-proBNP Concentration (pg/mL:      <50             Positive            >450                   Gray                 300-450                    Negative             <300    50-75            Positive            >900                  Gray                300-900                  Negative            <300      >75             Positive            >1800                  Gray                300-1800                  Negative            <300    CBC & Differential [[id number]]  (Abnormal) Collected: 02/09/25 1248    Specimen: Blood Updated: 02/09/25 1309    Narrative:      The following orders were created for panel order CBC & Differential.  Procedure                               Abnormality         Status                     ---------                               -----------         ------                     CBC Auto Differential[[id number]]        Abnormal            Final result                 Please view results for these tests on the individual orders.    CBC Auto Differential [[id number]]  (Abnormal) Collected: 02/09/25 1248    Specimen: Blood Updated: 02/09/25 1309     WBC 3.82 10*3/mm3      RBC 4.22 10*6/mm3      Hemoglobin 12.9 g/dL      Hematocrit 37.6 %      MCV 89.1 fL      MCH 30.6 pg      MCHC 34.3 g/dL      RDW 13.2 %      RDW-SD 42.9 fl      MPV 9.8 fL      Platelets 279 10*3/mm3      Neutrophil % 67.1 %      Lymphocyte % 17.5 %      Monocyte % 12.3 %      Eosinophil % 1.8 %      Basophil % 0.5 %      Immature Grans % 0.8 %      Neutrophils, Absolute 2.56 10*3/mm3      Lymphocytes, Absolute 0.67 10*3/mm3      Monocytes, Absolute 0.47 10*3/mm3      Eosinophils, Absolute 0.07 10*3/mm3      Basophils, Absolute 0.02 10*3/mm3      Immature Grans, Absolute 0.03 10*3/mm3      nRBC 0.0 /100 WBC     [place] Draw [[id number]] Collected: 02/09/25 1248    Specimen: Blood Updated: 02/09/25 1300    Narrative:      The following orders were created for panel order [place] Draw.  Procedure                               Abnormality         Status                     ---------                               -----------         ------                     Green Top (Gel)[[id number]]                                   Final result               Lavender Top[[id number]]                                     Final result               Gold Top - SST[[id number]]                                   Final result               Light Blue Top[[id number]]                                   Final result                 Please view results for these tests on the individual orders.    Green Top (Gel) [[id number]] Collected: 02/09/25 1248    Specimen: Blood Updated: 02/09/25 1300     Extra Tube Hold for add-ons.     Comment: Auto resulted.       Lavender Top [[id number]] Collected: 02/09/25 1248    Specimen: Blood Updated: 02/09/25 1300     Extra Tube hold for add-on     Comment: Auto resulted       Gold Top - SST [[id number]] Collected: 02/09/25 1248    Specimen: Blood Updated: 02/09/25 1300     Extra Tube Hold for add-ons.     Comment: Auto resulted.       Light Blue Top [[id number]] Collected: 02/09/25 1248    Specimen: Blood Updated: 02/09/25 1300     Extra Tube Hold for add-ons.     Comment: Auto resulted               Imaging Results (Last 24 Hours)       Procedure Component Value Units Date/Time    XR Chest 1 View [[id number]] Collected: 02/09/25 1309     Updated: 02/09/25 1312    Narrative:      PROCEDURE: XR CHEST 1 VW-     HISTORY: [facility] Triage Protocol     COMPARISON: 2/4/2025     FINDINGS:  Portable view of the chest demonstrates pulmonary edema has  improved. Pulmonary vascular congestion is noted. Small pleural  effusions are seen. There is no pneumothorax. The mediastinum is  unremarkable.     The heart size is normal.       Impression:      Mild CHF, significantly improved           This report was signed and finalized on 2/9/2025 1:10 PM by Eric Soria MD.               LAB RESULTS (LAST 7 DAYS)    CBC  Results from last 7 days   Lab Units 02/09/25  1248 02/06/25  0443 02/05/25  0536 02/04/25  2131 02/03/25  0908   WBC 10*3/mm3 3.82 6.28 9.98 12.88* 7.15   RBC 10*6/mm3 4.22 3.79* 3.90* 4.55 4.17   HEMOGLOBIN g/dL 12.9* 11.7* 12.0* 14.3  12.8*   HEMATOCRIT % 37.6 33.1* 34.3* 40.8 37.9   MCV fL 89.1 87.3 87.9 89.7 90.9   PLATELETS 10*3/mm3 279 241 262 337 325       BMP  Results from last 7 days   Lab Units 02/09/25 1248 02/06/25 0443 02/05/25 0536 02/04/25 2131 02/03/25  0908   SODIUM mmol/L 135* 132* 131* 132* 132*   POTASSIUM mmol/L 4.3 3.6 4.1 4.5 5.1   CHLORIDE mmol/L 100 94* 96* 97* 96*   CO2 mmol/L 26.1 27.4 24.9 25.5 26.4   BUN mg/dL 28* 26* 21 15 20   CREATININE mg/dL 1.47* 1.69* 1.35* 1.36* 1.41*   GLUCOSE mg/dL 195* 108* 132* 154* 131*       CMP   Results from last 7 days   Lab Units 02/09/25 1248 02/06/25 0443 02/05/25 0536 02/04/25 2131 02/03/25  0908   SODIUM mmol/L 135* 132* 131* 132* 132*   POTASSIUM mmol/L 4.3 3.6 4.1 4.5 5.1   CHLORIDE mmol/L 100 94* 96* 97* 96*   CO2 mmol/L 26.1 27.4 24.9 25.5 26.4   BUN mg/dL 28* 26* 21 15 20   CREATININE mg/dL 1.47* 1.69* 1.35* 1.36* 1.41*   GLUCOSE mg/dL 195* 108* 132* 154* 131*   ALBUMIN g/dL 3.5  --   --  4.2  --    BILIRUBIN mg/dL 0.4  --   --  0.4  --    ALK PHOS U/L 39  --   --  48  --    AST (SGOT) U/L 42*  --   --  34  --    ALT (SGPT) U/L 25  --   --  20  --        BNP        TROPONIN  Results from last 7 days   Lab Units 02/09/25 1248   HSTROP T ng/L 768*       CoAg        Creatinine Clearance  Estimated Creatinine Clearance: 40.8 mL/min (A) (by C-G formula based on SCr of 1.47 mg/dL (H)).    ABG          Radiology  XR Chest 1 View    Result Date: 2/9/2025  Mild CHF, significantly improved    This report was signed and finalized on 2/9/2025 1:10 PM by Eric Soria MD.         EKG  I personally viewed and interpreted the patient's EKG/Telemetry data:  ECG 12 Lead ED Triage Standing Order; SOA   Final Result            Echocardiogram:    Results for orders placed during the hospital encounter of 02/04/25    Adult Transthoracic Echo Complete W/ Cont if Necessary Per Protocol    Interpretation Summary  1.  Mild left ventricular dilation with moderately reduced LV systolic function,  LVEF 30-35%.  2.  Moderate global LV hypokinesis.  3.  Grade 2 diastolic dysfunction.  4.  Normal right ventricular size and systolic function by TAPSE.  5.  Severely increased left atrial volume index.  6.  Mild tricuspid regurgitation.  7.  Left pleural effusion.        Stress Test:        Cardiac Catheterization:  No results found for this or any previous visit.        Other:      ASSESSMENT & PLAN:    Active Problems:    * No active hospital problems. *    Elevated troponin  ECG showed nonspecific ST-T wave changes in inferior and lateral leads  Significantly elevated high-sensitivity troponin when compared to previous hospital admission.  High-sensitivity troponin of 768, 787.  Previously 188.  High-sensitivity troponin elevation could be secondary to HFrEF and demand ischemia in the setting of uncontrolled hypertension and presence of chronic kidney disease  In the absence of chest pain, flat troponin: No indication for urgent coronary angiography  Timing of cardiac catheterization per Dr. Myles.  Start aspirin, high intensity statin and beta-blocker.    HFrEF  Recent echocardiogram showed EF of 30 to 35% with global LV hypokinesis  proBNP is 13,000 compared to 14,000 previously  He is not in acute exacerbation  Resume Coreg and Jardiance  Resume lisinopril  Also start diuretics   Monitor I's and O's and replace electrolytes as needed    Diabetes/hyperlipidemia  A1c is 5.8  , HDL 30, triglyceride 89 and total cholesterol 174.  Start high intensity statin.  Insulin sliding scale    Influenza A  Complete treatment with Tamiflu     Chronic kidney disease  Creatinine 1.5, GFR is 47.3   Closely monitor renal function while on diuretics and lisinopril      Emerson Anglin MD  02/09/25  14:20 EST

## 2025-02-09 NOTE — H&P
[facility]   HISTORY AND PHYSICAL      Name:  Abebe Crockett   Age:  84 y.o.  Sex:  male  :  1940  MRN:  [id number]   Visit Number:  [id number]  Admission Date:  2025  Date Of Service:  25  Primary Care Physician:  Dre Henry MD    Chief Complaint:     Dyspnea    History Of Presenting Illness:      Patient is a 84-year-old male brought to emergency department for evaluation of dyspnea.  Patient reports has been ongoing for several days, has been progressively worse in the last 72 hours.  Patient was recently discharged from our facility on  following treatment of acute systolic heart failure, influenza A.  Patient reports the dyspnea is significantly worsened with ambulation.  Patient has not short of breath at rest.  Patient denies any chest pain, nausea, vomiting, diaphoresis.     Review Of Systems:    All systems were reviewed and negative except as mentioned in history of presenting illness, assessment and plan.    Past Medical History: Patient  has a past medical history of Acute systolic heart failure (2025), Diabetes mellitus, Hypertension, and Sleep apnea.    Past Surgical History: Patient  has a past surgical history that includes Colonoscopy.    Social History: Patient  reports that he quit smoking about 60 years ago. His smoking use included cigarettes. He has never been exposed to tobacco smoke. He has never used smokeless tobacco. He reports that he does not drink alcohol and does not use drugs.    Family History:  Patient's family history has been reviewed and found to be noncontributory.     Allergies:      Atorvastatin    Home Medications:    Prior to Admission Medications       Prescriptions Last Dose Informant Patient Reported? Taking?    ASPIRIN 81 PO   Yes No    Take  by mouth.    carvedilol (COREG) 6.25 MG tablet   No No    Take 1 tablet by mouth 2 (Two) Times a Day With Meals for 30 days.    clotrimazole-betamethasone (LOTRISONE)  1-0.05 % cream   Yes No    empagliflozin (Jardiance) 10 MG tablet tablet   No No    Take 1 tablet by mouth Daily for 30 days.    fenofibrate (TRICOR) 145 MG tablet  Medication Bottle Yes No    lisinopril (PRINIVIL,ZESTRIL) 40 MG tablet   No No    Take 1 tablet by mouth once daily    metFORMIN (GLUCOPHAGE) 500 MG tablet   Yes No    Take  by mouth.    oseltamivir (TAMIFLU) 30 MG capsule   No No    Take 1 capsule by mouth Every 12 (Twelve) Hours for 6 doses. Indications: Influenza A Virus Infection    vitamin D3 125 MCG (5000 UT) capsule capsule   Yes No    Take 1 capsule by mouth Daily.          ED Medications:    Medications   sodium chloride 0.9 % flush 10 mL (has no administration in time range)   sodium chloride 0.9 % flush 10 mL (has no administration in time range)   sodium chloride 0.9 % flush 10 mL (has no administration in time range)   sodium chloride 0.9 % infusion 40 mL (has no administration in time range)   nitroglycerin (NITROSTAT) SL tablet 0.4 mg (has no administration in time range)   sennosides-docusate (PERICOLACE) 8.6-50 MG per tablet 2 tablet (has no administration in time range)     And   polyethylene glycol (MIRALAX) packet 17 g (has no administration in time range)     And   bisacodyl (DULCOLAX) EC tablet 5 mg (has no administration in time range)     And   bisacodyl (DULCOLAX) suppository 10 mg (has no administration in time range)   Potassium Replacement - Follow Nurse / BPA Driven Protocol (has no administration in time range)   Magnesium Cardiology Dose Replacement - Follow Nurse / BPA Driven Protocol (has no administration in time range)   Phosphorus Replacement - Follow Nurse / BPA Driven Protocol (has no administration in time range)   Calcium Replacement - Follow Nurse / BPA Driven Protocol (has no administration in time range)   aspirin EC tablet 81 mg (has no administration in time range)   clopidogrel (PLAVIX) tablet 75 mg (has no administration in time range)   metoprolol succinate  "XL (TOPROL-XL) 24 hr tablet 25 mg (has no administration in time range)   lisinopril (PRINIVIL,ZESTRIL) tablet 5 mg (has no administration in time range)   Morphine sulfate (PF) injection 1 mg (has no administration in time range)     And   naloxone (NARCAN) injection 0.4 mg (has no administration in time range)   sennosides-docusate (PERICOLACE) 8.6-50 MG per tablet 2 tablet (has no administration in time range)     And   polyethylene glycol (MIRALAX) packet 17 g (has no administration in time range)     And   bisacodyl (DULCOLAX) EC tablet 5 mg (has no administration in time range)     And   bisacodyl (DULCOLAX) suppository 10 mg (has no administration in time range)   heparin (porcine) injection 4,000 Units (has no administration in time range)   heparin 13661 units/250 ml (100 units/ml) in D5W (has no administration in time range)   heparin (porcine) injection 3,720 Units (has no administration in time range)   heparin (porcine) injection 1,860 Units (has no administration in time range)   Pharmacy to Dose Heparin (has no administration in time range)     Vital Signs:  Temp:  [97.8 °F (36.6 °C)] 97.8 °F (36.6 °C)  Heart Rate:  [63-70] 68  Resp:  [18] 18  BP: (149-169)/() 156/77        02/09/25  1223   Weight: 74.4 kg (164 lb)     Body mass index is 25.69 kg/m².    Physical Exam:     Most recent vital Signs: /77   Pulse 68   Temp 97.8 °F (36.6 °C) (Oral)   Resp 18   Ht 170.2 cm (67\")   Wt 74.4 kg (164 lb)   SpO2 96%   BMI 25.69 kg/m²     Physical Exam  Constitutional: Awake, alert  Eyes: PERRLA, sclerae anicteric, no conjunctival injection  HENT: NCAT, mucous membranes moist  Neck: Supple, no thyromegaly, no lymphadenopathy, trachea midline  Respiratory: Clear to auscultation bilaterally, nonlabored respirations   Cardiovascular: RRR, no murmurs, rubs, or gallops, palpable pedal pulses bilaterally  Gastrointestinal: Positive bowel sounds, soft, nontender, nondistended  Musculoskeletal: No " bilateral ankle edema, no clubbing or cyanosis to extremities  Psychiatric: Appropriate affect, cooperative  Neurologic: Oriented x 3, strength symmetric in all extremities, Cranial Nerves grossly intact to confrontation, speech clear  Skin: No rashes     Laboratory data:    I have reviewed the labs done in the emergency room.    Results from last 7 days   Lab Units 02/09/25  1248 02/06/25  0443 02/05/25  0536 02/04/25  2131   SODIUM mmol/L 135* 132* 131* 132*   POTASSIUM mmol/L 4.3 3.6 4.1 4.5   CHLORIDE mmol/L 100 94* 96* 97*   CO2 mmol/L 26.1 27.4 24.9 25.5   BUN mg/dL 28* 26* 21 15   CREATININE mg/dL 1.47* 1.69* 1.35* 1.36*   CALCIUM mg/dL 9.0 8.5* 8.5* 9.6   BILIRUBIN mg/dL 0.4  --   --  0.4   ALK PHOS U/L 39  --   --  48   ALT (SGPT) U/L 25  --   --  20   AST (SGOT) U/L 42*  --   --  34   GLUCOSE mg/dL 195* 108* 132* 154*     Results from last 7 days   Lab Units 02/09/25  1248 02/06/25  0443 02/05/25  0536   WBC 10*3/mm3 3.82 6.28 9.98   HEMOGLOBIN g/dL 12.9* 11.7* 12.0*   HEMATOCRIT % 37.6 33.1* 34.3*   PLATELETS 10*3/mm3 279 241 262         Results from last 7 days   Lab Units 02/09/25  1351 02/09/25  1248 02/05/25  0012   HSTROP T ng/L 787* 768* 188*     Results from last 7 days   Lab Units 02/09/25  1248   PROBNP pg/mL 12,953.0*     Results from last 7 days   Lab Units 02/05/25  0015   COLOR UA  Yellow   GLUCOSE UA  Negative   KETONES UA  Negative   BLOOD UA  Trace*   LEUKOCYTES UA  Negative   PH, URINE  <=5.0   BILIRUBIN UA  Negative   UROBILINOGEN UA  0.2 E.U./dL   RBC UA /HPF 0-2   WBC UA /HPF None Seen     Radiology:    XR Chest 1 View    Result Date: 2/9/2025  PROCEDURE: XR CHEST 1 VW-  HISTORY: SOA Triage Protocol  COMPARISON: 2/4/2025  FINDINGS:  Portable view of the chest demonstrates pulmonary edema has improved. Pulmonary vascular congestion is noted. Small pleural effusions are seen. There is no pneumothorax. The mediastinum is unremarkable.  The heart size is normal.      Mild CHF,  significantly improved    This report was signed and finalized on 2/9/2025 1:10 PM by Eric Soria MD.       Assessment:    NSTEMI  Heart failure with reduced ejection fraction  Influenza A  CKD  Type 2 diabetes mellitus  Hyperlipidemia  Hypertension    Plan:    NSTEMI  Patient's initial troponin is 768, subsequently 787.  Cardiology consulted in the ED, agreeable to consult.  Appreciate their recommendations.  Patient following her previous hospitalization apex at 188 on the day of discharge.  In the significant degree of elevation, will treat as NSTEMI.  Started heparinization.  Aspirin, Plavix, beta-blocker.  Patient has had adverse reaction to statins in the past, myalgias.  Will be held right now.  Definitive surgical intervention we deferred to cardiology services.  Systolic congestive heart failure with reduced ejection fraction  Patient recently had an echo on February 5, 2025, showed EF 30 to 35%.    referral to cardiac rehab placed.  Start Lasix 20 mg IV twice daily.  Patient appears euvolemic at the time of examination.  Blood pressure still elevated.  Influenza A  Patient received oseltamivir during her previous hospitalization, has been greater than 5 days since onset of symptoms.  No indication for Tamiflu at this time.  CKD  At baseline  Type 2 diabetes mellitus  Based insulin  Hyperlipidemia  Hypertension  Home medications as appropriate    Risk Assessment: High  DVT Prophylaxis: Heparin  Code Status: DNR/DNI  Diet: Cardiac, n.p.o. after midnight.    Elvin Gallegos DO  02/09/25  15:31 EST    Dictated utilizing Dragon dictation.

## 2025-02-09 NOTE — ED PROVIDER NOTES
Subjective   History of Present Illness    Review of Systems    Past Medical History:   Diagnosis Date    Acute systolic heart failure 2025    Diabetes mellitus     Hypertension     Sleep apnea     CPAP COMPLIANT       Allergies   Allergen Reactions    Atorvastatin Other (See Comments)     MEMORY ISSUES       Past Surgical History:   Procedure Laterality Date    COLONOSCOPY         Family History   Problem Relation Age of Onset    Cancer Father        Social History     Socioeconomic History    Marital status:    Tobacco Use    Smoking status: Former     Current packs/day: 0.00     Types: Cigarettes     Quit date: 1965     Years since quittin.1     Passive exposure: Never    Smokeless tobacco: Never   Vaping Use    Vaping status: Never Used   Substance and Sexual Activity    Alcohol use: No    Drug use: No    Sexual activity: Defer           Objective   Physical Exam    Procedures           ED Course  ED Course as of 25 1359   Sun 2025   1253 EKG: I reviewed and independently interpreted the EKG as:  Sinus rhythm of 80 bpm, normal axis, normal intervals, no ST elevation, lateral T wave inversions [CS]   1357 Spoke with md Karyna, cardiology on-call about patient's bump in troponin versus previous hospitalization.  Dr. Anglin is requesting admission to the hospital for observation, trending troponins. [KH]      ED Course User Index  [CS] Michael Hernandez MD  [KH] Logan Sewell APRN                                                       Medical Decision Making  Amount and/or Complexity of Data Reviewed  Labs: ordered.  Radiology: ordered.  ECG/medicine tests: ordered.    Risk  Prescription drug management.        Final diagnoses:   None       ED Disposition  ED Disposition       ED Disposition   Intended Admit    Condition   --    Comment   --               No follow-up provider specified.       Medication List      No changes were made to your prescriptions during  this visit.

## 2025-02-09 NOTE — PLAN OF CARE
Goal Outcome Evaluation:  Plan of Care Reviewed With: patient           Outcome Evaluation: New Admission

## 2025-02-09 NOTE — Clinical Note
A 6 fr sheath was successfully inserted using micropuncture technique with ultrasound guidance into the right femoral artery. Sheath insertion not delayed.
A 6 fr sheath was successfully inserted with ultrasound guidance into the right radial artery. Sheath insertion not delayed.
ACT = 235 (sec). ACT result was completed at 10:07 EST.
ACT was drawn at 10:03 EST.
All Patches/Pads removed. Skin Intact.
Allergies reviewed.  H&P note has been confirmed for the patient. Procedural consent has been signed.  Staff has reviewed the patient's labs.
Balloon advanced in the proximal left anterior descendining coronary artery..
Calculated contrast threshold is 125.03 mL.
Catheter inserted with wire simultaneously.
Femoral sheath sutured in place.
Hemostasis started on the right radial artery. R-Band was used in achieving hemostasis. Radial compression device applied to vessel. Hemostasis achieved successfully. Closure device additional comment: 16mls of air.
Inserted under fluoro.
Interventional Guidewire removed without incident
Interventional Guidewire removed without incident
Interventional Guidewire removed without incident for reshaping.
Interventional Guidewire removed without incident for reshaping.
Patient was given Post Procedure instruction by the staff.
Prepped: right groin and Right Wrist. Prepped with: ChloraPrep. The site was clipped.
Pressure bag with normal saline applied to Right femoral sheath.  Sheath sutured and covered with tegaderms.
Removed intact
Report was  verbally given at bedside. .
Second TR Band applied proximal to first band 10mls of air.
Stat CTA of abdomen and femoral arteries ordered per Dr. Le.  CT notified that patient will be off the table and transported to CT.
The left coronary artery was selectively engaged, injected and visualized.
The right coronary artery was selectively engaged, injected and visualized.
The right femoral pulse is +2.
The right femoral pulse is +2.
The right radial pulse is +2.
The right radial pulse is +2.
Wire advanced in the left anterior descending first diagonal. .
Wire inserted in left anterior descending.
Wire inserted in left anterior descending.
catheter inserted over wire.
catheter removed  over the wire.
catheter removed.
guide catheter removed .
inserted over wire.
removed.
Never smoker

## 2025-02-09 NOTE — ED PROVIDER NOTES
Pt Name: Abebe Crockett  MRN: [id number]  : 1940  Date of Encounter: 2025    PCP: Dre Henry MD      Subjective    History of Present Illness:    Chief Complaint: Shortness of breath    History of Present Illness: Abebe Crockett is a 84 y.o. male who presents to the ER complaining of shortness of breath that started earlier today.  Patient was recently admitted to the hospital for acute respiratory failure with hypoxia was discharged from the hospital on 2025.  Patient states he was diagnosed with influenza earlier this week and has had worsening shortness of breath..  P  Triage Vitals:    ED Triage Vitals [25 1223]   Temp Heart Rate Resp BP SpO2   97.8 °F (36.6 °C) 70 18 (!) 169/105 97 %      Temp src Heart Rate Source Patient Position BP Location FiO2 (%)   Oral Monitor Sitting Left arm --       Nurses Notes reviewed and agree, including vitals, allergies, social history and prior medical history.     Atorvastatin    Past Medical History:   Diagnosis Date    Acute systolic heart failure 2025    Diabetes mellitus     Hypertension     Sleep apnea     CPAP COMPLIANT       Past Surgical History:   Procedure Laterality Date    COLONOSCOPY         Social History     Socioeconomic History    Marital status:    Tobacco Use    Smoking status: Former     Current packs/day: 0.00     Types: Cigarettes     Quit date: 1965     Years since quittin.1     Passive exposure: Never    Smokeless tobacco: Never   Vaping Use    Vaping status: Never Used   Substance and Sexual Activity    Alcohol use: No    Drug use: No    Sexual activity: Defer       Family History   Problem Relation Age of Onset    Cancer Father        REVIEW OF SYSTEMS:     All systems reviewed and not pertinent unless noted.    Review of Systems   Respiratory:  Positive for shortness of breath and wheezing.    Musculoskeletal:  Positive for myalgias.   All other systems reviewed and are  negative.      Objective    Physical Exam  Vitals and nursing note reviewed.   Constitutional:       Appearance: Normal appearance.   HENT:      Head: Normocephalic and atraumatic.   Eyes:      Extraocular Movements: Extraocular movements intact.      Pupils: Pupils are equal, round, and reactive to light.   Cardiovascular:      Rate and Rhythm: Normal rate and regular rhythm.      Pulses: Normal pulses.      Heart sounds: Normal heart sounds.   Pulmonary:      Effort: Pulmonary effort is normal.      Breath sounds: Decreased breath sounds and wheezing present.   Abdominal:      General: Bowel sounds are normal.      Palpations: Abdomen is soft.   Musculoskeletal:         General: Normal range of motion.      Cervical back: Normal range of motion and neck supple.   Skin:     General: Skin is warm and dry.      Capillary Refill: Capillary refill takes less than 2 seconds.   Neurological:      Mental Status: He is alert.      GCS: GCS eye subscore is 4. GCS verbal subscore is 5. GCS motor subscore is 6.      Sensory: Sensation is intact.      Motor: Motor function is intact.   Psychiatric:         Attention and Perception: Attention and perception normal.         Mood and Affect: Mood and affect normal.         Speech: Speech normal.                           Procedures    ED Course:    ECG 12 Lead ED Triage Standing Order; SOA   Final Result          ED Course as of 02/10/25 1925   Sun Feb 09, 2025   1253 EKG: I reviewed and independently interpreted the EKG as:  Sinus rhythm of 80 bpm, normal axis, normal intervals, no ST elevation, lateral T wave inversions [CS]   1357 Spoke with md Karyna, cardiology on-call about patient's bump in troponin versus previous hospitalization.  Dr. Anglin is requesting admission to the hospital for observation, trending troponins. [KH]   1538 EKG independently interpreted by myself on 2/9/2025 at 1534 shows sinus rhythm, PVCs, T wave inversions in inferior and lateral leads.  No  acute change compared to previous EKG performed today. [NS]      ED Course User Index  [CS] Michael Hernandez MD  [KH] Logan Sewell APRN  [NS] Aniya aSeed MD       Orders placed during this visit:    Orders Placed This Encounter   Procedures    Respiratory Panel PCR w/COVID-19(SARS-CoV-2) ANGEL/MATTHEW/JOSE/PAD/COR/ELIO In-House, NP Swab in UTM/VTM, 2 HR TAT - Swab, Nasopharynx    XR Chest 1 View    [place] Draw    Comprehensive Metabolic Panel    BNP    High Sensitivity Troponin T    CBC Auto Differential    High Sensitivity Troponin T 1Hr    NPO Diet NPO Type: Strict NPO    Undress & Gown    Continuous Pulse Oximetry    Vital Signs    Oxygen Therapy- Nasal Cannula; Titrate 1-6 LPM Per SpO2; 90 - 95%    ECG 12 Lead ED Triage Standing Order; SOA    Insert Peripheral IV    CBC & Differential    Green Top (Gel)    Lavender Top    Gold Top - SST    Light Blue Top       LAB Results:    Lab Results (last 24 hours)       Procedure Component Value Units Date/Time    CBC & Differential [[id number]]  (Abnormal) Collected: 02/09/25 1248    Specimen: Blood Updated: 02/09/25 1309    Narrative:      The following orders were created for panel order CBC & Differential.  Procedure                               Abnormality         Status                     ---------                               -----------         ------                     CBC Auto Differential[[id number]]        Abnormal            Final result                 Please view results for these tests on the individual orders.    Comprehensive Metabolic Panel [[id number]]  (Abnormal) Collected: 02/09/25 1248    Specimen: Blood Updated: 02/09/25 1327     Glucose 195 mg/dL      Comment: Glucose >180, Hemoglobin A1C recommended.        BUN 28 mg/dL      Creatinine 1.47 mg/dL      Sodium 135 mmol/L      Potassium 4.3 mmol/L      Chloride 100 mmol/L      CO2 26.1 mmol/L      Calcium 9.0 mg/dL      Total Protein 6.3 g/dL      Albumin 3.5 g/dL      ALT (SGPT) 25  U/L      AST (SGOT) 42 U/L      Alkaline Phosphatase 39 U/L      Total Bilirubin 0.4 mg/dL      Globulin 2.8 gm/dL      A/G Ratio 1.3 g/dL      BUN/Creatinine Ratio 19.0     Anion Gap 8.9 mmol/L      eGFR 47.3 mL/min/1.73     Narrative:      GFR Categories in Chronic Kidney Disease (CKD)      GFR Category          GFR (mL/min/1.73)    Interpretation  G1                     90 or greater         Normal or high (1)  G2                      60-89                Mild decrease (1)  G3a                   45-59                Mild to moderate decrease  G3b                   30-44                Moderate to severe decrease  G4                    15-29                Severe decrease  G5                    14 or less           Kidney failure          (1)In the absence of evidence of kidney disease, neither GFR category G1 or G2 fulfill the criteria for CKD.    eGFR calculation 2021 CKD-EPI creatinine equation, which does not include race as a factor    BNP [[id number]]  (Abnormal) Collected: 02/09/25 1248    Specimen: Blood Updated: 02/09/25 1326     proBNP 12,953.0 pg/mL     Narrative:      This assay is used as an aid in the diagnosis of individuals suspected of having heart failure. It can be used as an aid in the diagnosis of acute decompensated heart failure (ADHF) in patients presenting with signs and symptoms of ADHF to the emergency department (ED). In addition, NT-proBNP of <300 pg/mL indicates ADHF is not likely.    Age Range Result Interpretation  NT-proBNP Concentration (pg/mL:      <50             Positive            >450                   Gray                 300-450                    Negative             <300    50-75           Positive            >900                  Gray                300-900                  Negative            <300      >75             Positive            >1800                  Gray                300-1800                  Negative            <300    High Sensitivity Troponin T  [[id number]]  (Abnormal) Collected: 02/09/25 1248    Specimen: Blood Updated: 02/09/25 1327     HS Troponin T 768 ng/L     Narrative:      High Sensitive Troponin T Reference Range:  <14.0 ng/L- Negative Female for AMI  <22.0 ng/L- Negative Male for AMI  >=14 - Abnormal Female indicating possible myocardial injury.  >=22 - Abnormal Male indicating possible myocardial injury.   Clinicians would have to utilize clinical acumen, EKG, Troponin, and serial changes to determine if it is an Acute Myocardial Infarction or myocardial injury due to an underlying chronic condition.         CBC Auto Differential [[id number]]  (Abnormal) Collected: 02/09/25 1248    Specimen: Blood Updated: 02/09/25 1309     WBC 3.82 10*3/mm3      RBC 4.22 10*6/mm3      Hemoglobin 12.9 g/dL      Hematocrit 37.6 %      MCV 89.1 fL      MCH 30.6 pg      MCHC 34.3 g/dL      RDW 13.2 %      RDW-SD 42.9 fl      MPV 9.8 fL      Platelets 279 10*3/mm3      Neutrophil % 67.1 %      Lymphocyte % 17.5 %      Monocyte % 12.3 %      Eosinophil % 1.8 %      Basophil % 0.5 %      Immature Grans % 0.8 %      Neutrophils, Absolute 2.56 10*3/mm3      Lymphocytes, Absolute 0.67 10*3/mm3      Monocytes, Absolute 0.47 10*3/mm3      Eosinophils, Absolute 0.07 10*3/mm3      Basophils, Absolute 0.02 10*3/mm3      Immature Grans, Absolute 0.03 10*3/mm3      nRBC 0.0 /100 WBC     Respiratory Panel PCR w/COVID-19(SARS-CoV-2) ANGEL/MATTHEW/JOSE/PAD/COR/ELIO In-House, NP Swab in UTM/VTM, 2 HR TAT - Swab, Nasopharynx [[id number]]  (Abnormal) Collected: 02/09/25 1254    Specimen: Swab from Nasopharynx Updated: 02/09/25 1347     ADENOVIRUS, PCR Not Detected     Coronavirus 229E Not Detected     Coronavirus HKU1 Not Detected     Coronavirus NL63 Not Detected     Coronavirus OC43 Not Detected     COVID19 Not Detected     Human Metapneumovirus Not Detected     Human Rhinovirus/Enterovirus Not Detected     Influenza A H1 2009 PCR Detected     Influenza B PCR Not Detected     Parainfluenza  Virus 1 Not Detected     Parainfluenza Virus 2 Not Detected     Parainfluenza Virus 3 Not Detected     Parainfluenza Virus 4 Not Detected     RSV, PCR Not Detected     Bordetella pertussis pcr Not Detected     Bordetella parapertussis PCR Not Detected     Chlamydophila pneumoniae PCR Not Detected     Mycoplasma pneumo by PCR Not Detected    Narrative:      In the setting of a positive respiratory panel with a viral infection PLUS a negative procalcitonin without other underlying concern for bacterial infection, consider observing off antibiotics or discontinuation of antibiotics and continue supportive care. If the respiratory panel is positive for atypical bacterial infection (Bordetella pertussis, Chlamydophila pneumoniae, or Mycoplasma pneumoniae), consider antibiotic de-escalation to target atypical bacterial infection.    High Sensitivity Troponin T 1Hr [[id number]]  (Abnormal) Collected: 02/09/25 1351    Specimen: Blood Updated: 02/09/25 1427     HS Troponin T 787 ng/L      Troponin T Numeric Delta 19 ng/L      Troponin T % Delta 2    Narrative:      High Sensitive Troponin T Reference Range:  <14.0 ng/L- Negative Female for AMI  <22.0 ng/L- Negative Male for AMI  >=14 - Abnormal Female indicating possible myocardial injury.  >=22 - Abnormal Male indicating possible myocardial injury.   Clinicians would have to utilize clinical acumen, EKG, Troponin, and serial changes to determine if it is an Acute Myocardial Infarction or myocardial injury due to an underlying chronic condition.                  If labs were ordered, I have independently reviewed the results and considered them in the diagnosis and treatment plan for the patient    RADIOLOGY    XR Chest 1 View    Result Date: 2/9/2025  PROCEDURE: XR CHEST 1 VW-  HISTORY: SOA Triage Protocol  COMPARISON: 2/4/2025  FINDINGS:  Portable view of the chest demonstrates pulmonary edema has improved. Pulmonary vascular congestion is noted. Small pleural effusions are  seen. There is no pneumothorax. The mediastinum is unremarkable.  The heart size is normal.      Impression: Mild CHF, significantly improved    This report was signed and finalized on 2/9/2025 1:10 PM by Eric Soria MD.        If I have ordered, I have independently reviewed the above noted radiographic studies.  Please see the radiologist dictation for the official interpretation    Medications given to patient in the ER    Medications   sodium chloride 0.9 % flush 10 mL (has no administration in time range)       AS OF 15:22 EST VITALS:    BP - 156/77  HR - 68  TEMP - 97.8 °F (36.6 °C) (Oral)  O2 SATS - 96%         Shared Decision Making: After my consideration of the clinical presentation and laboratory/radiology studies obtained, I have discussed the findings with the patient/patient representative who is in agreement with the treatment plan and final disposition. Risks and benefits of discharge and/or observation admission were discussed.  Final disposition of the patient will be admitted to the hospital.  Patient is requested to follow-up with primary care provider and specialist in 1 week following final discharge.      Medical Decision Making  Abebe Crockett is a 84 y.o. male who presents to the ER complaining of shortness of breath that started earlier today.  Patient was recently admitted to the hospital for acute respiratory failure with hypoxia was discharged from the hospital on 2-5-2025.  Patient states he was diagnosed with influenza earlier this week and has had worsening shortness of breath..    DDX: includes but is not limited to: COVID-19, influenza, viral respiratory illness, pneumonia, NSTEMI, STEMI, chest pain unspecified, COPD exacerbation, other    Problems Addressed:  Elevated troponin: complicated acute illness or injury  Influenza A: complicated acute illness or injury    Amount and/or Complexity of Data Reviewed  External Data Reviewed: labs, radiology, ECG and notes.     Details: I  have personally reviewed labs, radiology EKG and notes from patient's chart  Labs: ordered.     Details: I have personally reviewed and documented all results  Radiology: ordered. Decision-making details documented in ED Course.     Details: I have personally reviewed and documented all results  ECG/medicine tests: ordered.     Details: I have personally reviewed and documented all results  Discussion of management or test interpretation with external provider(s): Discussed assessment, treatment and plan with ER attending, hospital    Risk  Prescription drug management.  Decision regarding hospitalization.  Risk Details: I have discussed with patient the finding of the test preformed today. Patient has been diagnosed with influenza A, elevated troponin and will be admitted to the hospital.             Final diagnoses:   Influenza A   Elevated troponin       Please note that portions of this document were completed using voice recognition dictation software.       Logan Sewell, LASHAUN  02/10/25 1925       Logan Sewell, LASHAUN  02/10/25 1925

## 2025-02-09 NOTE — PHARMACY RECOMMENDATION
Pharmacy to Dose Heparin Infusion    Abebe Crockett is a  84 y.o. male receiving heparin infusion.     Therapy for (VTE/Cardiac):   cardiac  Patient Weight: 74.4 kg  Initial Bolus (Y/N):   Y  Any Bolus (Y/N):   Y        Signs or Symptoms of Bleeding: N    Cardiac or Other (Not VTE)   Initial Bolus: 60 units/kg (Max 4,000 units)  Initial rate: 12 units/kg/hr (Max 1,000 units/hr)   Anti Xa Rebolus Infusion Hold time Change infusion Dose (Units/kg/hr) Next Anti Xa or aPTT Level Due   < 0.1 50 Units/kg  (4000 Units Max) None Increase by  3 Units/kg/hr 6 hours   0.1- 0.19 25 Units/kg  (2000 Units Max) None Increase by  2 Units/kg/hr 6 hours   0.2 - 0.29 0 None Increase by  1 Units/kg/hr 6 hours   0.3 - 0.5 0 None No Change 6 hours (after 2 consecutive levels in range check qAM)   0.51 - 0.6 0 None Decrease by  1 Units/kg/hr 6 hours   0.61 - 0.8 0 30 Minutes Decrease by  2 Units/kg/hr 6 hours   0.81 - 1 0 60 Minutes Decrease by  3 Units/kg/hr 6 hours   >1 0 Hold  After Anti Xa less than 0.5 decrease previous rate by  4 Units/kg/hr  Every 2 hours until Anti Xa  less than 0.5 then when infusion restarts in 6 hours       Recommend anti-Xa every 6 hours.         Lab 02/09/25  1607 02/09/25  1248 02/06/25  0443 02/05/25  0536 02/04/25  2131 02/03/25  0908   HEMOGLOBIN  --  12.9* 11.7* 12.0* 14.3 12.8*   HEMATOCRIT  --  37.6 33.1* 34.3* 40.8 37.9   PLATELETS  --  279 241 262 337 325   PROTIME  --  14.1  --   --   --   --    APTT  --  29.1*  --   --   --   --    HEPARIN ANTI-XA 0.10*  --   --   --   --   --    CREATININE  --  1.47* 1.69* 1.35* 1.36* 1.41*   EGFR  --  47.3* 40.0* 52.4* 52.0* 49.8*          Date   Time   Anti-Xa Current Rate (Unit/kg/hr) Bolus   (Units) Rate Change   (Unit/kg/hr) New Rate (Unit/kg/hr) Next   Anti-Xa Comments  Pump Check Daily   2/9 1700  0 4000 +12.0 12.0 2/9 2300 d/w Tasha Martinez RN                                                                                                                                                                                                                                  Pharmacy will continue to follow anti-Xa results and monitor for signs and symptoms of bleeding or thrombosis.      Thank you for the opportunity to consult on this patient.    Ian Cruz RPH, Pharm.D.  02/09/25  17:32 EST

## 2025-02-10 LAB
ANION GAP SERPL CALCULATED.3IONS-SCNC: 10.4 MMOL/L (ref 5–15)
BASOPHILS # BLD AUTO: 0.02 10*3/MM3 (ref 0–0.2)
BASOPHILS NFR BLD AUTO: 0.7 % (ref 0–1.5)
BUN SERPL-MCNC: 28 MG/DL (ref 8–23)
BUN/CREAT SERPL: 19.6 (ref 7–25)
CALCIUM SPEC-SCNC: 8.6 MG/DL (ref 8.6–10.5)
CHLORIDE SERPL-SCNC: 99 MMOL/L (ref 98–107)
CO2 SERPL-SCNC: 27.6 MMOL/L (ref 22–29)
CREAT SERPL-MCNC: 1.43 MG/DL (ref 0.76–1.27)
DEPRECATED RDW RBC AUTO: 43 FL (ref 37–54)
EGFRCR SERPLBLD CKD-EPI 2021: 48.9 ML/MIN/1.73
EOSINOPHIL # BLD AUTO: 0.05 10*3/MM3 (ref 0–0.4)
EOSINOPHIL NFR BLD AUTO: 1.7 % (ref 0.3–6.2)
ERYTHROCYTE [DISTWIDTH] IN BLOOD BY AUTOMATED COUNT: 13.2 % (ref 12.3–15.4)
GLUCOSE SERPL-MCNC: 100 MG/DL (ref 65–99)
HCT VFR BLD AUTO: 35.1 % (ref 37.5–51)
HGB BLD-MCNC: 12.1 G/DL (ref 13–17.7)
IMM GRANULOCYTES # BLD AUTO: 0.01 10*3/MM3 (ref 0–0.05)
IMM GRANULOCYTES NFR BLD AUTO: 0.3 % (ref 0–0.5)
LYMPHOCYTES # BLD AUTO: 0.87 10*3/MM3 (ref 0.7–3.1)
LYMPHOCYTES NFR BLD AUTO: 28.9 % (ref 19.6–45.3)
MAGNESIUM SERPL-MCNC: 2 MG/DL (ref 1.6–2.4)
MCH RBC QN AUTO: 30.6 PG (ref 26.6–33)
MCHC RBC AUTO-ENTMCNC: 34.5 G/DL (ref 31.5–35.7)
MCV RBC AUTO: 88.6 FL (ref 79–97)
MONOCYTES # BLD AUTO: 0.44 10*3/MM3 (ref 0.1–0.9)
MONOCYTES NFR BLD AUTO: 14.6 % (ref 5–12)
NEUTROPHILS NFR BLD AUTO: 1.62 10*3/MM3 (ref 1.7–7)
NEUTROPHILS NFR BLD AUTO: 53.8 % (ref 42.7–76)
NRBC BLD AUTO-RTO: 0 /100 WBC (ref 0–0.2)
PHOSPHATE SERPL-MCNC: 3.5 MG/DL (ref 2.5–4.5)
PLATELET # BLD AUTO: 230 10*3/MM3 (ref 140–450)
PMV BLD AUTO: 9.7 FL (ref 6–12)
POTASSIUM SERPL-SCNC: 4 MMOL/L (ref 3.5–5.2)
RBC # BLD AUTO: 3.96 10*6/MM3 (ref 4.14–5.8)
SODIUM SERPL-SCNC: 137 MMOL/L (ref 136–145)
UFH PPP CHRO-ACNC: 0.33 IU/ML (ref 0.3–0.7)
UFH PPP CHRO-ACNC: 0.34 IU/ML (ref 0.3–0.7)
WBC NRBC COR # BLD AUTO: 3.01 10*3/MM3 (ref 3.4–10.8)

## 2025-02-10 PROCEDURE — 84100 ASSAY OF PHOSPHORUS: CPT | Performed by: FAMILY MEDICINE

## 2025-02-10 PROCEDURE — 94660 CPAP INITIATION&MGMT: CPT

## 2025-02-10 PROCEDURE — 25010000002 HEPARIN (PORCINE) PER 1000 UNITS: Performed by: FAMILY MEDICINE

## 2025-02-10 PROCEDURE — 99222 1ST HOSP IP/OBS MODERATE 55: CPT | Performed by: INTERNAL MEDICINE

## 2025-02-10 PROCEDURE — 25010000002 MORPHINE PER 10 MG: Performed by: FAMILY MEDICINE

## 2025-02-10 PROCEDURE — 85520 HEPARIN ASSAY: CPT | Performed by: FAMILY MEDICINE

## 2025-02-10 PROCEDURE — 99232 SBSQ HOSP IP/OBS MODERATE 35: CPT | Performed by: NURSE PRACTITIONER

## 2025-02-10 PROCEDURE — 83735 ASSAY OF MAGNESIUM: CPT | Performed by: FAMILY MEDICINE

## 2025-02-10 PROCEDURE — 94799 UNLISTED PULMONARY SVC/PX: CPT

## 2025-02-10 PROCEDURE — 80048 BASIC METABOLIC PNL TOTAL CA: CPT | Performed by: FAMILY MEDICINE

## 2025-02-10 PROCEDURE — 25010000002 FUROSEMIDE PER 20 MG: Performed by: FAMILY MEDICINE

## 2025-02-10 PROCEDURE — 25010000002 FUROSEMIDE PER 20 MG: Performed by: NURSE PRACTITIONER

## 2025-02-10 PROCEDURE — 85025 COMPLETE CBC W/AUTO DIFF WBC: CPT | Performed by: FAMILY MEDICINE

## 2025-02-10 PROCEDURE — 85520 HEPARIN ASSAY: CPT

## 2025-02-10 RX ORDER — IPRATROPIUM BROMIDE AND ALBUTEROL SULFATE 2.5; .5 MG/3ML; MG/3ML
3 SOLUTION RESPIRATORY (INHALATION)
Status: DISCONTINUED | OUTPATIENT
Start: 2025-02-10 | End: 2025-02-10

## 2025-02-10 RX ORDER — IPRATROPIUM BROMIDE AND ALBUTEROL SULFATE 2.5; .5 MG/3ML; MG/3ML
3 SOLUTION RESPIRATORY (INHALATION)
Status: DISCONTINUED | OUTPATIENT
Start: 2025-02-10 | End: 2025-02-11

## 2025-02-10 RX ORDER — ONDANSETRON 4 MG/1
4 TABLET, ORALLY DISINTEGRATING ORAL EVERY 6 HOURS PRN
Status: DISCONTINUED | OUTPATIENT
Start: 2025-02-10 | End: 2025-02-12

## 2025-02-10 RX ORDER — PREDNISONE 20 MG/1
40 TABLET ORAL
Status: DISPENSED | OUTPATIENT
Start: 2025-02-11 | End: 2025-02-16

## 2025-02-10 RX ORDER — AMLODIPINE BESYLATE 5 MG/1
5 TABLET ORAL DAILY
COMMUNITY
End: 2025-02-17 | Stop reason: HOSPADM

## 2025-02-10 RX ORDER — FUROSEMIDE 10 MG/ML
40 INJECTION INTRAMUSCULAR; INTRAVENOUS
Status: DISCONTINUED | OUTPATIENT
Start: 2025-02-10 | End: 2025-02-12

## 2025-02-10 RX ORDER — ACETAMINOPHEN 325 MG/1
650 TABLET ORAL EVERY 6 HOURS PRN
Status: DISCONTINUED | OUTPATIENT
Start: 2025-02-10 | End: 2025-02-17 | Stop reason: HOSPADM

## 2025-02-10 RX ORDER — ONDANSETRON 2 MG/ML
4 INJECTION INTRAMUSCULAR; INTRAVENOUS EVERY 6 HOURS PRN
Status: DISCONTINUED | OUTPATIENT
Start: 2025-02-10 | End: 2025-02-12

## 2025-02-10 RX ORDER — CARVEDILOL 3.12 MG/1
3.12 TABLET ORAL 2 TIMES DAILY WITH MEALS
Status: DISCONTINUED | OUTPATIENT
Start: 2025-02-10 | End: 2025-02-17 | Stop reason: HOSPADM

## 2025-02-10 RX ADMIN — FUROSEMIDE 40 MG: 10 INJECTION, SOLUTION INTRAMUSCULAR; INTRAVENOUS at 17:49

## 2025-02-10 RX ADMIN — FUROSEMIDE 20 MG: 10 INJECTION, SOLUTION INTRAMUSCULAR; INTRAVENOUS at 09:59

## 2025-02-10 RX ADMIN — ASPIRIN 81 MG: 81 TABLET, COATED ORAL at 20:49

## 2025-02-10 RX ADMIN — MORPHINE SULFATE 1 MG: 2 INJECTION, SOLUTION INTRAMUSCULAR; INTRAVENOUS at 22:39

## 2025-02-10 RX ADMIN — FENOFIBRATE 145 MG: 145 TABLET, FILM COATED ORAL at 20:49

## 2025-02-10 RX ADMIN — HEPARIN SODIUM 11 UNITS/KG/HR: 10000 INJECTION, SOLUTION INTRAVENOUS at 15:59

## 2025-02-10 RX ADMIN — EMPAGLIFLOZIN 10 MG: 10 TABLET, FILM COATED ORAL at 13:09

## 2025-02-10 RX ADMIN — CARVEDILOL 3.12 MG: 3.12 TABLET, FILM COATED ORAL at 17:50

## 2025-02-10 RX ADMIN — CLOPIDOGREL BISULFATE 75 MG: 75 TABLET ORAL at 13:09

## 2025-02-10 RX ADMIN — Medication 10 ML: at 09:59

## 2025-02-10 RX ADMIN — Medication 10 ML: at 20:51

## 2025-02-10 RX ADMIN — IPRATROPIUM BROMIDE AND ALBUTEROL SULFATE 3 ML: 2.5; .5 SOLUTION RESPIRATORY (INHALATION) at 19:07

## 2025-02-10 NOTE — BRIEF OP NOTE
Discharge Planning Assessment   [facility]     Patient Name: Abebe Crockett  MRN: [id number]  Today's Date: 2/10/2025    Admit Date: 2/9/2025    Plan: home with family   Discharge Needs Assessment       Row Name 02/10/25 1129       Living Environment    People in Home spouse    Name(s) of People in Home Angeli Crockett spouse    Current Living Arrangements home    Potentially Unsafe Housing Conditions none    In the past 12 months has the electric, gas, oil, or water company threatened to shut off services in your home? No    Primary Care Provided by self    Family Caregiver if Needed spouse    Quality of Family Relationships involved    Able to Return to Prior Arrangements yes       Resource/Environmental Concerns    Resource/Environmental Concerns none    Transportation Concerns none       Transportation Needs    In the past 12 months, has lack of transportation kept you from medical appointments or from getting medications? no    In the past 12 months, has lack of transportation kept you from meetings, work, or from getting things needed for daily living? No       Food Insecurity    Within the past 12 months, you worried that your food would run out before you got the money to buy more. Never true    Within the past 12 months, the food you bought just didn't last and you didn't have money to get more. Never true       Transition Planning    Patient/Family Anticipates Transition to home with family    Patient/Family Anticipated Services at Transition none    Transportation Anticipated car, drives self       Discharge Needs Assessment    Readmission Within the Last 30 Days current reason for admission unrelated to previous admission    Equipment Currently Used at Home cpap;bp cuff;grab bar    Concerns to be Addressed medication    Do you want help finding or keeping work or a job? I do not need or want help    Do you want help with school or training? For example, starting or completing job training or getting a high  school diploma, GED or equivalent No    Anticipated Changes Related to Illness none    Equipment Needed After Discharge none    Outpatient/Agency/Support Group Needs outpatient therapy                   Discharge Plan       Row Name 02/10/25 1131       Plan    Plan home with family    Plan Comments independent of ADL's, lives with spouse   Angeli Crockett  [phone],  no oxygen  but has Cpap through [facility],  no home health,  has durable power of   and possible  advance directivess  not  on file.   Pt   normally drives self.   Get meds from [facility].   Having problems with [employer]   co pya  is over 400 dollars.   Plans on returning home  at disharge.  Does not think needs home healt.  spouse will transport.                  Continued Care and Services - Admitted Since 2/9/2025    No active coordination exists for this encounter.          Demographic Summary       Row Name 02/10/25 1123       General Information    Admission Type inpatient    Arrived From emergency department    Required Notices Provided Important Message from Medicare    Referral Source admission list    Reason for Consult discharge planning    Preferred Language English                   Functional Status       Row Name 02/10/25 1124       Functional Status    Usual Activity Tolerance moderate    Current Activity Tolerance moderate       Physical Activity    On average, how many days per week do you engage in moderate to strenuous exercise (like a brisk walk)? 0 days    On average, how many minutes do you engage in exercise at this level? 0 min    Number of minutes of exercise per week 0       Assessment of Health Literacy    How often do you have someone help you read hospital materials? Occasionally    How often do you have problems learning about your medical condition because of difficulty understanding written information? Occasionally    How often do you have a problem understanding what is told to you about your medical  condition? Occasionally    How confident are you filling out medical forms by yourself? Quite a bit    Health Literacy Good       Functional Status, IADL    Medications independent    Meal Preparation independent    Housekeeping independent    Laundry independent    Shopping independent       Mental Status    General Appearance WDL WDL       Mental Status Summary    Recent Changes in Mental Status/Cognitive Functioning no changes       Employment/    Employment Status retired                   Psychosocial       Row Name 02/10/25 1125       Behavior WDL    Behavior WDL WDL       Mental Health    Little interest or pleasure in doing things Not at all    Feeling down, depressed, or hopeless Not at all       Stress    Do you feel stress - tense, restless, nervous, or anxious, or unable to sleep at night because your mind is troubled all the time - these days? Only a littl       Coping/Stress    Major Change/Loss/Stressor none                   Abuse/Neglect    No documentation.                  Legal       Row Name 02/10/25 1126       Financial Resource Strain    How hard is it for you to pay for the very basics like food, housing, medical care, and heating? Not hard       Financial/Legal    Source of Income social security    Financial/Environmental Concerns none                   Substance Abuse    No documentation.                  Patient Forms    No documentation.                     Ariella Bhagat RN

## 2025-02-10 NOTE — CONSULTS
Consults    Patient Identification:    Name:  Abebe Crockett  Age:  84 y.o.  Sex:  male  :  1940  MRN:  [id number]  Visit Number:  [id number]  Primary care provider:  Dre Henry MD    Chief complaint:   1.  Elevated troponin  2.  LV dysfunction    Patient's acute and chronic issues:  1.  Influenza A with bronchitis (2025)  2.  Cardiomyopathy  - LVEF 30-35% (echo 2025)  3.  Systolic heart failure (newly diagnosed)  4.  HTN  5.  HLD: , , HDL 27,  (2025)  6.  DM2  7.  CKD: Creatinine 1.43/GFR 48.9 (2/10/2025)  - Primary cardiologist: Dr. Myles    History of presenting illness:     Follow-up for elevated troponin and LV dysfunction.  - Patient was seen by Dr. Anglin over the weekend.    Patient had presented with shortness of breath.  - He was also admitted last week with similar symptoms.  - Since then he has been found to be positive for influenza A with bronchitis.  - Initially he required BiPAP, has since transition to nasal cannula.    His echo from last week shows LVEF 30-35%.  - This is a new finding since his 2019 study when his LVEF was intact.  - His troponin has also been found to be elevated.  Peak was 787, since then trended down to 689.    Clinically he reports his shortness of breath is better.  - Denies any chest pain whatsoever.  Denies any recent history of chest pain at rest or with exertion.  - No significant leg edema.  - Does report orthopnea.    Patient has no previous cardiac ischemic workup (stress or) in our EMR.      Assessment and plan:  1.  Elevated troponin  - Patient denies any anginal symptoms.  - Elevated troponin can be due to ACS versus related to his LV dysfunction versus related to his influenza.  - Given his chest pain-free no urgent need for coronary angiogram.  However given his new LV dysfunction patient will need ischemic assessment: Will plan for it once patient is further diuresed.    2.  Influenza A, bronchitis  - Plan per medicine  team    3.  Cardiomyopathy: LVEF 30-35%  - This is a recent diagnosis  - Etiology not entirely clear, can be hypertensive myopathy.  - Ischemic etiology will need to be excluded, will plan for cardiac cath in the next 24 to 48 hours.  - Continue guideline directed medical therapy  - BB: Coreg 6.25 twice daily  - ARNI: Entresto once he has been off ACE-I/Lisinopril 40 (for 36 hrs),   - SGLT2 inhibitor: Jardiance 10  - MRA: Aldactone next if allowed by BP and renal function.  - Diuretic: Lasix 40 IV twice daily.    4.  Systolic heart failure, acute  - See discussion under #2    5.  HTN  - BP target less than 130/80  - On beta-blocker and ARNI  - Prefer to avoid calcium channel blocker given his LV dysfunction.    6.  HLD  - Patient is only on fenofibrate, triglyceride level is acceptable  - His LDL is elevated, will benefit from statin.    7.  DM2  - Plan per medicine team    8.  CKD  - GFR 48.9  - Continue to monitor closely as patient is being diuresed.    ================================================================================    Cardiac and pertinent studies.  EKG  - 2/9/2025: NSR 88.  Frequent PVC.  Subtle ST depression in lead V5 and V6  Echo  - 2/5/2025: LVEF 30-35%, global hypokinesis.  Grade 2 DD.  RV SF normal.  No AV stenosis/regurgitation.  No MV stenosis/trace regurgitation.  - 12/20/2019: LVEF 65-70%, grade 1 DD F.  Mild LVH.  RV SF normal.  Stress test: None  Cardiac cath: None  Chest CT  - 2//2025: No PE.  Findings suggestive of heart failure.    ---------------------------------------------------------------------------------------------------------------------  Review of Systems   ---------------------------------------------------------------------------------------------------------------------   Past History:   Family History   Problem Relation Age of Onset    Cancer Father      Past Medical History:   Diagnosis Date    Acute systolic heart failure 02/06/2025    Diabetes mellitus      Hypertension     Sleep apnea     CPAP COMPLIANT     Past Surgical History:   Procedure Laterality Date    COLONOSCOPY       Social History     Socioeconomic History    Marital status:    Tobacco Use    Smoking status: Former     Current packs/day: 0.00     Types: Cigarettes     Quit date: 1965     Years since quittin.1     Passive exposure: Never    Smokeless tobacco: Never   Vaping Use    Vaping status: Never Used   Substance and Sexual Activity    Alcohol use: No    Drug use: No    Sexual activity: Defer     ---------------------------------------------------------------------------------------------------------------------   Allergies:  Atorvastatin  ---------------------------------------------------------------------------------------------------------------------   Prior to Admission Medications       Prescriptions Last Dose Informant Patient Reported? Taking?    ASPIRIN 81 PO 2025  Yes Yes    Take 81 mg by mouth Every Night.    carvedilol (COREG) 6.25 MG tablet 2025  No Yes    Take 1 tablet by mouth 2 (Two) Times a Day With Meals for 30 days.    empagliflozin (Jardiance) 10 MG tablet tablet 2025  No Yes    Take 1 tablet by mouth Daily for 30 days.    fenofibrate (TRICOR) 145 MG tablet 2025 Medication Bottle Yes Yes    Take 1 tablet by mouth Every Night.    lisinopril (PRINIVIL,ZESTRIL) 40 MG tablet 2025  No Yes    Take 1 tablet by mouth once daily    Patient taking differently:  Take 1 tablet by mouth Every Night.    metFORMIN (GLUCOPHAGE) 500 MG tablet 2025  Yes Yes    Take 2 tablets by mouth 2 (Two) Times a Day With Meals.    oseltamivir (TAMIFLU) 30 MG capsule 2025  No Yes    Take 1 capsule by mouth Every 12 (Twelve) Hours for 6 doses. Indications: Influenza A Virus Infection    vitamin D3 125 MCG (5000 UT) capsule capsule 2025  Yes Yes    Take 1 capsule by mouth Daily.    amLODIPine (NORVASC) 5 MG tablet 2025  Yes No    Take 1 tablet by mouth Daily.     clotrimazole-betamethasone (LOTRISONE) 1-0.05 % cream Not Taking  Yes No    Apply 1 Application topically to the appropriate area as directed 2 (Two) Times a Day.    Patient not taking:  Reported on 2/10/2025          Hospital Meds:  aspirin, 81 mg, Oral, Nightly  carvedilol, 3.125 mg, Oral, BID With Meals  clopidogrel, 75 mg, Oral, Daily  [Held by provider] empagliflozin, 10 mg, Oral, Daily  fenofibrate, 145 mg, Oral, Nightly  furosemide, 40 mg, Intravenous, BID Diuretics  ipratropium-albuterol, 3 mL, Nebulization, 4x Daily - RT  [Held by provider] lisinopril, 40 mg, Oral, Nightly  [START ON 2/11/2025] predniSONE, 40 mg, Oral, Daily With Breakfast  senna-docusate sodium, 2 tablet, Oral, BID  sodium chloride, 10 mL, Intravenous, Q12H      heparin, 11 Units/kg/hr, Last Rate: 11 Units/kg/hr (02/10/25 0620)  Pharmacy to Dose Heparin,       ---------------------------------------------------------------------------------------------------------------------   Vital Signs:  Temp:  [97.3 °F (36.3 °C)-98 °F (36.7 °C)] 97.3 °F (36.3 °C)  Heart Rate:  [52-87] 59  Resp:  [16-24] 20  BP: (149-184)/(61-94) 173/61      02/09/25  1223 02/09/25  1556   Weight: 74.4 kg (164 lb) 74.5 kg (164 lb 3.9 oz)     Body mass index is 25.72 kg/m².  ---------------------------------------------------------------------------------------------------------------------   Physical exam:     Physical exam:  Constitutional:    HENT:  Head:  Normocephalic and atraumatic.    Eyes:  No scleral icterus.    Neck:  No JVD present.    Cardiovascular: Regular rhythm, S1 S2+, NO S3 / S4  Pulmonary/Chest:  Breath sounds B/L  Abdominal:  Soft.  No distension and no tenderness.   Neurological:  Alert and oriented x 3. No focal deficits.  Skin:  Skin is warm and dry.   ---------------------------------------------------------------------------------------------------------------------     Results for orders placed during the hospital encounter of 02/04/25    Adult  "Transthoracic Echo Complete W/ Cont if Necessary Per Protocol    Interpretation Summary  1.  Mild left ventricular dilation with moderately reduced LV systolic function, LVEF 30-35%.  2.  Moderate global LV hypokinesis.  3.  Grade 2 diastolic dysfunction.  4.  Normal right ventricular size and systolic function by TAPSE.  5.  Severely increased left atrial volume index.  6.  Mild tricuspid regurgitation.  7.  Left pleural effusion.    ---------------------------------------------------------------------------------------------------------------------   Results from last 7 days   Lab Units 02/10/25  0557 02/09/25  1248 02/06/25  0443 02/05/25  0536 02/05/25  0012   LACTATE mmol/L  --   --   --   --  1.3   WBC 10*3/mm3 3.01* 3.82 6.28   < >  --    HEMOGLOBIN g/dL 12.1* 12.9* 11.7*   < >  --    HEMATOCRIT % 35.1* 37.6 33.1*   < >  --    MCV fL 88.6 89.1 87.3   < >  --    MCHC g/dL 34.5 34.3 35.3   < >  --    PLATELETS 10*3/mm3 230 279 241   < >  --    INR   --  1.04  --   --   --     < > = values in this interval not displayed.         Results from last 7 days   Lab Units 02/10/25  0557 02/09/25  1248 02/06/25  0443 02/05/25  0536 02/04/25  2131   SODIUM mmol/L 137 135* 132*   < > 132*   POTASSIUM mmol/L 4.0 4.3 3.6   < > 4.5   MAGNESIUM mg/dL 2.0  --   --   --   --    CHLORIDE mmol/L 99 100 94*   < > 97*   CO2 mmol/L 27.6 26.1 27.4   < > 25.5   BUN mg/dL 28* 28* 26*   < > 15   CREATININE mg/dL 1.43* 1.47* 1.69*   < > 1.36*   CALCIUM mg/dL 8.6 9.0 8.5*   < > 9.6   PHOSPHORUS mg/dL 3.5  --   --   --   --    GLUCOSE mg/dL 100* 195* 108*   < > 154*   ALBUMIN g/dL  --  3.5  --   --  4.2   BILIRUBIN mg/dL  --  0.4  --   --  0.4   ALK PHOS U/L  --  39  --   --  48   AST (SGOT) U/L  --  42*  --   --  34   ALT (SGPT) U/L  --  25  --   --  20    < > = values in this interval not displayed.   Estimated Creatinine Clearance: 42 mL/min (A) (by C-G formula based on SCr of 1.43 mg/dL (H)).  No results found for: \"AMMONIA\"  Results " "from last 7 days   Lab Units 02/09/25  1955 02/09/25  1351 02/09/25  1248   HSTROP T ng/L 689* 787* 768*     Results from last 7 days   Lab Units 02/09/25  1248 02/04/25  2131   PROBNP pg/mL 12,953.0* 14,205.0*     Lab Results   Component Value Date    HGBA1C 6.10 (H) 02/09/2025     Lab Results   Component Value Date    TSH 2.850 02/09/2025     No results found for: \"PREGTESTUR\", \"PREGSERUM\", \"HCG\", \"HCGQUANT\"  Pain Management Panel           No data to display              Blood Culture   Date Value Ref Range Status   02/04/2025 No growth at 5 days  Final   02/04/2025 No growth at 5 days  Final     No results found for: \"URINECX\"  No results found for: \"WOUNDCX\"  No results found for: \"STOOLCX\"  Results from last 7 days   Lab Units 02/09/25  1351   CHOLESTEROL mg/dL 189   TRIGLYCERIDES mg/dL 155*   HDL CHOL mg/dL 27*   LDL CHOL mg/dL 134*       ---------------------------------------------------------------------------------------------------------------------   Imaging Results (Last 7 Days)       Procedure Component Value Units Date/Time    XR Chest 1 View [[id number]] Collected: 02/09/25 1309     Updated: 02/09/25 1312    Narrative:      PROCEDURE: XR CHEST 1 VW-     HISTORY: SOA Triage Protocol     COMPARISON: 2/4/2025     FINDINGS:  Portable view of the chest demonstrates pulmonary edema has  improved. Pulmonary vascular congestion is noted. Small pleural  effusions are seen. There is no pneumothorax. The mediastinum is  unremarkable.     The heart size is normal.       Impression:      Mild CHF, significantly improved           This report was signed and finalized on 2/9/2025 1:10 PM by Eric Soria MD.             ----------------------------------------------------------------------------------------------------------------------  Assessment:   See discussion above      Plan:   See discussion above  Thank you for the consult.      Benjamin Le MD   02/10/25  14:07 EST           "

## 2025-02-10 NOTE — PROGRESS NOTES
[facility] HOSPITALIST    PROGRESS NOTE    Name:  Abebe Crockett   Age:  84 y.o.  Sex:  male  :  1940  MRN:  [id number]   Visit Number:  [id number]  Admission Date:  2025  Date Of Service:  02/10/25  Primary Care Physician:  Dre Henry MD     LOS: 0 days :    Chief Complaint:      Shortness of air    Subjective:    Patient seen and examined with wife and  at bedside.  Wife states discussed with family and patient and would like to continue full CODE STATUS at this time.  Was noted to have heart failure exacerbation and the flu 5 days prior with admission.  Advised due to elevated troponin cardiology would also be seeing today.    Hospital Course:    Mr. 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  Patient presented to emergency department due to worsening shortness of air.  Patient denied associated chest pain, nausea, vomiting, or jaw pain.  Worsening dyspnea noted with ambulation.    Upon ED presentation patient hypertensive with blood pressure 169/105, shortly thereafter requiring BiPAP due to severe dyspnea.  Pertinent labs and imaging noted initial troponin 768 with repeat 797 and then 689, creatinine 1.4 which is baseline, proBNP 12,000 with prior 14,000, chest x-ray noting mild CHF with significant improvement.  Hospitalist consulted for further medical management.  Cardiology consulted.  Patient initiated on heparin infusion/continued beta-blocker/aspirin.  Due to patient requiring BiPAP, currently able to lay flat for cardiac catheterization.  Cardiology suggested increasing Lasix for now and reevaluating tomorrow.  Patient continues to deny chest pain.    Review of Systems:     All systems were reviewed and negative except as  mentioned in subjective, assessment and plan.    Vital Signs:    Temp:  [97.3 °F (36.3 °C)-98 °F (36.7 °C)] 97.3 °F (36.3 °C)  Heart Rate:  [52-87] 59  Resp:  [16-24] 20  BP: (149-184)/(61-94) 173/61    Intake and output:    I/O last 3 completed shifts:  In: -   Out: 1400 [Urine:1400]  I/O this shift:  In: -   Out: 700 [Urine:700]    Physical Examination:    General Appearance:  Alert and cooperative.  Chronically ill-appearing middle-age male.   Head:  Atraumatic and normocephalic.   Eyes: Conjunctivae and sclerae normal, no icterus. No pallor.   Throat: No oral lesions, no thrush, oral mucosa moist.   Neck: Supple, trachea midline, no thyromegaly.   Lungs:   Breath sounds heard bilaterally equally.  On BiPAP, mild coarseness noted throughout with some slight dyspnea.   Heart:  Normal S1 and S2, no murmur, no gallop, no rub. No JVD.   Abdomen:   Normal bowel sounds, no masses, no organomegaly. Soft, nontender, nondistended, no rebound tenderness.   Extremities: Supple, no edema, no cyanosis, no clubbing.   Skin: No bleeding or rash.   Neurologic: Alert and oriented x 3. No facial asymmetry. Moves all four limbs. No tremors.  Generalized weakness.     Laboratory results:    Results from last 7 days   Lab Units 02/10/25  0557 02/09/25  1248 02/06/25  0443 02/05/25  0536 02/04/25  2131   SODIUM mmol/L 137 135* 132*   < > 132*   POTASSIUM mmol/L 4.0 4.3 3.6   < > 4.5   CHLORIDE mmol/L 99 100 94*   < > 97*   CO2 mmol/L 27.6 26.1 27.4   < > 25.5   BUN mg/dL 28* 28* 26*   < > 15   CREATININE mg/dL 1.43* 1.47* 1.69*   < > 1.36*   CALCIUM mg/dL 8.6 9.0 8.5*   < > 9.6   BILIRUBIN mg/dL  --  0.4  --   --  0.4   ALK PHOS U/L  --  39  --   --  48   ALT (SGPT) U/L  --  25  --   --  20   AST (SGOT) U/L  --  42*  --   --  34   GLUCOSE mg/dL 100* 195* 108*   < > 154*    < > = values in this interval not displayed.     Results from last 7 days   Lab Units 02/10/25  0557 02/09/25  1248 02/06/25  0443   WBC 10*3/mm3 3.01* 3.82 6.28  "  HEMOGLOBIN g/dL 12.1* 12.9* 11.7*   HEMATOCRIT % 35.1* 37.6 33.1*   PLATELETS 10*3/mm3 230 279 241     Results from last 7 days   Lab Units 02/09/25  1248   INR  1.04     Results from last 7 days   Lab Units 02/09/25  1955 02/09/25  1351 02/09/25  1248   HSTROP T ng/L 689* 787* 768*     Results from last 7 days   Lab Units 02/04/25  2155 02/04/25  2145   BLOODCX  No growth at 5 days No growth at 5 days     No results for input(s): \"PHART\", \"DFV8SEO\", \"PO2ART\", \"CSJ4KIS\", \"BASEEXCESS\" in the last 8760 hours.   I have reviewed the patient's laboratory results.    Radiology results:    XR Chest 1 View    Result Date: 2/9/2025  PROCEDURE: XR CHEST 1 VW-  HISTORY: SOA Triage Protocol  COMPARISON: 2/4/2025  FINDINGS:  Portable view of the chest demonstrates pulmonary edema has improved. Pulmonary vascular congestion is noted. Small pleural effusions are seen. There is no pneumothorax. The mediastinum is unremarkable.  The heart size is normal.      Impression: Mild CHF, significantly improved    This report was signed and finalized on 2/9/2025 1:10 PM by Eric Soria MD.     I have reviewed the patient's radiology reports.    Medication Review:     I have reviewed the patient's active and prn medications.     Problem List:      NSTEMI (non-ST elevated myocardial infarction)      Assessment:    Acute respiratory failure with hypoxia, POA  NSTEMI  Combined heart failure exacerbation with reduced EF, POA  Influenza A  Chronic kidney disease stage III  Type 2 diabetes mellitus  Hyperlipidemia  Hypertension    Plan:    Respiratory failure/NSTEMI/influenza A/heart failure exacerbation  -Initiate prednisone for mild scattered wheezing and acute bronchitis likely due to underlying influenza A.  -Patient able to be weaned from BiPAP and on nasal cannula.  -Continue DuoNebs.  -No known lung disease.  No history of tobacco use.  -CTA chest with prior admission negative for PE.  -Continue GDMT as patient able to tolerate.  Will " avoid nephrotoxic medications at this time as patient did receive contrast with CTA of chest and will be needing cardiac catheterization.  -Unfortunately patient unable to lay flat currently, will make n.p.o. after midnight planning for possible catheterization.  -Increase diuresis, although heart failure appears to be improved.  -Consult nephrology for further diuresis assistance if creatinine worsening.  -Monitor bradycardia.  -Further orders pending clinical course.    I have reviewed the copied text and it is accurate as of 2/10/2025     DVT Prophylaxis: SCD/heparin infusion  Code Status: Code  Diet: Cardiac/consistent carb  Discharge Plan: TBD    Lurdes Bhatti, APRN  02/10/25  12:58 EST    Dictated utilizing Dragon dictation.

## 2025-02-10 NOTE — PROGRESS NOTES
RT EQUIPMENT DEVICE RELATED - SKIN ASSESSMENT    Bello Score:  Bello Score: 17     RT Medical Equipment/Device:     NIV Mask:  Under-the-nose   size:  b    Skin Assessment:      Nose:  Intact    Device Skin Pressure Protection:   none    Nurse Notification:  No    Angeli Suh, CRT

## 2025-02-10 NOTE — PROGRESS NOTES
Pharmacy to Dose Heparin Infusion    Abebe Crockett is a  84 y.o. male receiving heparin infusion.     Therapy for (VTE/Cardiac):   cardiac  Patient Weight: 74.4 kg  Initial Bolus (Y/N):   Y  Any Bolus (Y/N):   Y        Signs or Symptoms of Bleeding: N    Cardiac or Other (Not VTE)   Initial Bolus: 60 units/kg (Max 4,000 units)  Initial rate: 12 units/kg/hr (Max 1,000 units/hr)   Anti Xa Rebolus Infusion Hold time Change infusion Dose (Units/kg/hr) Next Anti Xa or aPTT Level Due   < 0.1 50 Units/kg  (4000 Units Max) None Increase by  3 Units/kg/hr 6 hours   0.1- 0.19 25 Units/kg  (2000 Units Max) None Increase by  2 Units/kg/hr 6 hours   0.2 - 0.29 0 None Increase by  1 Units/kg/hr 6 hours   0.3 - 0.5 0 None No Change 6 hours (after 2 consecutive levels in range check qAM)   0.51 - 0.6 0 None Decrease by  1 Units/kg/hr 6 hours   0.61 - 0.8 0 30 Minutes Decrease by  2 Units/kg/hr 6 hours   0.81 - 1 0 60 Minutes Decrease by  3 Units/kg/hr 6 hours   >1 0 Hold  After Anti Xa less than 0.5 decrease previous rate by  4 Units/kg/hr  Every 2 hours until Anti Xa  less than 0.5 then when infusion restarts in 6 hours       Recommend anti-Xa every 6 hours.         Lab 02/10/25  1157 02/10/25  0558 02/10/25  0557 02/09/25  2255 02/09/25  1607 02/09/25  1248 02/06/25  0443 02/05/25  0536 02/04/25  2131   HEMOGLOBIN  --   --  12.1*  --   --  12.9* 11.7* 12.0* 14.3   HEMATOCRIT  --   --  35.1*  --   --  37.6 33.1* 34.3* 40.8   PLATELETS  --   --  230  --   --  279 241 262 337   PROTIME  --   --   --   --   --  14.1  --   --   --    APTT  --   --   --   --   --  29.1*  --   --   --    HEPARIN ANTI-XA 0.34 0.33  --  0.51 0.10*  --   --   --   --    CREATININE  --   --  1.43*  --   --  1.47* 1.69* 1.35* 1.36*   EGFR  --   --  48.9*  --   --  47.3* 40.0* 52.4* 52.0*          Date   Time   Anti-Xa Current Rate (Unit/kg/hr) Bolus   (Units) Rate Change   (Unit/kg/hr) New Rate (Unit/kg/hr) Next   Anti-Xa Comments  Pump Check Daily   2/9  1700 0.10 0 4000 +12.0 12.0 2/9 2300 d/w Tasha Martinez RN   2/9/25 2255 0.51 12  -1 11.0 0630 D/W Mary Crowder RN   2/10/25 0700 0.33 11  0 11 1200    2/10/25 1157 0.34 11 0 0 11 0600 D/W MARCO Cordova                                                             Pharmacy will continue to follow anti-Xa results and monitor for signs and symptoms of bleeding or thrombosis.      Thank you for the opportunity to consult on this patient.    Lou Morales, PharmD  02/10/25  12:46 EST

## 2025-02-10 NOTE — OUTREACH NOTE
CHF Week 1 Survey      Flowsheet Row Responses   [facility] facility patient discharged from? [facility]   Does the patient have one of the following disease processes/diagnoses(primary or secondary)? CHF   CHF Week 1 attempt successful? No   Unsuccessful attempts Attempt 1   Revoke Readmitted            Madeline LEIGH - Registered Nurse

## 2025-02-11 LAB
ANION GAP SERPL CALCULATED.3IONS-SCNC: 10.5 MMOL/L (ref 5–15)
BASOPHILS # BLD AUTO: 0.01 10*3/MM3 (ref 0–0.2)
BASOPHILS NFR BLD AUTO: 0.3 % (ref 0–1.5)
BUN SERPL-MCNC: 26 MG/DL (ref 8–23)
BUN/CREAT SERPL: 17.7 (ref 7–25)
CALCIUM SPEC-SCNC: 8.4 MG/DL (ref 8.6–10.5)
CHLORIDE SERPL-SCNC: 97 MMOL/L (ref 98–107)
CO2 SERPL-SCNC: 28.5 MMOL/L (ref 22–29)
CREAT SERPL-MCNC: 1.47 MG/DL (ref 0.76–1.27)
DEPRECATED RDW RBC AUTO: 40.8 FL (ref 37–54)
DEPRECATED RDW RBC AUTO: 40.8 FL (ref 37–54)
EGFRCR SERPLBLD CKD-EPI 2021: 47.3 ML/MIN/1.73
EOSINOPHIL # BLD AUTO: 0.07 10*3/MM3 (ref 0–0.4)
EOSINOPHIL NFR BLD AUTO: 1.9 % (ref 0.3–6.2)
ERYTHROCYTE [DISTWIDTH] IN BLOOD BY AUTOMATED COUNT: 12.8 % (ref 12.3–15.4)
ERYTHROCYTE [DISTWIDTH] IN BLOOD BY AUTOMATED COUNT: 12.8 % (ref 12.3–15.4)
GLUCOSE SERPL-MCNC: 102 MG/DL (ref 65–99)
HCT VFR BLD AUTO: 34.2 % (ref 37.5–51)
HCT VFR BLD AUTO: 37.9 % (ref 37.5–51)
HGB BLD-MCNC: 11.8 G/DL (ref 13–17.7)
HGB BLD-MCNC: 13.5 G/DL (ref 13–17.7)
IMM GRANULOCYTES # BLD AUTO: 0.02 10*3/MM3 (ref 0–0.05)
IMM GRANULOCYTES NFR BLD AUTO: 0.6 % (ref 0–0.5)
LYMPHOCYTES # BLD AUTO: 1.27 10*3/MM3 (ref 0.7–3.1)
LYMPHOCYTES NFR BLD AUTO: 35.1 % (ref 19.6–45.3)
MAGNESIUM SERPL-MCNC: 1.9 MG/DL (ref 1.6–2.4)
MCH RBC QN AUTO: 29.9 PG (ref 26.6–33)
MCH RBC QN AUTO: 30.8 PG (ref 26.6–33)
MCHC RBC AUTO-ENTMCNC: 34.5 G/DL (ref 31.5–35.7)
MCHC RBC AUTO-ENTMCNC: 35.6 G/DL (ref 31.5–35.7)
MCV RBC AUTO: 86.3 FL (ref 79–97)
MCV RBC AUTO: 86.8 FL (ref 79–97)
MONOCYTES # BLD AUTO: 0.61 10*3/MM3 (ref 0.1–0.9)
MONOCYTES NFR BLD AUTO: 16.9 % (ref 5–12)
NEUTROPHILS NFR BLD AUTO: 1.64 10*3/MM3 (ref 1.7–7)
NEUTROPHILS NFR BLD AUTO: 45.2 % (ref 42.7–76)
NRBC BLD AUTO-RTO: 0 /100 WBC (ref 0–0.2)
PHOSPHATE SERPL-MCNC: 3.3 MG/DL (ref 2.5–4.5)
PLATELET # BLD AUTO: 243 10*3/MM3 (ref 140–450)
PLATELET # BLD AUTO: 266 10*3/MM3 (ref 140–450)
PMV BLD AUTO: 10.5 FL (ref 6–12)
PMV BLD AUTO: 9.8 FL (ref 6–12)
POTASSIUM SERPL-SCNC: 3.5 MMOL/L (ref 3.5–5.2)
POTASSIUM SERPL-SCNC: 3.9 MMOL/L (ref 3.5–5.2)
RBC # BLD AUTO: 3.94 10*6/MM3 (ref 4.14–5.8)
RBC # BLD AUTO: 4.39 10*6/MM3 (ref 4.14–5.8)
SODIUM SERPL-SCNC: 136 MMOL/L (ref 136–145)
UFH PPP CHRO-ACNC: 0.25 IU/ML (ref 0.3–0.7)
UFH PPP CHRO-ACNC: 0.29 IU/ML (ref 0.3–0.7)
UFH PPP CHRO-ACNC: 0.43 IU/ML (ref 0.3–0.7)
WBC NRBC COR # BLD AUTO: 3.43 10*3/MM3 (ref 3.4–10.8)
WBC NRBC COR # BLD AUTO: 3.62 10*3/MM3 (ref 3.4–10.8)

## 2025-02-11 PROCEDURE — 63710000001 PREDNISONE PER 1 MG: Performed by: NURSE PRACTITIONER

## 2025-02-11 PROCEDURE — 83735 ASSAY OF MAGNESIUM: CPT | Performed by: FAMILY MEDICINE

## 2025-02-11 PROCEDURE — 84100 ASSAY OF PHOSPHORUS: CPT | Performed by: FAMILY MEDICINE

## 2025-02-11 PROCEDURE — 85027 COMPLETE CBC AUTOMATED: CPT | Performed by: INTERNAL MEDICINE

## 2025-02-11 PROCEDURE — 80048 BASIC METABOLIC PNL TOTAL CA: CPT | Performed by: FAMILY MEDICINE

## 2025-02-11 PROCEDURE — 85025 COMPLETE CBC W/AUTO DIFF WBC: CPT | Performed by: FAMILY MEDICINE

## 2025-02-11 PROCEDURE — 85520 HEPARIN ASSAY: CPT | Performed by: INTERNAL MEDICINE

## 2025-02-11 PROCEDURE — 85520 HEPARIN ASSAY: CPT | Performed by: FAMILY MEDICINE

## 2025-02-11 PROCEDURE — 94799 UNLISTED PULMONARY SVC/PX: CPT

## 2025-02-11 PROCEDURE — 25010000002 FUROSEMIDE PER 20 MG: Performed by: NURSE PRACTITIONER

## 2025-02-11 PROCEDURE — 85520 HEPARIN ASSAY: CPT | Performed by: NURSE PRACTITIONER

## 2025-02-11 PROCEDURE — 25810000003 SODIUM CHLORIDE 0.9 % SOLUTION: Performed by: INTERNAL MEDICINE

## 2025-02-11 PROCEDURE — 25010000002 MAGNESIUM SULFATE 4 GM/100ML SOLUTION: Performed by: NURSE PRACTITIONER

## 2025-02-11 PROCEDURE — 99232 SBSQ HOSP IP/OBS MODERATE 35: CPT | Performed by: NURSE PRACTITIONER

## 2025-02-11 PROCEDURE — 84132 ASSAY OF SERUM POTASSIUM: CPT | Performed by: NURSE PRACTITIONER

## 2025-02-11 RX ORDER — ASPIRIN 325 MG
325 TABLET, DELAYED RELEASE (ENTERIC COATED) ORAL DAILY
Status: DISCONTINUED | OUTPATIENT
Start: 2025-02-12 | End: 2025-02-11

## 2025-02-11 RX ORDER — ASPIRIN 325 MG
325 TABLET ORAL ONCE
Status: COMPLETED | OUTPATIENT
Start: 2025-02-11 | End: 2025-02-11

## 2025-02-11 RX ORDER — IPRATROPIUM BROMIDE AND ALBUTEROL SULFATE 2.5; .5 MG/3ML; MG/3ML
3 SOLUTION RESPIRATORY (INHALATION) EVERY 6 HOURS PRN
Status: DISCONTINUED | OUTPATIENT
Start: 2025-02-11 | End: 2025-02-17 | Stop reason: HOSPADM

## 2025-02-11 RX ORDER — MAGNESIUM SULFATE HEPTAHYDRATE 40 MG/ML
4 INJECTION, SOLUTION INTRAVENOUS ONCE
Status: COMPLETED | OUTPATIENT
Start: 2025-02-11 | End: 2025-02-11

## 2025-02-11 RX ORDER — SODIUM CHLORIDE 9 MG/ML
75 INJECTION, SOLUTION INTRAVENOUS ONCE
Status: COMPLETED | OUTPATIENT
Start: 2025-02-11 | End: 2025-02-11

## 2025-02-11 RX ORDER — SACUBITRIL AND VALSARTAN 24; 26 MG/1; MG/1
1 TABLET, FILM COATED ORAL EVERY 12 HOURS SCHEDULED
Status: DISCONTINUED | OUTPATIENT
Start: 2025-02-11 | End: 2025-02-13

## 2025-02-11 RX ORDER — POTASSIUM CHLORIDE 1500 MG/1
40 TABLET, EXTENDED RELEASE ORAL EVERY 4 HOURS
Status: DISPENSED | OUTPATIENT
Start: 2025-02-11 | End: 2025-02-11

## 2025-02-11 RX ADMIN — ASPIRIN 81 MG: 81 TABLET, COATED ORAL at 20:59

## 2025-02-11 RX ADMIN — SODIUM CHLORIDE 75 ML/HR: 9 INJECTION, SOLUTION INTRAVENOUS at 18:03

## 2025-02-11 RX ADMIN — FUROSEMIDE 40 MG: 10 INJECTION, SOLUTION INTRAMUSCULAR; INTRAVENOUS at 20:55

## 2025-02-11 RX ADMIN — CARVEDILOL 3.12 MG: 3.12 TABLET, FILM COATED ORAL at 08:23

## 2025-02-11 RX ADMIN — POTASSIUM CHLORIDE 40 MEQ: 1500 TABLET, EXTENDED RELEASE ORAL at 11:07

## 2025-02-11 RX ADMIN — CLOPIDOGREL BISULFATE 75 MG: 75 TABLET ORAL at 08:24

## 2025-02-11 RX ADMIN — IPRATROPIUM BROMIDE AND ALBUTEROL SULFATE 3 ML: 2.5; .5 SOLUTION RESPIRATORY (INHALATION) at 00:25

## 2025-02-11 RX ADMIN — MAGNESIUM SULFATE HEPTAHYDRATE 4 G: 40 INJECTION, SOLUTION INTRAVENOUS at 11:07

## 2025-02-11 RX ADMIN — PREDNISONE 40 MG: 20 TABLET ORAL at 08:23

## 2025-02-11 RX ADMIN — FUROSEMIDE 40 MG: 10 INJECTION, SOLUTION INTRAMUSCULAR; INTRAVENOUS at 08:24

## 2025-02-11 RX ADMIN — Medication 10 ML: at 21:00

## 2025-02-11 RX ADMIN — FENOFIBRATE 145 MG: 145 TABLET, FILM COATED ORAL at 20:59

## 2025-02-11 RX ADMIN — ASPIRIN 325 MG: 325 TABLET ORAL at 18:03

## 2025-02-11 NOTE — PROGRESS NOTES
Patient Identification:  Name:  Abebe Crockett  Age:  84 y.o.  Sex:  male  :  1940  MRN:  [id number]  Visit Number:  [id number]        Patient's acute and chronic issues:  1.  Influenza A with bronchitis (2025)  2.  Cardiomyopathy  - LVEF 30-35% (echo 2025)  3.  Systolic heart failure (newly diagnosed)  4.  HTN  5.  HLD: , , HDL 27,  (2025)  6.  DM2  7.  CKD: Creatinine 1.43/GFR 48.9 (2/10/2025)  - Primary cardiologist: Dr. Myles     Today's visit  Patient had bradycardia overnight hence beta-blocker was stopped.    -Denies chest pain  - Back on BiPAP  - No leg edema    -Creat 1.47.  BUN 26.        Assessment and plan:  1.  Elevated troponin  - Patient denies any anginal symptoms.  - Elevated troponin can be due to ACS versus related to his LV dysfunction versus related to his influenza.  - Given his chest pain-free no urgent need for coronary angiogram.  However given his new LV dysfunction patient will need ischemic assessment: Will plan for it once patient is further diuresed.     2.  Cardiomyopathy: LVEF 30-35%  - This is a recent diagnosis  - Etiology not entirely clear, can be hypertensive myopathy.  - Ischemic etiology will need to be excluded, will plan for cardiac cath tomorrow.   - Continue guideline directed medical therapy  - BB: Was on Coreg 6.25 BID --> stopped due to bradycardia. May try low dose Toprol at 12.5 later.   - ARNI: Entresto 24/26 BID once he has been off ACE-I/Lisinopril (for 36 hrs),   - SGLT2 inhibitor: Jardiance 10  - MRA: Aldactone next if allowed by BP and renal function.  - Diuretic: Lasix 40 IV twice daily.     3.  Systolic heart failure, acute  - See discussion under #2     4.  HTN  - BP target less than 130/80  - On beta-blocker and ARNI  - Prefer to avoid calcium channel blocker given his LV dysfunction.     4.  HLD  - Patient is only on fenofibrate, triglyceride level is acceptable  - His LDL is elevated, will benefit from statin.     6.   DM2  - Plan per medicine team     7.  CKD  - GFR 48.9  - Continue to monitor closely as patient is being diuresed.    8. Influenza A, bronchitis  - Plan per medicine team     ================================================================================     Cardiac and pertinent studies.  EKG  - 2/9/2025: NSR 88.  Frequent PVC.  Subtle ST depression in lead V5 and V6  Echo  - 2/5/2025: LVEF 30-35%, global hypokinesis.  Grade 2 DD.  RV SF normal.  No AV stenosis/regurgitation.  No MV stenosis/trace regurgitation.  - 12/20/2019: LVEF 65-70%, grade 1 DD F.  Mild LVH.  RV SF normal.  Stress test: None  Cardiac cath: None  Chest CT  - 2//2025: No PE.  Findings suggestive of heart failure.    ==========================================================================    aspirin, 81 mg, Oral, Nightly  [Held by provider] carvedilol, 3.125 mg, Oral, BID With Meals  clopidogrel, 75 mg, Oral, Daily  [Held by provider] empagliflozin, 10 mg, Oral, Daily  fenofibrate, 145 mg, Oral, Nightly  furosemide, 40 mg, Intravenous, BID Diuretics  magnesium sulfate, 4 g, Intravenous, Once  potassium chloride ER, 40 mEq, Oral, Q4H  predniSONE, 40 mg, Oral, Daily With Breakfast  sacubitril-valsartan, 1 tablet, Oral, Q12H  senna-docusate sodium, 2 tablet, Oral, BID  sodium chloride, 10 mL, Intravenous, Q12H      heparin, 11.5 Units/kg/hr, Last Rate: 11.5 Units/kg/hr (02/11/25 0823)  Pharmacy to Dose Heparin,         Vital Signs:  Temp:  [97.4 °F (36.3 °C)-98.5 °F (36.9 °C)] 97.8 °F (36.6 °C)  Heart Rate:  [55-73] 64  Resp:  [18] 18  BP: (132-175)/(62-83) 146/74      02/09/25  1223 02/09/25  1556   Weight: 74.4 kg (164 lb) 74.5 kg (164 lb 3.9 oz)     Body mass index is 25.72 kg/m².    Intake/Output Summary (Last 24 hours) at 2/11/2025 1236  Last data filed at 2/11/2025 1233  Gross per 24 hour   Intake 438.53 ml   Output 3000 ml   Net -2561.47 ml     Diet: Cardiac; Healthy Heart (2-3 Na+); Fluid Consistency: Thin (IDDSI 0)  NPO Diet NPO Type:  "Strict NPO    Physical exam:  Constitutional:    HENT:  Head:  Normocephalic and atraumatic.    Eyes:  No scleral icterus.    Neck:  No JVD present.    Cardiovascular: Regular rhythm, S1 S2+, NO S3 / S4  Pulmonary/Chest:  Breath sounds B/L  Abdominal:  Soft.  No distension and no tenderness.   Neurological:  Alert and oriented x 3. No focal deficits.  Skin:  Skin is warm and dry.       Results from last 7 days   Lab Units 02/09/25 1955 02/09/25  1351 02/09/25  1248   HSTROP T ng/L 689* 787* 768*     Results from last 7 days   Lab Units 02/11/25  0653 02/10/25  0557 02/09/25  1248 02/05/25  0536 02/05/25  0012   LACTATE mmol/L  --   --   --   --  1.3   WBC 10*3/mm3 3.62 3.01* 3.82   < >  --    HEMOGLOBIN g/dL 11.8* 12.1* 12.9*   < >  --    HEMATOCRIT % 34.2* 35.1* 37.6   < >  --    MCV fL 86.8 88.6 89.1   < >  --    MCHC g/dL 34.5 34.5 34.3   < >  --    PLATELETS 10*3/mm3 243 230 279   < >  --    INR   --   --  1.04  --   --     < > = values in this interval not displayed.         Results from last 7 days   Lab Units 02/11/25 0653 02/10/25  0557 02/09/25  1248 02/05/25  0536 02/04/25  2131   SODIUM mmol/L 136 137 135*   < > 132*   POTASSIUM mmol/L 3.5 4.0 4.3   < > 4.5   MAGNESIUM mg/dL 1.9 2.0  --   --   --    CHLORIDE mmol/L 97* 99 100   < > 97*   CO2 mmol/L 28.5 27.6 26.1   < > 25.5   BUN mg/dL 26* 28* 28*   < > 15   CREATININE mg/dL 1.47* 1.43* 1.47*   < > 1.36*   CALCIUM mg/dL 8.4* 8.6 9.0   < > 9.6   GLUCOSE mg/dL 102* 100* 195*   < > 154*   ALBUMIN g/dL  --   --  3.5  --  4.2   BILIRUBIN mg/dL  --   --  0.4  --  0.4   ALK PHOS U/L  --   --  39  --  48   AST (SGOT) U/L  --   --  42*  --  34   ALT (SGPT) U/L  --   --  25  --  20    < > = values in this interval not displayed.   Estimated Creatinine Clearance: 40.8 mL/min (A) (by C-G formula based on SCr of 1.47 mg/dL (H)).    No results found for: \"AMMONIA\"  Results from last 7 days   Lab Units 02/09/25  1351   CHOLESTEROL mg/dL 189   TRIGLYCERIDES mg/dL 155* " "  HDL CHOL mg/dL 27*   LDL CHOL mg/dL 134*     Blood Culture   Date Value Ref Range Status   02/04/2025 No growth at 5 days  Final   02/04/2025 No growth at 5 days  Final     No results found for: \"URINECX\"  No results found for: \"WOUNDCX\"  No results found for: \"STOOLCX\"    I have personally looked at the labs and they are summarized above.      Imaging Results (Last 24 Hours)       ** No results found for the last 24 hours. **            Assessment and Plan:  See discussion above      Benjamin Le MD   02/11/25  12:36 EST  .            "

## 2025-02-11 NOTE — PLAN OF CARE
Goal Outcome Evaluation:  Plan of Care Reviewed With: patient           Outcome Evaluation: Patient has had some improvement this shift. 2LNC in use, when CPAP not in use. Possible Heart Cath In the AM. Will contiue to monitor.

## 2025-02-11 NOTE — PROGRESS NOTES
Pharmacy to Dose Heparin Infusion    Abebe Crockett is a  84 y.o. male receiving heparin infusion.     Therapy for (VTE/Cardiac): Cardiac  Patient Weight: 74.4 kg  Initial Bolus (Y/N):   Y  Any Bolus (Y/N):   Y        Signs or Symptoms of Bleeding: N    Cardiac or Other (Not VTE)   Initial Bolus: 60 units/kg (Max 4,000 units)  Initial rate: 12 units/kg/hr (Max 1,000 units/hr)   Anti Xa Rebolus Infusion Hold time Change infusion Dose (Units/kg/hr) Next Anti Xa or aPTT Level Due   < 0.1 50 Units/kg  (4000 Units Max) None Increase by  3 Units/kg/hr 6 hours   0.1- 0.19 25 Units/kg  (2000 Units Max) None Increase by  2 Units/kg/hr 6 hours   0.2 - 0.29 0 None Increase by  1 Units/kg/hr 6 hours   0.3 - 0.5 0 None No Change 6 hours (after 2 consecutive levels in range check qAM)   0.51 - 0.6 0 None Decrease by  1 Units/kg/hr 6 hours   0.61 - 0.8 0 30 Minutes Decrease by  2 Units/kg/hr 6 hours   0.81 - 1 0 60 Minutes Decrease by  3 Units/kg/hr 6 hours   >1 0 Hold  After Anti Xa less than 0.5 decrease previous rate by  4 Units/kg/hr  Every 2 hours until Anti Xa  less than 0.5 then when infusion restarts in 6 hours       Recommend anti-Xa every 6 hours.         Lab 02/11/25  0653 02/10/25  1157 02/10/25  0558 02/10/25  0557 02/09/25  2255 02/09/25  1607 02/09/25  1248 02/06/25  0443 02/05/25  0536   HEMOGLOBIN 11.8*  --   --  12.1*  --   --  12.9* 11.7* 12.0*   HEMATOCRIT 34.2*  --   --  35.1*  --   --  37.6 33.1* 34.3*   PLATELETS 243  --   --  230  --   --  279 241 262   PROTIME  --   --   --   --   --   --  14.1  --   --    APTT  --   --   --   --   --   --  29.1*  --   --    HEPARIN ANTI-XA 0.25* 0.34 0.33  --  0.51 0.10*  --   --   --    CREATININE 1.47*  --   --  1.43*  --   --  1.47* 1.69* 1.35*   EGFR 47.3*  --   --  48.9*  --   --  47.3* 40.0* 52.4*          Date   Time   Anti-Xa Current Rate (Unit/kg/hr) Bolus   (Units) Rate Change   (Unit/kg/hr) New Rate (Unit/kg/hr) Next   Anti-Xa Comments  Pump Check Daily   2/9  1700 0.10 0 4000 +12.0 12.0 2/9 2300 d/w Tasha Martinez RN   2/9/25 2255 0.51 12  -1 11.0 0630 D/W Mary Crowder RN   2/10/25 0700 0.33 11  0 11 1200    2/10/25 1157 0.34 11 0 0 11 0600 D/W MARCO Cordova    2/11/25 0653 0.25 11 -- +0.5 11.5 1400 D/W MARCO De La Rosa   (increase by 0.5 instead of 1 due to previous supratherapeutic level at 12u/kg/hr rate)                                                 Pharmacy will continue to follow anti-Xa results and monitor for signs and symptoms of bleeding or thrombosis.      Thank you for the opportunity to consult on this patient.    David Campos, PharmD  2/11/2025  08:00 EST

## 2025-02-11 NOTE — PROGRESS NOTES
[facility] HOSPITALIST    PROGRESS NOTE    Name:  Abebe Crockett   Age:  84 y.o.  Sex:  male  :  1940  MRN:  [id number]   Visit Number:  [id number]  Admission Date:  2025  Date Of Service:  25  Primary Care Physician:  Dre Henry MD     LOS: 1 day :    Chief Complaint:      Shortness of air    Subjective:    Patient seen and examined with wife at bedside.  Patient has remained on 2 L overnight and did well.  Able to wean off of oxygen during my exam.  States his shortness of air is intermittent and not constant.  Does not feel like he can lay flat.  He did receive 1 dose of morphine overnight for dyspnea.  Continues to deny chest pain.    Hospital Course:    Mr. 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  Patient presented to emergency department due to worsening shortness of air.  Patient denied associated chest pain, nausea, vomiting, or jaw pain.  Worsening dyspnea noted with ambulation.    Upon ED presentation patient hypertensive with blood pressure 169/105, shortly thereafter requiring BiPAP due to severe dyspnea.  Pertinent labs and imaging noted initial troponin 768 with repeat 797 and then 689, creatinine 1.4 which is baseline, proBNP 12,000 with prior 14,000, chest x-ray noting mild CHF with significant improvement.  Hospitalist consulted for further medical management.  Cardiology consulted.  Patient initiated on heparin infusion/continued beta-blocker/aspirin.  Due to patient requiring BiPAP, currently able to lay flat for cardiac catheterization.  Cardiology suggested increasing Lasix, will schedule cardiac catheterization as patient respiratory status improves.  Entresto initiated.    Review of Systems:     All systems  were reviewed and negative except as mentioned in subjective, assessment and plan.    Vital Signs:    Temp:  [97.4 °F (36.3 °C)-98.5 °F (36.9 °C)] 97.8 °F (36.6 °C)  Heart Rate:  [55-73] 64  Resp:  [18] 18  BP: (132-175)/(62-83) 146/74    Intake and output:    I/O last 3 completed shifts:  In: 218.5 [I.V.:218.5]  Out: 4200 [Urine:4200]  I/O this shift:  In: -   Out: 700 [Urine:700]    Physical Examination:    General Appearance:  Alert and cooperative.  Chronically ill-appearing middle-age male.   Head:  Atraumatic and normocephalic.   Eyes: Conjunctivae and sclerae normal, no icterus. No pallor.   Throat: No oral lesions, no thrush, oral mucosa moist.   Neck: Supple, trachea midline, no thyromegaly.   Lungs:   Breath sounds heard bilaterally equally.  On room air unlabored.   Heart:  Normal S1 and S2, no murmur, no gallop, no rub. No JVD.   Abdomen:   Normal bowel sounds, no masses, no organomegaly. Soft, nontender, nondistended, no rebound tenderness.   Extremities: Supple, no edema, no cyanosis, no clubbing.   Skin: No bleeding or rash.   Neurologic: Alert and oriented x 3. No facial asymmetry. Moves all four limbs. No tremors.  Generalized weakness.     Laboratory results:    Results from last 7 days   Lab Units 02/11/25  0653 02/10/25  0557 02/09/25  1248 02/05/25  0536 02/04/25  2131   SODIUM mmol/L 136 137 135*   < > 132*   POTASSIUM mmol/L 3.5 4.0 4.3   < > 4.5   CHLORIDE mmol/L 97* 99 100   < > 97*   CO2 mmol/L 28.5 27.6 26.1   < > 25.5   BUN mg/dL 26* 28* 28*   < > 15   CREATININE mg/dL 1.47* 1.43* 1.47*   < > 1.36*   CALCIUM mg/dL 8.4* 8.6 9.0   < > 9.6   BILIRUBIN mg/dL  --   --  0.4  --  0.4   ALK PHOS U/L  --   --  39  --  48   ALT (SGPT) U/L  --   --  25  --  20   AST (SGOT) U/L  --   --  42*  --  34   GLUCOSE mg/dL 102* 100* 195*   < > 154*    < > = values in this interval not displayed.     Results from last 7 days   Lab Units 02/11/25  0653 02/10/25  0557 02/09/25  1248   WBC 10*3/mm3 3.62 3.01*  "3.82   HEMOGLOBIN g/dL 11.8* 12.1* 12.9*   HEMATOCRIT % 34.2* 35.1* 37.6   PLATELETS 10*3/mm3 243 230 279     Results from last 7 days   Lab Units 02/09/25  1248   INR  1.04     Results from last 7 days   Lab Units 02/09/25  1955 02/09/25  1351 02/09/25  1248   HSTROP T ng/L 689* 787* 768*     Results from last 7 days   Lab Units 02/04/25  2155 02/04/25  2145   BLOODCX  No growth at 5 days No growth at 5 days     No results for input(s): \"PHART\", \"OQB8IIA\", \"PO2ART\", \"WKP1LAY\", \"BASEEXCESS\" in the last 8760 hours.   I have reviewed the patient's laboratory results.    Radiology results:    XR Chest 1 View    Result Date: 2/9/2025  PROCEDURE: XR CHEST 1 VW-  HISTORY: SOA Triage Protocol  COMPARISON: 2/4/2025  FINDINGS:  Portable view of the chest demonstrates pulmonary edema has improved. Pulmonary vascular congestion is noted. Small pleural effusions are seen. There is no pneumothorax. The mediastinum is unremarkable.  The heart size is normal.      Impression: Mild CHF, significantly improved    This report was signed and finalized on 2/9/2025 1:10 PM by Eric Soria MD.     I have reviewed the patient's radiology reports.    Medication Review:     I have reviewed the patient's active and prn medications.     Problem List:      NSTEMI (non-ST elevated myocardial infarction)    Acute systolic heart failure      Assessment:    Acute respiratory failure with hypoxia, POA  NSTEMI  Combined heart failure exacerbation with reduced EF, POA  Influenza A  Chronic kidney disease stage III  Type 2 diabetes mellitus  Hyperlipidemia  Hypertension    Plan:    Respiratory failure/influenza A  -Initiate prednisone for mild scattered wheezing and acute bronchitis likely due to underlying influenza A.  -Patient able to be weaned from BiPAP and currently on room air.  -Continue DuoNebs.  -No known lung disease.  No history of tobacco use.  -CTA chest with prior admission negative for PE.  --No indication for Tamiflu at this time as " previously completed.    Heart failure/NSTEMI  -Continue GDMT as patient able to tolerate.    -For hospitalization noted combined heart failure with EF 30 to 35%.  -Will continue p.o. Lasix as patient currently appears to be euvolemic.  -Monitor bradycardia-Coreg on hold for now.  -Entresto initiated.  -Patient continues to say he is not able to lay flat due to dyspnea.  Cardiac catheterization tentatively scheduled for tomorrow.  -Heparin infusion continued.  -Continue aspirin and Plavix.  -Continue Jardiance.  -Unable to tolerate statins in the past.  -Patient overall volume status appears improved from prior hospitalization.  -Patient continues to deny chest pain.      I have reviewed the copied text and it is accurate as of 2/11/2025     DVT Prophylaxis: SCD/heparin infusion  Code Status: Code  Diet: Cardiac/consistent carb-n.p.o. after midnight.  Discharge Plan: TRINA Bhatti, APRN  02/11/25  12:29 EST    Dictated utilizing Dragon dictation.

## 2025-02-11 NOTE — THERAPY EVALUATION
PT evaluation held, per RN he is on heparin and awaiting cardiac catheterization tomorrow.  PT will follow up tomorrow.

## 2025-02-11 NOTE — PLAN OF CARE
Goal Outcome Evaluation:           Progress: improving          Problem: Adult Inpatient Plan of Care  Goal: Plan of Care Review  Outcome: Progressing  Flowsheets  Taken 2/11/2025 1855 by Estrella Romero RN  Progress: improving  Taken 2/11/2025 0716 by Mary Crowder RN  Plan of Care Reviewed With: patient  Goal: Patient-Specific Goal (Individualized)  Outcome: Progressing  Goal: Absence of Hospital-Acquired Illness or Injury  Outcome: Progressing  Intervention: Identify and Manage Fall Risk  Description: Perform standard risk assessment on admission using a validated tool or comprehensive approach appropriate to the patient; reassess fall risk frequently, with change in status or transfer to another level of care.  Communicate risk to interprofessional healthcare team; ensure fall risk visible cue.  Determine need for increased observation, equipment and environmental modification, as well as use of supportive, nonskid footwear.  Adjust safety measures to individual needs and identified risk factors.  Reinforce the importance of active participation with fall risk prevention, safety, and physical activity with the patient and family.  Perform regular intentional rounding to assess need for position change, pain assessment and personal needs, including assistance with toileting.  Recent Flowsheet Documentation  Taken 2/11/2025 1800 by Estrella Romero, RN  Safety Promotion/Fall Prevention:   safety round/check completed   room organization consistent   nonskid shoes/slippers when out of bed   fall prevention program maintained   clutter free environment maintained   assistive device/personal items within reach   activity supervised  Taken 2/11/2025 1600 by Estrella Romero, RN  Safety Promotion/Fall Prevention:   safety round/check completed   room organization consistent   nonskid shoes/slippers when out of bed   fall prevention program maintained   clutter free environment maintained   assistive  device/personal items within reach   activity supervised  Taken 2/11/2025 1400 by Estrella Romero RN  Safety Promotion/Fall Prevention:   safety round/check completed   room organization consistent   nonskid shoes/slippers when out of bed   fall prevention program maintained   clutter free environment maintained   assistive device/personal items within reach   activity supervised  Taken 2/11/2025 1200 by Estrella Romero RN  Safety Promotion/Fall Prevention:   safety round/check completed   room organization consistent   nonskid shoes/slippers when out of bed   fall prevention program maintained   clutter free environment maintained   assistive device/personal items within reach   activity supervised  Taken 2/11/2025 1000 by Estrella Romero RN  Safety Promotion/Fall Prevention:   safety round/check completed   room organization consistent   nonskid shoes/slippers when out of bed   fall prevention program maintained   clutter free environment maintained   assistive device/personal items within reach   activity supervised  Taken 2/11/2025 0800 by Estrella Romero RN  Safety Promotion/Fall Prevention:   safety round/check completed   room organization consistent   nonskid shoes/slippers when out of bed   fall prevention program maintained   clutter free environment maintained   assistive device/personal items within reach   activity supervised  Intervention: Prevent Skin Injury  Description: Perform a screening for skin injury risk, such as pressure or moisture-associated skin damage on admission and at regular intervals throughout hospital stay.  Keep all areas of skin (especially folds) clean and dry.  Maintain adequate skin hydration.  Relieve and redistribute pressure and protect bony prominences and skin at risk for injury; implement measures based on patient-specific risk factors.  Match turning and repositioning schedule to clinical condition.  Encourage weight shift frequently; assist with  reposition if unable to complete independently.  Float heels off bed; avoid pressure on the Achilles tendon.  Keep skin free from extended contact with medical devices.  Optimize nutrition and hydration.  Encourage functional activity and mobility, as early as tolerated.  Use aids (e.g., slide boards, mechanical lift) during transfer.  Recent Flowsheet Documentation  Taken 2/11/2025 1800 by Estrella Romero RN  Body Position:   position changed independently   tilted   left  Skin Protection:   transparent dressing maintained   incontinence pads utilized  Taken 2/11/2025 1600 by Estrella Romero RN  Body Position: sitting up in bed  Skin Protection:   transparent dressing maintained   incontinence pads utilized  Taken 2/11/2025 1400 by Estrella Romero RN  Body Position: position changed independently  Skin Protection:   transparent dressing maintained   incontinence pads utilized  Taken 2/11/2025 1200 by Estrella Romero RN  Body Position: sitting up in bed  Skin Protection:   transparent dressing maintained   incontinence pads utilized  Taken 2/11/2025 1000 by Estrella Romero RN  Body Position:   side-lying   right  Skin Protection:   transparent dressing maintained   incontinence pads utilized  Taken 2/11/2025 0800 by Estrella Romero RN  Body Position: sitting up in bed  Skin Protection:   transparent dressing maintained   incontinence pads utilized  Intervention: Prevent Infection  Description: Maintain skin and mucous membrane integrity; promote hand, oral and pulmonary hygiene.  Optimize fluid balance, nutrition, sleep and glycemic control to maximize infection resistance.  Identify potential sources of infection early to prevent or mitigate progression of infection (e.g., wound, lines, devices).  Evaluate ongoing need for invasive devices; remove promptly when no longer indicated.  Review vaccination status.  Recent Flowsheet Documentation  Taken 2/11/2025 1800 by Estrella Romero  RN  Infection Prevention:   environmental surveillance performed   equipment surfaces disinfected   hand hygiene promoted   personal protective equipment utilized   rest/sleep promoted   single patient room provided   visitors restricted/screened  Taken 2/11/2025 1600 by Estrella Romero RN  Infection Prevention:   environmental surveillance performed   equipment surfaces disinfected   hand hygiene promoted   personal protective equipment utilized   rest/sleep promoted   single patient room provided   visitors restricted/screened  Taken 2/11/2025 1400 by Estrella Romero RN  Infection Prevention:   environmental surveillance performed   equipment surfaces disinfected   hand hygiene promoted   personal protective equipment utilized   rest/sleep promoted   single patient room provided   visitors restricted/screened  Taken 2/11/2025 1200 by Estrella Romero RN  Infection Prevention:   environmental surveillance performed   equipment surfaces disinfected   hand hygiene promoted   personal protective equipment utilized   rest/sleep promoted   single patient room provided   visitors restricted/screened  Taken 2/11/2025 1000 by Estrella Romero RN  Infection Prevention:   environmental surveillance performed   equipment surfaces disinfected   hand hygiene promoted   personal protective equipment utilized   rest/sleep promoted   single patient room provided   visitors restricted/screened  Taken 2/11/2025 0800 by Estrella Romero RN  Infection Prevention:   environmental surveillance performed   equipment surfaces disinfected   hand hygiene promoted   personal protective equipment utilized   rest/sleep promoted   single patient room provided   visitors restricted/screened  Goal: Optimal Comfort and Wellbeing  Outcome: Progressing  Intervention: Provide Person-Centered Care  Description: Use a family-focused approach to care; encourage support system presence and participation.  Develop trust and rapport  by proactively providing information, encouraging questions, addressing concerns and offering reassurance.  Acknowledge emotional response to hospitalization.  Recognize and utilize personal coping strategies and strengths; develop goals via shared decision-making.  Honor spiritual and cultural preferences.  Recent Flowsheet Documentation  Taken 2/11/2025 0800 by Estrella Romero RN  Trust Relationship/Rapport:   care explained   choices provided   questions answered   questions encouraged   thoughts/feelings acknowledged  Goal: Readiness for Transition of Care  Outcome: Progressing     Problem: Pain Acute  Goal: Optimal Pain Control and Function  Outcome: Progressing  Intervention: Optimize Psychosocial Wellbeing  Description: Facilitate patient's self-control over pain by providing pain information and allowing choices in treatment.  Consider and address emotional response to pain; express compassion and reassurance.  Explore and promote use of coping strategies; address barriers to successful coping.  Evaluate and assist with psychosocial, cultural and spiritual factors impacting pain.  Assess for risk factors for developing chronic pain, such as depression, fear, pain avoidance and pain catastrophizing.  Modify pain perception using techniques, such as distraction, mindfulness, guided imagery, meditation or music.  Consider referral for ongoing coping support, such as education, relaxation training and role of thoughts.  Recent Flowsheet Documentation  Taken 2/11/2025 0800 by Estrella Romero, RN  Diversional Activities: television  Intervention: Prevent or Manage Pain  Description: Evaluate pain level, effect of treatment and patient response at regular intervals.  Minimize painful stimuli; coordinate care and adjust environment (e.g., light, noise, unnecessary movement); promote sleep/rest.  Match pharmacologic analgesia to severity and type of pain mechanism (e.g., neuropathic, muscle, inflammatory);  consider multimodal approach.  Provide medication at regular intervals; titrate to patient response; premedicate for painful procedures.  Manage breakthrough pain with additional doses; consider rotation or switching medication.  Monitor for signs of substance tolerance (increased dose to reach desired effect, decreased effect with same dose).  Manage medication-induced adverse events and side effects.  Provide multimodal interventions, such as physical activity, therapeutic exercise, yoga, TENS (transcutaneous electrical nerve stimulation) and manual therapy.  Train in functional activity modifications, such as body mechanics, posture, ergonomics, energy conservation and activity pacing.  Consider addition of complementary or alternative therapy, such as acupuncture, hypnosis or therapeutic touch.  Recent Flowsheet Documentation  Taken 2/11/2025 0800 by Estrella Romero RN  Medication Review/Management: medications reviewed     Problem: Fall Injury Risk  Goal: Absence of Fall and Fall-Related Injury  Outcome: Progressing  Intervention: Identify and Manage Contributors  Description: Develop a fall prevention plan, considering patient-centered interventions and family/caregiver involvement; identify and address patient's facilitators and barriers.  Provide reorientation, appropriate sensory stimulation and routines with changes in mental status to decrease risk of fall.  Promote use of personal vision and auditory aids.  Assess assistance level required for safe and effective self-care; provide support as needed, such as toileting and mobilization. For age 65 and older, implement timed toileting with assistance.  Encourage physical activity, such as performance of mobility and self-care at highest level of patient ability, multicomponent exercise program and provision of appropriate assistive devices.  If fall occurs, assess the severity of injury; implement fall injury protocol. Determine the cause and revise  fall injury prevention plan.  Regularly review and advocate for medication adjustment to decrease fall risk; consider administration times, polypharmacy and age.  Balance adequate pain management with potential for oversedation.  Recent Flowsheet Documentation  Taken 2/11/2025 0800 by Estrella Romero RN  Medication Review/Management: medications reviewed  Intervention: Promote Injury-Free Environment  Description: Provide a safe, barrier-free environment that encourages independent activity.  Keep care area uncluttered and well-lighted.  Determine need for increased observation or monitoring.  Avoid use of devices that minimize mobility, such as restraints or indwelling urinary catheter.  Recent Flowsheet Documentation  Taken 2/11/2025 1800 by Estrella Romero, RN  Safety Promotion/Fall Prevention:   safety round/check completed   room organization consistent   nonskid shoes/slippers when out of bed   fall prevention program maintained   clutter free environment maintained   assistive device/personal items within reach   activity supervised  Taken 2/11/2025 1600 by Estrella Romero, RN  Safety Promotion/Fall Prevention:   safety round/check completed   room organization consistent   nonskid shoes/slippers when out of bed   fall prevention program maintained   clutter free environment maintained   assistive device/personal items within reach   activity supervised  Taken 2/11/2025 1400 by Estrella Romero, RN  Safety Promotion/Fall Prevention:   safety round/check completed   room organization consistent   nonskid shoes/slippers when out of bed   fall prevention program maintained   clutter free environment maintained   assistive device/personal items within reach   activity supervised  Taken 2/11/2025 1200 by Estrella Romero, RN  Safety Promotion/Fall Prevention:   safety round/check completed   room organization consistent   nonskid shoes/slippers when out of bed   fall prevention program  maintained   clutter free environment maintained   assistive device/personal items within reach   activity supervised  Taken 2/11/2025 1000 by Estrella Romero RN  Safety Promotion/Fall Prevention:   safety round/check completed   room organization consistent   nonskid shoes/slippers when out of bed   fall prevention program maintained   clutter free environment maintained   assistive device/personal items within reach   activity supervised  Taken 2/11/2025 0800 by Estrella Romero RN  Safety Promotion/Fall Prevention:   safety round/check completed   room organization consistent   nonskid shoes/slippers when out of bed   fall prevention program maintained   clutter free environment maintained   assistive device/personal items within reach   activity supervised     Problem: Heart Failure  Goal: Optimal Coping  Outcome: Progressing  Intervention: Support Psychosocial Response  Description: Acknowledge, normalize and validate the emotional response to current condition and unpredictable nature of disease progression.  Assess and monitor patient and caregiver perspective on quality of life and personal wellbeing; consider palliative care consult to enhance symptom relief and quality of life.  Engage patient in early and ongoing discussion about goals of care. Facilitate shared decision-making regarding goals and advanced care planning.  Empower participation in planning care and activities, as well as development of attainable goals, to increase self-esteem and feelings of control.  Assist patient and family with life transitions associated with heart failure (e.g., medical regimens, impact on family dynamics and roles, end-of-life care); acknowledge caregiver stress and encourage social connection.  Recognize current coping strategies and assist in developing new strategies (e.g., stress management, mind-body techniques, mindfulness).  Assess and monitor for signs and symptoms of anxiety and  depression.  Connect with community resources for ongoing support, such as cardiac rehabilitation, cognitive behavioral therapy and case management.  Recent Flowsheet Documentation  Taken 2/11/2025 0800 by Estrella Romero RN  Family/Support System Care:   support provided   self-care encouraged  Goal: Optimal Cardiac Output and Blood Flow  Outcome: Progressing  Goal: Stable Heart Rate and Rhythm  Outcome: Progressing  Goal: Fluid and Electrolyte Balance  Outcome: Progressing  Goal: Optimal Functional Ability  Outcome: Progressing  Intervention: Optimize Functional Ability  Description: Assess functional ability, such as ADL (activities of daily living), mobility safety and independence; involve patient and caregiver/family in goal-setting.  Facilitate physical activity and exercise to improve functional ability, cognition and quality of life, as well as to minimize functional decline associated with inactivity; encourage optimal functional mobility.  Consider NMES (neuromuscular electrical stimulation) of the lower extremities for patients with limitations in ability to participate in active exercise.  Monitor physiologic response to activity or exercise and adjust accordingly; encourage and support gradual increase in activity level.  Cluster, coordinate and organize care schedule per patient preference, priorities and tolerance.  Preplan and pace activity; balance activity with periods of rest; incorporate energy-conservation techniques.  Maintain an accessible environment to facilitate safe activity; position for optimal comfort and activity tolerance (e.g., sitting for self-care).  Encourage self-care performance to promote maximum independence in daily activity; provide adaptive equipment and rest periods if needed.  Identify and address body system and performance deficits affecting function, such as cognitive, balance, sensorimotor, activity tolerance, hearing and visual perception impairments.  Recent  Flowsheet Documentation  Taken 2/11/2025 1800 by Estrella Romero RN  Activity Management: activity encouraged  Taken 2/11/2025 1600 by Estrella Romero RN  Activity Management: activity encouraged  Taken 2/11/2025 1400 by Estrella Romero RN  Activity Management: activity encouraged  Taken 2/11/2025 1200 by Estrella Romero RN  Activity Management: activity encouraged  Taken 2/11/2025 1000 by Estrella Romero RN  Activity Management: activity encouraged  Taken 2/11/2025 0800 by Estrella Romero RN  Activity Management: activity encouraged  Goal: Improved Oral Intake  Outcome: Progressing  Goal: Effective Oxygenation and Ventilation  Outcome: Progressing  Intervention: Promote Airway Secretion Clearance  Description: Assess the effectiveness of pulmonary hygiene and ability to perform airway-clearance techniques.  Promote early mobility or ambulation; match activity to ability and tolerance.  Encourage deep breathing and lung expansion therapy to prevent atelectasis; adjust treatment to patient's response.  Initiate cough-enhancement and airway-clearance techniques with instruction.  Consider pharmacologic therapy that may improve mucus clearance, cough response and air flow.  Consider inspiratory muscle training to decrease the risk of pulmonary complications.  Recent Flowsheet Documentation  Taken 2/11/2025 1800 by Estrella Romero RN  Activity Management: activity encouraged  Taken 2/11/2025 1600 by Estrella Romero RN  Activity Management: activity encouraged  Taken 2/11/2025 1400 by Estrella Romero RN  Activity Management: activity encouraged  Taken 2/11/2025 1200 by Estrella Romero RN  Activity Management: activity encouraged  Taken 2/11/2025 1000 by Estrella Romero RN  Activity Management: activity encouraged  Taken 2/11/2025 0800 by Estrella Romero RN  Activity Management: activity encouraged  Intervention: Optimize Oxygenation and Ventilation  Description:  Assess and monitor airway, breathing and circulation for effective oxygenation and ventilation; consider oxygenation and ventilation parameters and goal.  Maintain head of bed elevation with regular position changes to minimize ventilation-perfusion mismatch and breathlessness.  Provide oxygen therapy judiciously to avoid hyperoxemia; adjust to achieve oxygenation goal.  Monitor fluid balance closely to minimize the risk of fluid overload.  Consider positive pressure ventilation to enhance oxygenation and ventilation, as well as reduce work of breathing.  Recent Flowsheet Documentation  Taken 2/11/2025 1800 by Estrella Romero RN  Head of Bed (HOB) Positioning: HOB at 30 degrees  Taken 2/11/2025 1600 by Estrella Romero RN  Head of Bed (HOB) Positioning: HOB at 45 degrees  Taken 2/11/2025 1400 by Estrella Romero RN  Head of Bed (HOB) Positioning: HOB at 30-45 degrees  Taken 2/11/2025 1200 by Estrella Romero RN  Head of Bed (HOB) Positioning: HOB at 30-45 degrees  Taken 2/11/2025 0800 by Estrella Romero RN  Head of Bed (HOB) Positioning: HOB at 45 degrees  Goal: Effective Breathing Pattern During Sleep  Outcome: Progressing  Intervention: Monitor and Manage Obstructive Sleep Apnea  Description: Use a validated screening tool to identify risk for ARIELLA (obstructive sleep apnea).  Implement continuous pulse oximetry to detect apnea-related oxygen desaturation events.  Encourage a nonsupine sleep position to prevent airway collapse, such as side-lying or head of bed elevated.  Minimize use of sedative, opioid and benzodiazepine medication that may contribute to respiratory depression.  Provide oxygen therapy during sleep to reduce apnea-related oxygen desaturation events.  Continue use of home prescribed continuous PAP (positive airway pressure) or other non-PAP therapies during sleep; utilize home device and settings if available.  Implement continuous PAP during sleep for high-risk of ARIELLA;  consider auto-titrating PAP, humidification and mask choice (including fit and style) to improve comfort and tolerance.  Provide behavioral and supportive interventions individualized to the patient needs and preferences to improve PAP adherence.  Facilitate polysomnography (sleep study) referral if needed.  Recent Flowsheet Documentation  Taken 2/11/2025 0800 by Estrella Romero RN  Medication Review/Management: medications reviewed     Problem: Cardiac Output Decreased  Goal: Effective Cardiac Output  Outcome: Progressing  Intervention: Optimize Cardiac Output  Description: Closely monitor weight, intake and output to maintain optimal fluid balance; advocate for adjustment if balance is consistently positive.  Anticipate need for pharmacologic therapy (e.g., diuretic, inotrope, vasopressor, antiarrhythmic, intravenous fluid); monitor effect on stroke volume, heart rhythm and serum electrolytes; manage therapy adjustment.  Implement pharmacologic and nonpharmacologic interventions for pain, comfort and anxiety to avoid cardiac and respiratory compromise.  Maintain optimal position to promote venous return (e.g., elevate head of bed, avoid dependent extremity position).  Encourage activity and mobility to prevent functional decline; match patient's ability and tolerance.  Facilitate sleep/rest patterns that support or enhance activity tolerance.  Decrease energy expenditure (e.g., preplan activity, cluster care considering patient preference).  Maintain normothermia to enhance oxygenation and perfusion.  Optimize nutrition to support energy expenditure; avoid unnecessary dietary restrictions, such as sodium.  Assess for venous thromboembolism risk; provide prophylaxis if required.  Provide respiratory support to optimize oxygenation (e.g., oxygen therapy, assisted ventilation).  Anticipate need for additional therapy, such as cardioversion, cardiac rhythm management device, intra-aortic balloon pump or  ventricular assist device to improve perfusion and hemodynamic stability.  Recent Flowsheet Documentation  Taken 2/11/2025 1800 by Estrella Romero RN  Head of Bed (HOB) Positioning: HOB at 30 degrees  Taken 2/11/2025 1600 by Estrella Romero RN  Head of Bed (HOB) Positioning: HOB at 45 degrees  Taken 2/11/2025 1400 by Estrella Romero RN  Head of Bed (HOB) Positioning: HOB at 30-45 degrees  Taken 2/11/2025 1200 by Estrella Romero RN  Head of Bed (HOB) Positioning: HOB at 30-45 degrees  Taken 2/11/2025 0800 by Estrella Romero RN  Head of Bed (HOB) Positioning: HOB at 45 degrees     Problem: Skin Injury Risk Increased  Goal: Skin Health and Integrity  Outcome: Progressing  Intervention: Optimize Skin Protection  Description: Perform a full pressure injury risk assessment, as indicated by screening, upon admission to care unit.  Reassess skin (full inspection and injury risk, including skin temperature, consistency and color) frequently (e.g., scheduled interval, with change in condition) to provide optimal early detection and prevention.  Maintain adequate tissue perfusion (e.g., encourage fluid balance; avoid crossing legs, constrictive clothing or devices) to promote tissue oxygenation.  Maintain head of bed at lowest degree of elevation tolerated, considering medical condition and other restrictions. Use positioning supports to prevent sliding and friction. Consider low friction textiles.  Avoid positioning onto an area that remains reddened or on bony prominences.  Minimize incontinence and moisture (e.g., toileting schedule; moisture-wicking pad, diaper or incontinence collection device; skin moisture barrier).  Cleanse skin promptly and gently, when soiled, utilizing a pH-balanced cleanser.  Relieve and redistribute pressure (e.g., scheduled position changes, weight shifts, use of support surface, medical device repositioning, protective dressing application, use of positioning device,  microclimate control, use of pressure-injury-monitor  Encourage increased activity, such as sitting in a chair at the bedside or early mobilization, when able to tolerate. Avoid prolonged sitting.  Recent Flowsheet Documentation  Taken 2/11/2025 1800 by Estrelal Romero RN  Activity Management: activity encouraged  Pressure Reduction Techniques:   frequent weight shift encouraged   weight shift assistance provided   pressure points protected  Head of Bed (HOB) Positioning: HOB at 30 degrees  Pressure Reduction Devices: pressure-redistributing mattress utilized  Skin Protection:   transparent dressing maintained   incontinence pads utilized  Taken 2/11/2025 1600 by Estrella Romero RN  Activity Management: activity encouraged  Pressure Reduction Techniques:   frequent weight shift encouraged   weight shift assistance provided   pressure points protected  Head of Bed (HOB) Positioning: HOB at 45 degrees  Pressure Reduction Devices: pressure-redistributing mattress utilized  Skin Protection:   transparent dressing maintained   incontinence pads utilized  Taken 2/11/2025 1400 by Estrella Romero RN  Activity Management: activity encouraged  Pressure Reduction Techniques:   frequent weight shift encouraged   weight shift assistance provided   pressure points protected  Head of Bed (HOB) Positioning: HOB at 30-45 degrees  Pressure Reduction Devices: pressure-redistributing mattress utilized  Skin Protection:   transparent dressing maintained   incontinence pads utilized  Taken 2/11/2025 1200 by Estrella Romero RN  Activity Management: activity encouraged  Pressure Reduction Techniques:   frequent weight shift encouraged   weight shift assistance provided   pressure points protected  Head of Bed (HOB) Positioning: HOB at 30-45 degrees  Pressure Reduction Devices: pressure-redistributing mattress utilized  Skin Protection:   transparent dressing maintained   incontinence pads utilized  Taken 2/11/2025 1000  by Estrella Romero, RN  Activity Management: activity encouraged  Pressure Reduction Techniques:   frequent weight shift encouraged   weight shift assistance provided   pressure points protected  Pressure Reduction Devices: pressure-redistributing mattress utilized  Skin Protection:   transparent dressing maintained   incontinence pads utilized  Taken 2/11/2025 0800 by Estrella Romero, RN  Activity Management: activity encouraged  Pressure Reduction Techniques:   frequent weight shift encouraged   weight shift assistance provided   pressure points protected  Head of Bed (HOB) Positioning: HOB at 45 degrees  Pressure Reduction Devices: pressure-redistributing mattress utilized  Skin Protection:   transparent dressing maintained   incontinence pads utilized     Problem: Noninvasive Ventilation Acute  Goal: Effective Unassisted Ventilation and Oxygenation  Outcome: Progressing

## 2025-02-11 NOTE — PLAN OF CARE
Problem: Noninvasive Ventilation Acute  Goal: Effective Unassisted Ventilation and Oxygenation  Outcome: Progressing  Intervention: Monitor and Manage Noninvasive Ventilation  Flowsheets (Taken 2/11/2025 0027)  NPPV/CPAP Maintenance:   skin (device) pressure points assessed   adjusted   Goal Outcome Evaluation:

## 2025-02-11 NOTE — PLAN OF CARE
Goal Outcome Evaluation:  Plan of Care Reviewed With: patient           Outcome Evaluation: Patient had a difficult time wearing the cpap last night. After several attempts we put his nasal cunnula on for the night. His saturation was in the 90's and did well. He was given morphine one time. NPO since midnight for possible heart cath.

## 2025-02-11 NOTE — PROGRESS NOTES
RT EQUIPMENT DEVICE RELATED - SKIN ASSESSMENT    Bello Score:  Bello Score: 17     RT Medical Equipment/Device:     NIV Mask:  Under-the-nose   size:  b    Skin Assessment:      Nose:  Intact    Device Skin Pressure Protection:  Pressure points protected    Nurse Notification:  Karrie Carmona, RRT

## 2025-02-12 ENCOUNTER — APPOINTMENT (OUTPATIENT)
Dept: CT IMAGING | Facility: HOSPITAL | Age: 85
DRG: 280 | End: 2025-02-12
Payer: MEDICARE

## 2025-02-12 LAB
ACT BLD: 235 SECONDS (ref 82–152)
ANION GAP SERPL CALCULATED.3IONS-SCNC: 9.3 MMOL/L (ref 5–15)
BASOPHILS # BLD AUTO: 0.01 10*3/MM3 (ref 0–0.2)
BASOPHILS NFR BLD AUTO: 0.2 % (ref 0–1.5)
BUN SERPL-MCNC: 27 MG/DL (ref 8–23)
BUN/CREAT SERPL: 18.8 (ref 7–25)
CALCIUM SPEC-SCNC: 8.2 MG/DL (ref 8.6–10.5)
CHLORIDE SERPL-SCNC: 95 MMOL/L (ref 98–107)
CO2 SERPL-SCNC: 28.7 MMOL/L (ref 22–29)
CREAT SERPL-MCNC: 1.44 MG/DL (ref 0.76–1.27)
DEPRECATED RDW RBC AUTO: 40.3 FL (ref 37–54)
EGFRCR SERPLBLD CKD-EPI 2021: 48.5 ML/MIN/1.73
EOSINOPHIL # BLD AUTO: 0 10*3/MM3 (ref 0–0.4)
EOSINOPHIL NFR BLD AUTO: 0 % (ref 0.3–6.2)
ERYTHROCYTE [DISTWIDTH] IN BLOOD BY AUTOMATED COUNT: 12.8 % (ref 12.3–15.4)
GLUCOSE SERPL-MCNC: 126 MG/DL (ref 65–99)
HCT VFR BLD AUTO: 32.1 % (ref 37.5–51)
HGB BLD-MCNC: 11.5 G/DL (ref 13–17.7)
IMM GRANULOCYTES # BLD AUTO: 0.02 10*3/MM3 (ref 0–0.05)
IMM GRANULOCYTES NFR BLD AUTO: 0.4 % (ref 0–0.5)
LYMPHOCYTES # BLD AUTO: 1.31 10*3/MM3 (ref 0.7–3.1)
LYMPHOCYTES NFR BLD AUTO: 28.9 % (ref 19.6–45.3)
MAGNESIUM SERPL-MCNC: 2.8 MG/DL (ref 1.6–2.4)
MCH RBC QN AUTO: 30.7 PG (ref 26.6–33)
MCHC RBC AUTO-ENTMCNC: 35.8 G/DL (ref 31.5–35.7)
MCV RBC AUTO: 85.8 FL (ref 79–97)
MONOCYTES # BLD AUTO: 0.57 10*3/MM3 (ref 0.1–0.9)
MONOCYTES NFR BLD AUTO: 12.6 % (ref 5–12)
NEUTROPHILS NFR BLD AUTO: 2.63 10*3/MM3 (ref 1.7–7)
NEUTROPHILS NFR BLD AUTO: 57.9 % (ref 42.7–76)
NRBC BLD AUTO-RTO: 0 /100 WBC (ref 0–0.2)
PHOSPHATE SERPL-MCNC: 3.7 MG/DL (ref 2.5–4.5)
PLATELET # BLD AUTO: 266 10*3/MM3 (ref 140–450)
PMV BLD AUTO: 9.9 FL (ref 6–12)
POTASSIUM SERPL-SCNC: 3.5 MMOL/L (ref 3.5–5.2)
RBC # BLD AUTO: 3.74 10*6/MM3 (ref 4.14–5.8)
RBC MORPH BLD: NORMAL
SMALL PLATELETS BLD QL SMEAR: ADEQUATE
SODIUM SERPL-SCNC: 133 MMOL/L (ref 136–145)
UFH PPP CHRO-ACNC: 0.41 IU/ML (ref 0.3–0.7)
WBC MORPH BLD: NORMAL
WBC NRBC COR # BLD AUTO: 4.54 10*3/MM3 (ref 3.4–10.8)

## 2025-02-12 PROCEDURE — 99232 SBSQ HOSP IP/OBS MODERATE 35: CPT | Performed by: INTERNAL MEDICINE

## 2025-02-12 PROCEDURE — 25810000003 SODIUM CHLORIDE 0.9 % SOLUTION: Performed by: INTERNAL MEDICINE

## 2025-02-12 PROCEDURE — 93458 L HRT ARTERY/VENTRICLE ANGIO: CPT | Performed by: INTERNAL MEDICINE

## 2025-02-12 PROCEDURE — 99232 SBSQ HOSP IP/OBS MODERATE 35: CPT | Performed by: NURSE PRACTITIONER

## 2025-02-12 PROCEDURE — C1894 INTRO/SHEATH, NON-LASER: HCPCS | Performed by: INTERNAL MEDICINE

## 2025-02-12 PROCEDURE — 85025 COMPLETE CBC W/AUTO DIFF WBC: CPT | Performed by: NURSE PRACTITIONER

## 2025-02-12 PROCEDURE — 75635 CT ANGIO ABDOMINAL ARTERIES: CPT

## 2025-02-12 PROCEDURE — 4A023N7 MEASUREMENT OF CARDIAC SAMPLING AND PRESSURE, LEFT HEART, PERCUTANEOUS APPROACH: ICD-10-PCS | Performed by: INTERNAL MEDICINE

## 2025-02-12 PROCEDURE — 25010000002 LIDOCAINE 1 % SOLUTION: Performed by: INTERNAL MEDICINE

## 2025-02-12 PROCEDURE — 25010000002 HYDRALAZINE PER 20 MG: Performed by: INTERNAL MEDICINE

## 2025-02-12 PROCEDURE — C1769 GUIDE WIRE: HCPCS | Performed by: INTERNAL MEDICINE

## 2025-02-12 PROCEDURE — 85347 COAGULATION TIME ACTIVATED: CPT

## 2025-02-12 PROCEDURE — 25010000002 HEPARIN (PORCINE) PER 1000 UNITS: Performed by: NURSE PRACTITIONER

## 2025-02-12 PROCEDURE — C1887 CATHETER, GUIDING: HCPCS | Performed by: INTERNAL MEDICINE

## 2025-02-12 PROCEDURE — 85520 HEPARIN ASSAY: CPT | Performed by: INTERNAL MEDICINE

## 2025-02-12 PROCEDURE — 85007 BL SMEAR W/DIFF WBC COUNT: CPT | Performed by: NURSE PRACTITIONER

## 2025-02-12 PROCEDURE — 94799 UNLISTED PULMONARY SVC/PX: CPT

## 2025-02-12 PROCEDURE — B2111ZZ FLUOROSCOPY OF MULTIPLE CORONARY ARTERIES USING LOW OSMOLAR CONTRAST: ICD-10-PCS | Performed by: INTERNAL MEDICINE

## 2025-02-12 PROCEDURE — 25010000002 MORPHINE PER 10 MG: Performed by: NURSE PRACTITIONER

## 2025-02-12 PROCEDURE — 84100 ASSAY OF PHOSPHORUS: CPT | Performed by: FAMILY MEDICINE

## 2025-02-12 PROCEDURE — 83735 ASSAY OF MAGNESIUM: CPT | Performed by: FAMILY MEDICINE

## 2025-02-12 PROCEDURE — C1725 CATH, TRANSLUMIN NON-LASER: HCPCS | Performed by: INTERNAL MEDICINE

## 2025-02-12 PROCEDURE — 25510000001 IOPAMIDOL PER 1 ML: Performed by: INTERNAL MEDICINE

## 2025-02-12 PROCEDURE — 25010000002 NITROGLYCERIN 5 MG/ML SOLUTION: Performed by: INTERNAL MEDICINE

## 2025-02-12 PROCEDURE — 25010000002 HEPARIN (PORCINE) PER 1000 UNITS: Performed by: INTERNAL MEDICINE

## 2025-02-12 PROCEDURE — 80048 BASIC METABOLIC PNL TOTAL CA: CPT | Performed by: NURSE PRACTITIONER

## 2025-02-12 PROCEDURE — 25010000002 LORAZEPAM PER 2 MG: Performed by: NURSE PRACTITIONER

## 2025-02-12 PROCEDURE — 25010000002 NITROGLYCERIN 200 MCG/ML SOLUTION

## 2025-02-12 RX ORDER — NITROGLYCERIN 20 MG/100ML
10-50 INJECTION INTRAVENOUS
Status: DISCONTINUED | OUTPATIENT
Start: 2025-02-12 | End: 2025-02-15

## 2025-02-12 RX ORDER — NITROGLYCERIN 20 MG/100ML
INJECTION INTRAVENOUS
Status: COMPLETED
Start: 2025-02-12 | End: 2025-02-12

## 2025-02-12 RX ORDER — ONDANSETRON 4 MG/1
4 TABLET, ORALLY DISINTEGRATING ORAL EVERY 6 HOURS PRN
Status: DISCONTINUED | OUTPATIENT
Start: 2025-02-12 | End: 2025-02-17 | Stop reason: HOSPADM

## 2025-02-12 RX ORDER — BISACODYL 10 MG
10 SUPPOSITORY, RECTAL RECTAL DAILY PRN
Status: DISCONTINUED | OUTPATIENT
Start: 2025-02-12 | End: 2025-02-17 | Stop reason: HOSPADM

## 2025-02-12 RX ORDER — LIDOCAINE HYDROCHLORIDE 10 MG/ML
INJECTION, SOLUTION INFILTRATION; PERINEURAL
Status: DISCONTINUED | OUTPATIENT
Start: 2025-02-12 | End: 2025-02-12 | Stop reason: HOSPADM

## 2025-02-12 RX ORDER — SODIUM CHLORIDE 9 MG/ML
100 INJECTION, SOLUTION INTRAVENOUS CONTINUOUS
Status: ACTIVE | OUTPATIENT
Start: 2025-02-12 | End: 2025-02-12

## 2025-02-12 RX ORDER — SODIUM CHLORIDE 9 MG/ML
250 INJECTION, SOLUTION INTRAVENOUS ONCE AS NEEDED
Status: ACTIVE | OUTPATIENT
Start: 2025-02-12 | End: 2025-02-12

## 2025-02-12 RX ORDER — NICOTINE 21 MG/24HR
1 PATCH, TRANSDERMAL 24 HOURS TRANSDERMAL EVERY 24 HOURS
Status: DISCONTINUED | OUTPATIENT
Start: 2025-02-12 | End: 2025-02-12

## 2025-02-12 RX ORDER — ACETAMINOPHEN 325 MG/1
650 TABLET ORAL EVERY 4 HOURS PRN
Status: DISCONTINUED | OUTPATIENT
Start: 2025-02-12 | End: 2025-02-12

## 2025-02-12 RX ORDER — ROSUVASTATIN CALCIUM 5 MG/1
10 TABLET, COATED ORAL NIGHTLY
Status: DISCONTINUED | OUTPATIENT
Start: 2025-02-12 | End: 2025-02-17 | Stop reason: HOSPADM

## 2025-02-12 RX ORDER — NITROGLYCERIN 0.4 MG/1
0.4 TABLET SUBLINGUAL
Status: DISCONTINUED | OUTPATIENT
Start: 2025-02-12 | End: 2025-02-12

## 2025-02-12 RX ORDER — DIPHENHYDRAMINE HYDROCHLORIDE 50 MG/ML
25 INJECTION INTRAMUSCULAR; INTRAVENOUS EVERY 6 HOURS PRN
Status: DISCONTINUED | OUTPATIENT
Start: 2025-02-12 | End: 2025-02-17 | Stop reason: HOSPADM

## 2025-02-12 RX ORDER — NITROGLYCERIN 5 MG/ML
INJECTION, SOLUTION INTRAVENOUS
Status: DISCONTINUED | OUTPATIENT
Start: 2025-02-12 | End: 2025-02-12 | Stop reason: HOSPADM

## 2025-02-12 RX ORDER — POTASSIUM CHLORIDE 1500 MG/1
40 TABLET, EXTENDED RELEASE ORAL EVERY 4 HOURS
Status: COMPLETED | OUTPATIENT
Start: 2025-02-12 | End: 2025-02-12

## 2025-02-12 RX ORDER — IOPAMIDOL 755 MG/ML
INJECTION, SOLUTION INTRAVASCULAR
Status: DISCONTINUED | OUTPATIENT
Start: 2025-02-12 | End: 2025-02-12 | Stop reason: HOSPADM

## 2025-02-12 RX ORDER — VERAPAMIL HYDROCHLORIDE 2.5 MG/ML
INJECTION, SOLUTION INTRAVENOUS
Status: DISCONTINUED | OUTPATIENT
Start: 2025-02-12 | End: 2025-02-12 | Stop reason: HOSPADM

## 2025-02-12 RX ORDER — LORAZEPAM 2 MG/ML
1 INJECTION INTRAMUSCULAR ONCE
Status: COMPLETED | OUTPATIENT
Start: 2025-02-12 | End: 2025-02-12

## 2025-02-12 RX ORDER — ONDANSETRON 2 MG/ML
4 INJECTION INTRAMUSCULAR; INTRAVENOUS EVERY 6 HOURS PRN
Status: DISCONTINUED | OUTPATIENT
Start: 2025-02-12 | End: 2025-02-17 | Stop reason: HOSPADM

## 2025-02-12 RX ORDER — HEPARIN SODIUM 1000 [USP'U]/ML
INJECTION, SOLUTION INTRAVENOUS; SUBCUTANEOUS
Status: DISCONTINUED | OUTPATIENT
Start: 2025-02-12 | End: 2025-02-12 | Stop reason: HOSPADM

## 2025-02-12 RX ORDER — ALUMINA, MAGNESIA, AND SIMETHICONE 2400; 2400; 240 MG/30ML; MG/30ML; MG/30ML
15 SUSPENSION ORAL EVERY 6 HOURS PRN
Status: DISCONTINUED | OUTPATIENT
Start: 2025-02-12 | End: 2025-02-17 | Stop reason: HOSPADM

## 2025-02-12 RX ORDER — IOPAMIDOL 612 MG/ML
100 INJECTION, SOLUTION INTRAVASCULAR
Status: DISCONTINUED | OUTPATIENT
Start: 2025-02-12 | End: 2025-02-17 | Stop reason: HOSPADM

## 2025-02-12 RX ORDER — HYDRALAZINE HYDROCHLORIDE 20 MG/ML
INJECTION INTRAMUSCULAR; INTRAVENOUS
Status: DISCONTINUED | OUTPATIENT
Start: 2025-02-12 | End: 2025-02-12 | Stop reason: HOSPADM

## 2025-02-12 RX ORDER — TEMAZEPAM 15 MG/1
15 CAPSULE ORAL NIGHTLY PRN
Status: DISCONTINUED | OUTPATIENT
Start: 2025-02-12 | End: 2025-02-12

## 2025-02-12 RX ORDER — ALPRAZOLAM 0.5 MG
0.5 TABLET ORAL 2 TIMES DAILY PRN
Status: DISPENSED | OUTPATIENT
Start: 2025-02-12 | End: 2025-02-17

## 2025-02-12 RX ORDER — FUROSEMIDE 40 MG/1
40 TABLET ORAL DAILY
Status: DISCONTINUED | OUTPATIENT
Start: 2025-02-13 | End: 2025-02-17 | Stop reason: HOSPADM

## 2025-02-12 RX ADMIN — NITROGLYCERIN 20 MCG/MIN: 20 INJECTION INTRAVENOUS at 11:30

## 2025-02-12 RX ADMIN — FENOFIBRATE 145 MG: 145 TABLET, FILM COATED ORAL at 20:58

## 2025-02-12 RX ADMIN — MUPIROCIN 1 APPLICATION: 20 OINTMENT TOPICAL at 21:00

## 2025-02-12 RX ADMIN — HEPARIN SODIUM 12 UNITS/KG/HR: 10000 INJECTION, SOLUTION INTRAVENOUS at 02:03

## 2025-02-12 RX ADMIN — Medication 10 ML: at 21:04

## 2025-02-12 RX ADMIN — ASPIRIN 81 MG: 81 TABLET, COATED ORAL at 20:59

## 2025-02-12 RX ADMIN — MORPHINE SULFATE 1 MG: 2 INJECTION, SOLUTION INTRAMUSCULAR; INTRAVENOUS at 22:21

## 2025-02-12 RX ADMIN — Medication 10 ML: at 08:30

## 2025-02-12 RX ADMIN — MORPHINE SULFATE 1 MG: 2 INJECTION, SOLUTION INTRAMUSCULAR; INTRAVENOUS at 12:36

## 2025-02-12 RX ADMIN — CLOPIDOGREL BISULFATE 75 MG: 75 TABLET ORAL at 08:30

## 2025-02-12 RX ADMIN — SODIUM CHLORIDE 100 ML/HR: 9 INJECTION, SOLUTION INTRAVENOUS at 11:32

## 2025-02-12 RX ADMIN — ALPRAZOLAM 0.5 MG: 0.5 TABLET ORAL at 21:00

## 2025-02-12 RX ADMIN — CARVEDILOL 3.12 MG: 3.12 TABLET, FILM COATED ORAL at 18:11

## 2025-02-12 RX ADMIN — POTASSIUM CHLORIDE 40 MEQ: 1500 TABLET, EXTENDED RELEASE ORAL at 18:12

## 2025-02-12 RX ADMIN — LORAZEPAM 1 MG: 2 INJECTION INTRAMUSCULAR; INTRAVENOUS at 14:00

## 2025-02-12 RX ADMIN — POTASSIUM CHLORIDE 40 MEQ: 1500 TABLET, EXTENDED RELEASE ORAL at 12:36

## 2025-02-12 RX ADMIN — ROSUVASTATIN CALCIUM 10 MG: 5 TABLET, FILM COATED ORAL at 20:57

## 2025-02-12 RX ADMIN — SACUBITRIL AND VALSARTAN 1 TABLET: 24; 26 TABLET, FILM COATED ORAL at 20:58

## 2025-02-12 NOTE — PROGRESS NOTES
Patient Identification:  Name:  Abebe Crockett  Age:  84 y.o.  Sex:  male  :  1940  MRN:  [id number]  Visit Number:  [id number]      Patient's acute and chronic issues:  1.  CAD:   - Cth 2025 showed 80% prox and mid LAD lesion.   2.  Cardiomyopathy  - LVEF 30-35% (echo 2025)  -Despite his LAD lesion I suspect he primarily has nonischemic cardiomyopathy (LV dysfunction is out of proportion to underlying CAD)  3.  Systolic heart failure (newly diagnosed)  4.  HTN  5.  HLD: , , HDL 27,  (2025)  6.  DM2  7.  CKD: Creatinine 1.43/GFR 48.9 (2/10/2025)  8.  Influenza A  - Primary cardiologist: Dr. Myles     Today's visit  Patient underwent cardiac catheterization today  - Diagnostic angiogram shows 80% proximal and mid LAD lesion.  No lesion in LCx or RCA.  - PCI was attempted, unfortunately procedure complicated by right femoral artery dissection therefore further attempt was aborted.    At present clinically he is doing well  - No chest pain, no abdominal pain, no groin pain.  - States that shortness of breath is better.    BP remains elevated.          Assessment and plan:  1.  Coronary artery disease  - 2025 showed 80% proximal LAD and 80% mid LAD lesion.  - During coronary intervention, procedure complicated by femoral artery dissection hence further attempt was aborted.  - Currently chest pain-free  - Continue medical therapy: Aspirin 81, Plavix 75, beta-blocker, statin (Lipitor 40)  - Will reattempt PCI on a later date.     2.  Cardiomyopathy: LVEF 30-35%  - Despite his LAD disease, suspect he primarily has nonischemic cardiomyopathy from hypertension.  As his LV dysfunction is out of proportion to underlying CAD.  -Recommend guideline directed medical therapy  - BB: Coreg 3.125 BID.  Did not tolerate higher dose/cause bradycardia.  - ARNI: Suggest Entresto 24/26 BID, titrate this up as allowed by BP.  - SGLT2 inhibitor: Jardiance 10  - MRA: Aldactone next if allowed by  BP and renal function.  - Diuretic: Lasix 40 IV twice daily.     3.  Systolic heart failure, acute  - See discussion under #2     4.  HTN  - BP remains uncontrolled.  Target BP less than 130/80  - On beta-blocker and ARNI, titrate this up as needed.  - If additional BP control is needed then add hydralazine and isosorbide.  - Prefer to avoid calcium channel blocker given his LV dysfunction.     5.  HLD  - Patient is only on fenofibrate, triglyceride level is acceptable  - His LDL is elevated, he has underlying CAD.  Suggest initiating statin: Lipitor 40.     6.  DM2  - Plan per medicine team     7.  CKD  - GFR 48.9  - Continue to monitor closely as patient is being diuresed.     8. Influenza A/bronchitis  - Plan per medicine team     ================================================================================     Cardiac and pertinent studies.  EKG  - 2/9/2025: NSR 88.  Frequent PVC.  Subtle ST depression in lead V5 and V6  Echo  - 2/5/2025: LVEF 30-35%, global hypokinesis.  Grade 2 DD.  RV SF normal.  No AV stenosis/regurgitation.  No MV stenosis/trace regurgitation.  - 12/20/2019: LVEF 65-70%, grade 1 DD F.  Mild LVH.  RV SF normal.  Stress test: None  Cardiac cath:   -2/12/2025: 80% proximal LAD and 80% mid LAD lesion.  LI in LCx (Co-dominant), LI in proximal/mid RCA, 40% lesion in distal RCA.  Chest CT  - 2//2025: No PE.  Findings suggestive of heart failure.      [Transfer Hold] aspirin, 81 mg, Oral, Nightly  [Held by provider] carvedilol, 3.125 mg, Oral, BID With Meals  [Transfer Hold] clopidogrel, 75 mg, Oral, Daily  [Held by provider] empagliflozin, 10 mg, Oral, Daily  [Transfer Hold] fenofibrate, 145 mg, Oral, Nightly  [Transfer Hold] furosemide, 40 mg, Intravenous, BID Diuretics  [Transfer Hold] predniSONE, 40 mg, Oral, Daily With Breakfast  [Transfer Hold] sacubitril-valsartan, 1 tablet, Oral, Q12H  [Transfer Hold] senna-docusate sodium, 2 tablet, Oral, BID  [Transfer Hold] sodium chloride, 10 mL,  Intravenous, Q12H      heparin, 12 Units/kg/hr, Last Rate: 12 Units/kg/hr (02/12/25 0203)  Pharmacy to Dose Heparin,         Vital Signs:  Temp:  [97.5 °F (36.4 °C)-98.2 °F (36.8 °C)] 98 °F (36.7 °C)  Heart Rate:  [56-73] 73  Resp:  [12-20] 15  BP: (131-209)/(60-92) 185/81      02/09/25  1223 02/09/25  1556   Weight: 74.4 kg (164 lb) 74.5 kg (164 lb 3.9 oz)     Body mass index is 25.72 kg/m².    Intake/Output Summary (Last 24 hours) at 2/12/2025 1106  Last data filed at 2/12/2025 0312  Gross per 24 hour   Intake 440 ml   Output 1850 ml   Net -1410 ml     NPO Diet NPO Type: Strict NPO    Physical exam:  Constitutional:    HENT:  Head:  Normocephalic and atraumatic.    Eyes:  No scleral icterus.    Neck:  No JVD present.    Cardiovascular: Regular rhythm, S1 S2+,   Pulmonary/Chest:  Breath sounds B/L  Abdominal:  Soft.    Neurological:  Alert and oriented x 3. No focal deficits.  Skin:  Skin is warm and dry.       Results from last 7 days   Lab Units 02/09/25  1955 02/09/25  1351 02/09/25  1248   HSTROP T ng/L 689* 787* 768*     Results from last 7 days   Lab Units 02/12/25  0410 02/11/25  1419 02/11/25  0653 02/10/25  0557 02/09/25  1248   WBC 10*3/mm3 4.54 3.43 3.62   < > 3.82   HEMOGLOBIN g/dL 11.5* 13.5 11.8*   < > 12.9*   HEMATOCRIT % 32.1* 37.9 34.2*   < > 37.6   MCV fL 85.8 86.3 86.8   < > 89.1   MCHC g/dL 35.8* 35.6 34.5   < > 34.3   PLATELETS 10*3/mm3 266 266 243   < > 279   INR   --   --   --   --  1.04    < > = values in this interval not displayed.         Results from last 7 days   Lab Units 02/12/25  0411 02/11/25  1941 02/11/25  0653 02/10/25  0557 02/09/25  1248   SODIUM mmol/L 133*  --  136 137 135*   POTASSIUM mmol/L 3.5 3.9 3.5 4.0 4.3   MAGNESIUM mg/dL 2.8*  --  1.9 2.0  --    CHLORIDE mmol/L 95*  --  97* 99 100   CO2 mmol/L 28.7  --  28.5 27.6 26.1   BUN mg/dL 27*  --  26* 28* 28*   CREATININE mg/dL 1.44*  --  1.47* 1.43* 1.47*   CALCIUM mg/dL 8.2*  --  8.4* 8.6 9.0   GLUCOSE mg/dL 126*  --  102*  "100* 195*   ALBUMIN g/dL  --   --   --   --  3.5   BILIRUBIN mg/dL  --   --   --   --  0.4   ALK PHOS U/L  --   --   --   --  39   AST (SGOT) U/L  --   --   --   --  42*   ALT (SGPT) U/L  --   --   --   --  25   Estimated Creatinine Clearance: 41.7 mL/min (A) (by C-G formula based on SCr of 1.44 mg/dL (H)).    No results found for: \"AMMONIA\"  Results from last 7 days   Lab Units 02/09/25  1351   CHOLESTEROL mg/dL 189   TRIGLYCERIDES mg/dL 155*   HDL CHOL mg/dL 27*   LDL CHOL mg/dL 134*     No results found for: \"BLOODCX\"  No results found for: \"URINECX\"  No results found for: \"WOUNDCX\"  No results found for: \"STOOLCX\"    I have personally looked at the labs and they are summarized above.      Imaging Results (Last 24 Hours)       Procedure Component Value Units Date/Time    CT Angio Abdominal Aorta Bilateral Iliofem Runoff [[id number]] Resulted: 02/12/25 1103     Updated: 02/12/25 1058            Assessment and Plan:  See discussion above.      Benjamin Le MD   02/12/25  11:06 EST  .            "

## 2025-02-12 NOTE — CASE MANAGEMENT/SOCIAL WORK
Continued Stay Note  [facility]     Patient Name: Abebe Crockett  MRN: [id number]  Today's Date: 2/12/2025    Admit Date: 2/9/2025    Plan: DC plan pending at this time   Discharge Plan       Row Name 02/12/25 1457       Plan    Plan DC plan pending at this time                   Discharge Codes    No documentation.                       Cherry Fragoso RN

## 2025-02-12 NOTE — PLAN OF CARE
Goal Outcome Evaluation:  Plan of Care Reviewed With: patient        Progress: improving  Outcome Evaluation: Patient rested well overnight. Vital signs have remained stable and will continue to monitor. Possible heart cath on Wednesday. No overnight events to report.

## 2025-02-12 NOTE — THERAPY EVALUATION
PT evaluation orders received. PT evaluation held today as pt is not medically appropriate for skilled evaluation at this time. PT will follow up tomorrow.

## 2025-02-12 NOTE — PROCEDURES
CARDIAC CATHETERIZATION / INTERVENTION REPORT             DATE OF PROCEDURE: 2/12/2025       INDICATION FOR PROCEDURE:   Newly diagnosed cardiomyopathy: LVEF 30-35%.  - 84-year-old gentleman with a history of hypertension, hyperlipidemia, DM2, CKD presents with dyspnea on exertion  - He has been positive for influenza A  - His echo showed LVEF 30-35% which is a new finding for him and he has been diagnosed with acute systolic heart failure.  Due to his LV dysfunction patient was brought in for coronary angiogram.        PRE PROCEDURE DIAGNOSIS:  1.  Cardiomyopathy: LVEF 30-35% per echo (new diagnosis  2.  Systolic heart failure.      POST PROCEDURE DIAGNOSIS:  1.  80% proximal LAD followed by 80% mid LAD lesion.  2.  Luminal irregularities in LCx/OM  3.  Luminal Ehret is in proximal/mid RCA.  40% lesion at distal RCA.  4.  LVEDP 20 mmHg.         PROCEDURE PERFORMED:   1. Selective Coronary Angiogram  2. Left heart catheretization    3. Attempted coronary angiogram. However, during procedure there was right femoral artery dissection.  As a result of further attempt was aborted.      DESCRIPTION OF PROCEDURE:  Prior to the procedure risk, benefits and possible alternative were discussed with the patient and informed consent was obtained. Patient was brought to cardiac cath lab table in post absorbtive state. Patient was prepped and drape in usual sterile fashion.       Initially we obtained right radial artery access.  Right wrist was infiltrated with a 2% lidocaine injection.  Using ultrasound guidance right radial artery was puncture with a micropuncture and over modified Seldinger technique and a 6 [place] arterial sheath was inserted.    Left main was engaged with a JL 3.5 catheter and angiograms were taken multiple views.  RCA was engaged with a AR mod catheter angiogram surgical multiple views.  The diagnostic catheter was used to enter LV and LV pressures were measured.  LV gram was not performed  as patient has chronic kidney disease.      DIAGNOSTIC FINDINGS:  1.  Left main: Large caliber vessel free of disease.  Bifurcates into LAD and LCx arteries.    2.  Left anterior descending artery: Large-caliber vessel proximally, medium caliber and mid to distal segment wraps around the apex.  There is 2 tandem lesions.  80% proximal LAD followed by 80% tubular mid LAD lesion.  D1 is a medium to small caliber vessel with 80% proximal lesion.  D2 is a medium to small caliber vessel free of disease.    2.  Left circumflex artery: Is a codominant vessel.  LCx proper is a large-caliber vessel.  Distally runs in the AV groove and supplies a medium caliber posterolateral branch.  OM is the major obtuse marginal has a very high takeoff.  There is luminal irregardless without any significant lesion in LCx proper or OM branches.    3.  Right coronary artery: Medium caliber vessel.  No records in proximal and mid RCA.  Distal RCA  has 40% lesion.  RPDA is a small caliber vessel.  There is no significant RPL.    4.  Left heart catheterization: LV systolic pressure 180 mmHg.  LVEDP 20 mmHg.  No gradient across aortic valve.    5.  Left ventriculogram: Not done due to patient's known history of chronic kidney disease.        DESCRIPTION OF CORONARY INTERVENTION:   Diagnostic angiogram shows 80% proximal and 80% mid LAD lesion.  We decided to perform percutaneous coronary intervention of this lesion.    Initial attempt at PCI was done through right radial artery access.  A CLS 3 point guide was used to engage left main.  Patient does have tortuous right subclavian artery and guide support was suboptimal.  A BMW wire was advanced into the LAD and lesions were crossed.  A 2.5 x 15 balloon was also advanced for a support.  Unfortunately at this time I lost guide position as well as lost coronary wire position.    Due to tortuous subclavian artery and poor guide support, decided to proceed with femoral artery access.    Groin was  infiltrated with a 2% lichen injection.  Under sound guidance, right femoral artery was punctured with a micropuncture needle for modified Seldinger technique and a 6 [place] arterial sheath were inserted.    However it was difficult to advance J-wire through the femoral sheath.  Angiogram shows there is a femoral artery dissection (likely retrograde).  Given access site dissection we decided to abort further procedure.    Wire was removed.  Femoral arterial sheath was sutured in, this will be removed with manual compression for hemostasis.  Radial sheath was removed and hemostasis obtained using a TR band.    Patient has no chest pain abdominal pain or groin site pain.    COMPLICATIONS : Femoral artery access site dissection.      MISCELLANEOUS:  Clinical frailty: 3- Managing Well  ASA: 3=3- Severe systemic disease that results in functional limitation  Mallampati: Class 2=2- Able to visualize the soft palate, fauces, uvula.  The anterior & posterior tonsilar pillars are hidden by the tongue.  EBL: Less than 20 CC      ASSESSMENT:  80% proximal LAD and 80% mid LAD lesion.  Luminal irregularities in LCx/OM  Luminal irregularities in proximal/mid RCA.  40% distal RCA lesion.  LV systolic pressure 180 mmHg.  LVEDP 20 mmHg.      PLAN:  1.  Difficult guide support from radial artery access.  2.  During femoral artery access inadvertent femoral artery dissection.  Further attempts at PCI was aborted.  3.  Will get a CT scan of abdomen and femoral artery.  4.  Medical therapy for now.  5.  Reattempted PCI after femoral artery dissection heals.            Benjamin Le MD  02/12/25  10:36 EST

## 2025-02-12 NOTE — PROGRESS NOTES
[facility] HOSPITALIST    PROGRESS NOTE    Name:  Abebe Crockett   Age:  84 y.o.  Sex:  male  :  1940  MRN:  [id number]   Visit Number:  [id number]  Admission Date:  2025  Date Of Service:  25  Primary Care Physician:  Dre Henry MD     LOS: 2 days :    Chief Complaint:      Shortness of air    Subjective:    Patient seen and examined with wife and family at bedside.  He is status post cardiac catheterization with unfortunate right femoral artery dissection.  Updated on plan of care with all questions answered.  Entresto medication education given to family, agreeable at this time.    Hospital Course:    Mr. 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  Patient presented to emergency department due to worsening shortness of air.  Patient denied associated chest pain, nausea, vomiting, or jaw pain.  Worsening dyspnea noted with ambulation.    Upon ED presentation patient hypertensive with blood pressure 169/105, shortly thereafter requiring BiPAP due to severe dyspnea.  Pertinent labs and imaging noted initial troponin 768 with repeat 797 and then 689, creatinine 1.4 which is baseline, proBNP 12,000 with prior 14,000, chest x-ray noting mild CHF with significant improvement.  Hospitalist consulted for further medical management.  Cardiology consulted.  Patient initiated on heparin infusion/continued beta-blocker/aspirin.  Due to patient requiring BiPAP, currently able to lay flat for cardiac catheterization.  Cardiology suggested increasing Lasix, will schedule cardiac catheterization as patient respiratory status improves.  Entresto initiated.    Patient taken for cardiac catheterization with 80% proximal LAD and mid LAD lesions noted.   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  CTA noted findings concerning for right common femoral and external iliac artery with contained dissections surrounding femoral vascular sheath no evidence of extension of dissection.  Patient transferred to ICU for further observation.  Heparin infusion discontinued.  Gentle fluids given due to CKD and contrast.  Per cardiology will reattempt PCI after femoral artery dissection heals, medical therapy for now.  Continued on aspirin/Plavix/statin.    Review of Systems:     All systems were reviewed and negative except as mentioned in subjective, assessment and plan.    Vital Signs:    Temp:  [97.5 °F (36.4 °C)-98.2 °F (36.8 °C)] 97.6 °F (36.4 °C)  Heart Rate:  [56-80] 80  Resp:  [12-20] 15  BP: (131-209)/(60-92) 158/72    Intake and output:    I/O last 3 completed shifts:  In: 440 [P.O.:440]  Out: 3950 [Urine:3950]  No intake/output data recorded.    Physical Examination:    General Appearance:  Alert and cooperative.  Chronically ill-appearing middle-age male.  Appears agitated.   Head:  Atraumatic and normocephalic.   Eyes: Conjunctivae and sclerae normal, no icterus. No pallor.   Throat: No oral lesions, no thrush, oral mucosa moist.   Neck: Supple, trachea midline, no thyromegaly.   Lungs:   Breath sounds heard bilaterally equally.  On home CPAP.   Heart:  Normal S1 and S2, no murmur, no gallop, no rub. No JVD.   Abdomen:   Normal bowel sounds, no masses, no organomegaly. Soft, nontender, nondistended, no rebound tenderness.   Extremities: Supple, no edema, no cyanosis, no clubbing.  Right femoral sheath intact.  No obvious ecchymosis.  Neurovascular intact.   Skin: No bleeding or rash.   Neurologic: Alert. No facial asymmetry. Moves all four limbs. No tremors.  Generalized weakness.     Laboratory results:    Results from last 7 days   Lab Units 02/12/25  0411 02/11/25  1941 02/11/25  0626  "02/10/25  0557 02/09/25  1248   SODIUM mmol/L 133*  --  136 137 135*   POTASSIUM mmol/L 3.5 3.9 3.5 4.0 4.3   CHLORIDE mmol/L 95*  --  97* 99 100   CO2 mmol/L 28.7  --  28.5 27.6 26.1   BUN mg/dL 27*  --  26* 28* 28*   CREATININE mg/dL 1.44*  --  1.47* 1.43* 1.47*   CALCIUM mg/dL 8.2*  --  8.4* 8.6 9.0   BILIRUBIN mg/dL  --   --   --   --  0.4   ALK PHOS U/L  --   --   --   --  39   ALT (SGPT) U/L  --   --   --   --  25   AST (SGOT) U/L  --   --   --   --  42*   GLUCOSE mg/dL 126*  --  102* 100* 195*     Results from last 7 days   Lab Units 02/12/25  0410 02/11/25  1419 02/11/25  0653   WBC 10*3/mm3 4.54 3.43 3.62   HEMOGLOBIN g/dL 11.5* 13.5 11.8*   HEMATOCRIT % 32.1* 37.9 34.2*   PLATELETS 10*3/mm3 266 266 243     Results from last 7 days   Lab Units 02/09/25  1248   INR  1.04     Results from last 7 days   Lab Units 02/09/25  1955 02/09/25  1351 02/09/25  1248   HSTROP T ng/L 689* 787* 768*           No results for input(s): \"PHART\", \"ROA6YCL\", \"PO2ART\", \"HNS3JQL\", \"BASEEXCESS\" in the last 8760 hours.   I have reviewed the patient's laboratory results.    Radiology results:    CT Angio Abdominal Aorta Bilateral Iliofem Runoff    Result Date: 2/12/2025  CT ANGIOGRAPHY ABDOMEN, PELVIS, EXTREMITIES BILATERALLY     2/12/2025 10:37 AM  HISTORY: Claudication. Possible dissection; J10.1-Influenza due to other identified influenza virus with other respiratory manifestations; R79.89-Other specified abnormal findings of blood chemistry; I21.4-Non-ST elevation (NSTEMI) myocardial infarction; I50.21-Acute systolic (congestive) heart failure  PROCEDURE: Pre-contrast, Post-contrast images of the abdomen , pelvis and extremities were performed by computed tomography. Extensive 3 D reconstruction images were performed. A CTA was performed. This study was performed with techniques to keep radiation doses as low as reasonably achievable, (ALARA). Individualized dose reduction techniques using automated exposure control or " adjustment of mA and/or kV according to the patient size were employed.  COMPARISON: CT chest 2/4/2025.  FINDINGS: Abdomen and Pelvis: Vascular: Abdominal aorta is normal in caliber. No evidence of aortic dissection or aneurysm. Mild aortic atherosclerotic calcifications.  Celiac axis and its branches are patent.  Superior mesenteric artery and its branches are patent.  Inferior mesenteric artery and its branches are patent.  Single right renal artery is patent.  Single left renal artery is patent.   Non-vascular: Mild cardiomegaly. Small hiatal hernia. Moderate right pleural effusion. Small left pleural effusion. Adjacent lung atelectasis. Cirrhosis. Left hemiliver 9 mm hyperenhancing focus (series 4, image 79), indeterminate. Mild heterogeneous enhancement of the hepatic parenchyma. There is a 20 mm heterogeneously hypodense lesion in the caudate lobe (series 4, image 101), indeterminate, nonemergent MRI liver mass protocol is recommended. Gallbladder is normal in size. No biliary ductal dilatation. Pancreatic parenchymal atrophy. 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 Consider nonemergent MRI pancreatic mass protocol for further evaluation. Spleen is normal in size. Splenic granulomatosis. Small splenule. No adrenal nodule. Bilateral renal parenchymal scarring. Bilateral renal cysts, largest 1 in the left renal inferior pole measuring up to 6.1 cm. 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 Small hiatal hernia. No small bowel dilatation. Right inguinal hernia containing loops of small bowel without evidence of complications. Colonic diverticulosis without evidence of acute diverticulitis. Prostatomegaly. Urinary bladder is distended without obvious abnormality. Trace bilateral pleural effusions. There is prominent enhancement surrounding the left testicle, suggestive of possible varicocele. Consider nonemergent testicular  ultrasound for further evaluation.  Lower Extremities:  Right Lower Extremity: Mild calcific and noncalcific atherosclerotic disease of the entire left lower extremity arterial system.  Right Common Iliac Artery: Patent.  Right External Iliac Artery: Patent. There is extension of dissection flap along the medial aspect of the external iliac artery (series 6, image 139-154).  Right Common Femoral Artery: Patent. Right common femoral artery vascular access sheath in place with tip in the right external iliac artery. There is a linear intraluminal filling defect surrounding the vascular sheath (series 4, image [id number]; series 7, image 75), which is extending into the external iliac artery, compatible with intimal flap, concerning for possible contained dissection involving the common femoral and external iliac artery. No arterial occlusion.  Right Deep Femoral Artery: Patent.  Right Superficial Femoral Artery: Patent.  Right Popliteal Artery: Patent.  Right Anterior Tibial Artery: Patent. Moderate multifocal stenosis of the anterior tibial artery.  Right Tibioperoneal Trunk: Patent.  Right Posterior Tibial Artery: Patent.  Right Peroneal Artery: Patent.  Right Non-Vascular: Non-contributory.    Left Lower Extremity: Mild calcific and noncalcific atherosclerotic disease of the entire left lower extremity arterial system.  Left Common Iliac Artery: Patent.  Left External Iliac Artery: Patent.  Left Common Femoral Artery: Patent.  Left Deep Femoral Artery: Patent.  Left Superficial Femoral Artery: Patent.  Left Popliteal Artery: Patent.  Left Anterior Tibial Artery: Patent. Moderate multifocal stenosis of the anterior tibial artery.  Left Tibioperoneal Trunk: Patent.  Left Posterior Tibial Artery: Patent.  Left Peroneal Artery: Patent.  Left Non-Vascular: Non-contributory.       Impression: Findings are concerning for right common femoral and external iliac artery contained dissection surrounding the femoral vascular  sheath with extension of the intimal flap into the proximal external iliac artery. There is no evidence of extension of the dissection flap into right common iliac artery or aorta. No evidence of vascular occlusion. Correlate clinically.  Other findings as above.  These findings were discussed with MAKENZIE KERN MD on 2/12/2025 11:31 AM by SENDY Santos, over the phone.   This study was performed with techniques to keep radiation doses as low as reasonably achievable (ALARA). Individualized dose reduction techniques using automated exposure control or adjustment of mA and/or kV according to the patient size were employed.    This report was signed and finalized on 2/12/2025 11:37 AM by SENDY Santos.      Cardiac Catheterization/Vascular Study    Result Date: 2/12/2025  ASSESSMENT: 1.  80% proximal LAD and 80% mid LAD lesion. 2. Luminal irregularities in LCx/OM 3.  Luminal irregularities in proximal/mid RCA.  40% distal RCA lesion. 4. LV systolic pressure 180 mmHg.  LVEDP 20 mmHg.  (LV gram not done due to underlying CKD)   PLAN: 1.  Difficult guide support from radial artery access. 2.  During femoral artery access inadvertent femoral artery dissection.  Further attempts at PCI was aborted. 3.  Will get a CT scan of abdomen and femoral artery. 4.  Medical therapy for now. 5.  Reattempted PCI after femoral artery dissection heals.    I have reviewed the patient's radiology reports.    Medication Review:     I have reviewed the patient's active and prn medications.     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  -Continue DuoNebs.  -No known lung disease.  No history of tobacco use.  -CTA chest with prior admission negative for PE.  --No indication for Tamiflu at this time as previously completed.    Heart failure/NSTEMI  -Continue GDMT as patient able to tolerate.  Will need to monitor kidney function closely.  -Echo noted combined heart failure with EF 30 to 35%.  -Patient appears to be euvolemic.  -Monitor bradycardia-continue Coreg.  -Entresto initiated.  -Heparin infusion discontinued due to femoral artery dissection.  -Continue aspirin and Plavix-Crestor initiated.  -Continue Jardiance.  -Patient overall volume status appears improved from prior hospitalization.  -Cardiology following closely in unit at this time due to right femoral artery dissection.  -80% proximal and mid LAD lesions noted unable to stent.  Will reattempt on Friday.  -Kidney function will need to be monitored closely.  If worsening will consider nephrology consult.      I have reviewed the copied text and it is accurate as of 2/12/2025     DVT Prophylaxis: SCDs  Code Status: Code  Diet: Cardiac/consistent carb.  Discharge Plan: TBD    Lurdes Bhatti, APRN  02/12/25  12:54 EST    Dictated utilizing Dragon dictation.        Attending   Attestation       I have performed an independent face-to-face diagnostic evaluation including performing an independent physical examination.  I approve of the documented plan of care above that was reviewed and developed with the advanced practice clinician (APC) and take responsibility for that plan along with its associated risks.  I have updated the HPI as appropriate.    Brief HPI    Mr. Crockett is a pleasant 84-year-old male presented to emergency department due to worsening shortness of air.  Patient denied associated chest pain, nausea, vomiting, or jaw pain.  Worsening dyspnea noted with  ambulation.Upon ED presentation patient hypertensive with blood pressure 169/105, shortly thereafter requiring BiPAP due to severe dyspnea.  Pertinent labs and imaging noted initial troponin 768 with repeat 797 and then 689, creatinine 1.4 which is baseline, proBNP 12,000 with prior 14,000, chest x-ray noting mild CHF with significant improvement.  Hospitalist consulted for further medical management.  Cardiology consulted.  Patient initiated on heparin infusion/continued beta-blocker/aspirin.  Patient taken for cardiac catheterization with 80% proximal LAD and mid LAD lesions noted.  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  CTA noted findings concerning for right common femoral and external iliac artery with contained dissections surrounding femoral vascular sheath no evidence of extension of dissection.  Patient transferred to ICU for further observation.  Heparin infusion discontinued.  Gentle fluids given due to CKD and contrast.  Per cardiology will reattempt PCI after femoral artery dissection heals, medical therapy for now.  Continued on aspirin/Plavix/statin.    Patient tolerated procedure well, with noted complications. Complications are minor, patient should recover without issue, and PCI likely to be re-attempted. Patient denied chest pain during examination. Continue with medical management. Appreciate updates and recommendations from Cardiology. We will continue to monitor.     Attending Physical Exam:  Temp:  [97.5 °F (36.4 °C)-98.2 °F (36.8 °C)] 97.6 °F (36.4 °C)  Heart Rate:  [56-80] 80  Resp:  [12-20] 15  BP: (131-209)/(60-92) 158/72  Flow (L/min) (Oxygen Therapy):  [3] 3  Constitutional: Awake, alert, chronically ill-appearing  Eyes: PERRLA, sclerae anicteric, no conjunctival injection  HENT: NCAT, mucous membranes moist  Neck: Supple, no thyromegaly, no lymphadenopathy, trachea midline  Respiratory: Clear to  auscultation bilaterally, nonlabored respirations   Cardiovascular: RRR, no murmurs, rubs, or gallops, palpable pedal pulses bilaterally  Gastrointestinal: Positive bowel sounds, soft, nontender, nondistended  Musculoskeletal: No bilateral ankle edema, no clubbing or cyanosis to extremities  Psychiatric: Appropriate affect, cooperative  Neurologic: Oriented x 3, strength symmetric in all extremities, Cranial Nerves grossly intact to confrontation, speech clear  Skin: No rashes.  Right femoral sheath noted, secured, clean/dry/intact.    Result Review:  I have personally reviewed the results from the time of this admission to 2/12/2025 13:46 EST and agree with these findings:  [x]  Laboratory list / accordion  [x]  Microbiology  [x]  Radiology  [x]  EKG/Telemetry   [x]  Cardiology/Vascular   []  Pathology  [x]  Old records  []  Other:  Most notable findings include: Summarized above    Assessment and Plan:    See assessment and plan documented by APC above and updated/edited by me as appropriate.    Elvin Gallegos DO  02/12/25

## 2025-02-13 LAB
ALBUMIN SERPL-MCNC: 2.9 G/DL (ref 3.5–5.2)
ANION GAP SERPL CALCULATED.3IONS-SCNC: 9.8 MMOL/L (ref 5–15)
BASOPHILS # BLD AUTO: 0.02 10*3/MM3 (ref 0–0.2)
BASOPHILS NFR BLD AUTO: 0.4 % (ref 0–1.5)
BUN SERPL-MCNC: 27 MG/DL (ref 8–23)
BUN/CREAT SERPL: 18.5 (ref 7–25)
CALCIUM SPEC-SCNC: 8.2 MG/DL (ref 8.6–10.5)
CHLORIDE SERPL-SCNC: 100 MMOL/L (ref 98–107)
CO2 SERPL-SCNC: 24.2 MMOL/L (ref 22–29)
CREAT SERPL-MCNC: 1.46 MG/DL (ref 0.76–1.27)
DEPRECATED RDW RBC AUTO: 41.4 FL (ref 37–54)
EGFRCR SERPLBLD CKD-EPI 2021: 47.7 ML/MIN/1.73
EOSINOPHIL # BLD AUTO: 0.03 10*3/MM3 (ref 0–0.4)
EOSINOPHIL NFR BLD AUTO: 0.5 % (ref 0.3–6.2)
ERYTHROCYTE [DISTWIDTH] IN BLOOD BY AUTOMATED COUNT: 13.2 % (ref 12.3–15.4)
GLUCOSE SERPL-MCNC: 94 MG/DL (ref 65–99)
HCT VFR BLD AUTO: 31.1 % (ref 37.5–51)
HGB BLD-MCNC: 11 G/DL (ref 13–17.7)
IMM GRANULOCYTES # BLD AUTO: 0.03 10*3/MM3 (ref 0–0.05)
IMM GRANULOCYTES NFR BLD AUTO: 0.5 % (ref 0–0.5)
LYMPHOCYTES # BLD AUTO: 1.15 10*3/MM3 (ref 0.7–3.1)
LYMPHOCYTES NFR BLD AUTO: 20.5 % (ref 19.6–45.3)
MCH RBC QN AUTO: 30.6 PG (ref 26.6–33)
MCHC RBC AUTO-ENTMCNC: 35.4 G/DL (ref 31.5–35.7)
MCV RBC AUTO: 86.6 FL (ref 79–97)
MONOCYTES # BLD AUTO: 0.72 10*3/MM3 (ref 0.1–0.9)
MONOCYTES NFR BLD AUTO: 12.9 % (ref 5–12)
NEUTROPHILS NFR BLD AUTO: 3.65 10*3/MM3 (ref 1.7–7)
NEUTROPHILS NFR BLD AUTO: 65.2 % (ref 42.7–76)
NRBC BLD AUTO-RTO: 0 /100 WBC (ref 0–0.2)
PHOSPHATE SERPL-MCNC: 3.3 MG/DL (ref 2.5–4.5)
PLATELET # BLD AUTO: 287 10*3/MM3 (ref 140–450)
PMV BLD AUTO: 10.1 FL (ref 6–12)
POTASSIUM SERPL-SCNC: 3.6 MMOL/L (ref 3.5–5.2)
POTASSIUM SERPL-SCNC: 4.9 MMOL/L (ref 3.5–5.2)
RBC # BLD AUTO: 3.59 10*6/MM3 (ref 4.14–5.8)
SODIUM SERPL-SCNC: 134 MMOL/L (ref 136–145)
WBC NRBC COR # BLD AUTO: 5.6 10*3/MM3 (ref 3.4–10.8)

## 2025-02-13 PROCEDURE — 63710000001 PREDNISONE PER 1 MG: Performed by: NURSE PRACTITIONER

## 2025-02-13 PROCEDURE — 84132 ASSAY OF SERUM POTASSIUM: CPT | Performed by: FAMILY MEDICINE

## 2025-02-13 PROCEDURE — 80069 RENAL FUNCTION PANEL: CPT | Performed by: NURSE PRACTITIONER

## 2025-02-13 PROCEDURE — 25010000002 MORPHINE PER 10 MG: Performed by: NURSE PRACTITIONER

## 2025-02-13 PROCEDURE — 99232 SBSQ HOSP IP/OBS MODERATE 35: CPT | Performed by: FAMILY MEDICINE

## 2025-02-13 PROCEDURE — 85025 COMPLETE CBC W/AUTO DIFF WBC: CPT | Performed by: NURSE PRACTITIONER

## 2025-02-13 RX ORDER — SACUBITRIL AND VALSARTAN 24; 26 MG/1; MG/1
2 TABLET, FILM COATED ORAL EVERY 12 HOURS SCHEDULED
Status: DISCONTINUED | OUTPATIENT
Start: 2025-02-13 | End: 2025-02-13

## 2025-02-13 RX ORDER — POTASSIUM CHLORIDE 1500 MG/1
40 TABLET, EXTENDED RELEASE ORAL EVERY 4 HOURS
Status: COMPLETED | OUTPATIENT
Start: 2025-02-13 | End: 2025-02-13

## 2025-02-13 RX ORDER — SACUBITRIL AND VALSARTAN 24; 26 MG/1; MG/1
2 TABLET, FILM COATED ORAL EVERY 12 HOURS SCHEDULED
Status: DISCONTINUED | OUTPATIENT
Start: 2025-02-13 | End: 2025-02-17 | Stop reason: HOSPADM

## 2025-02-13 RX ADMIN — SENNOSIDES AND DOCUSATE SODIUM 2 TABLET: 50; 8.6 TABLET ORAL at 09:35

## 2025-02-13 RX ADMIN — Medication 10 ML: at 21:05

## 2025-02-13 RX ADMIN — SACUBITRIL AND VALSARTAN 2 TABLET: 24; 26 TABLET, FILM COATED ORAL at 21:07

## 2025-02-13 RX ADMIN — MORPHINE SULFATE 1 MG: 2 INJECTION, SOLUTION INTRAMUSCULAR; INTRAVENOUS at 02:23

## 2025-02-13 RX ADMIN — Medication 10 ML: at 09:35

## 2025-02-13 RX ADMIN — FENOFIBRATE 145 MG: 145 TABLET, FILM COATED ORAL at 21:05

## 2025-02-13 RX ADMIN — FUROSEMIDE 40 MG: 40 TABLET ORAL at 09:35

## 2025-02-13 RX ADMIN — CARVEDILOL 3.12 MG: 3.12 TABLET, FILM COATED ORAL at 09:35

## 2025-02-13 RX ADMIN — MUPIROCIN 1 APPLICATION: 20 OINTMENT TOPICAL at 21:05

## 2025-02-13 RX ADMIN — ROSUVASTATIN CALCIUM 10 MG: 5 TABLET, FILM COATED ORAL at 21:05

## 2025-02-13 RX ADMIN — POTASSIUM CHLORIDE 40 MEQ: 1500 TABLET, EXTENDED RELEASE ORAL at 09:35

## 2025-02-13 RX ADMIN — CLOPIDOGREL BISULFATE 75 MG: 75 TABLET ORAL at 09:35

## 2025-02-13 RX ADMIN — CARVEDILOL 3.12 MG: 3.12 TABLET, FILM COATED ORAL at 17:32

## 2025-02-13 RX ADMIN — MUPIROCIN 1 APPLICATION: 20 OINTMENT TOPICAL at 09:35

## 2025-02-13 RX ADMIN — ASPIRIN 81 MG: 81 TABLET, COATED ORAL at 21:05

## 2025-02-13 RX ADMIN — PREDNISONE 40 MG: 20 TABLET ORAL at 09:35

## 2025-02-13 RX ADMIN — SACUBITRIL AND VALSARTAN 2 TABLET: 24; 26 TABLET, FILM COATED ORAL at 10:10

## 2025-02-13 RX ADMIN — POTASSIUM CHLORIDE 40 MEQ: 1500 TABLET, EXTENDED RELEASE ORAL at 14:52

## 2025-02-13 RX ADMIN — EMPAGLIFLOZIN 10 MG: 10 TABLET, FILM COATED ORAL at 09:35

## 2025-02-13 NOTE — PLAN OF CARE
Goal Outcome Evaluation:              Outcome Evaluation: Patient alert and oriented, restless/anxious during the shift, sinus arrhythmia with PVCs & PACs, O2 > 92% on 2 L nasal cannula, nitroglycerin drip infusing, patient's wife at bedside, and patient sleeping in-between care during the shift.

## 2025-02-13 NOTE — PLAN OF CARE
Goal Outcome Evaluation:  Plan of Care Reviewed With: patient        Progress: improving     Able to titrate off of nitro gtt this shift. Able to ambulate to recliner in room. No C/O chest pain.

## 2025-02-13 NOTE — PROGRESS NOTES
Patient Identification:  Name:  Abebe Crockett  Age:  84 y.o.  Sex:  male  :  1940  MRN:  [id number]  Visit Number:  [id number]        Patient's acute and chronic issues  1.  CAD:   - Cth 2025 showed 80% prox and mid LAD lesion.   2.  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    3.  Systolic heart failure (newly diagnosed)  4.  HTN  5.  HLD: , , HDL 27,  (2025)  6.  DM2  7.  CKD: Creatinine 1.43/GFR 48.9 (2/10/2025)  8.  Influenza A  - Primary cardiologist: Dr. Myles     Today's visit :   Clinically patient is doing better  - No chest pain  - No short of breath  - No abdominal pain  - No right groin area pain.    BP has improved though not fully controlled  - Remains on nitro drip.             Assessment and Plan  1.  Coronary artery disease  - 2025 showed 80% proximal LAD and 80% mid LAD lesion.  - During coronary intervention (2025)  procedure ws complicated by femoral artery dissection hence further attempt was aborted.  - Currently chest pain-free  - Continue medical therapy: Aspirin 81, Plavix 75, beta-blocker, statin (Lipitor 40)    - I have offered to reschedule case tomorrow (Friday).    - However patient wipes to hold off.  They want to discuss with Dr. Myles (will be back on Monday) for further scheduling of coronary intervention procedure.  - Patient denies any anginal symptoms.  No need for urgent intervention at this time.     2.  Cardiomyopathy: LVEF 30-35%  - Likely combination of ischemic and nonischemic (due to hypertension) myopathy as his LV dysfunction is out of proportion to underlying CAD.    -Recommend guideline directed medical therapy  - BB: Coreg 3.125 BID.  Did not tolerate higher dose/cause bradycardia.  - ARNI: Suggest Entresto 49/51 BID, titrate this up as allowed by BP.  - SGLT2 inhibitor: Jardiance 10  - MRA: Aldactone next if allowed by BP and renal function.  - Diuretic:  Lasix 40 IV twice daily.     3.  Systolic heart failure, acute  - See discussion under #2     4.  HTN  - BP has improved but not fully controlled.  Target BP less than 130/80  - On beta-blocker and ARNI, titrate this up as needed.  - If additional BP control is needed then add hydralazine and isosorbide.  - Prefer to avoid calcium channel blocker given his LV dysfunction.     5.  HLD  - Patient is only on fenofibrate, triglyceride level is acceptable  - His LDL is elevated, he has underlying CAD.  Suggest initiating statin: Lipitor 40.     6.  DM2  - Plan per medicine team     7.  CKD  - GFR 48.9  - Continue to monitor closely as patient is being diuresed.     8. Influenza A/bronchitis  - Plan per medicine team      Type 2 MI due to demand ischemia from coronary artery disease      ================================================================================     Cardiac and pertinent studies:  EKG  - 2/9/2025: NSR 88.  Frequent PVC.  Subtle ST depression in lead V5 and V6  Echo  - 2/5/2025: LVEF 30-35%, global hypokinesis.  Grade 2 DD.  RV SF normal.  No AV stenosis/regurgitation.  No MV stenosis/trace regurgitation.  - 12/20/2019: LVEF 65-70%, grade 1 DD F.  Mild LVH.  RV SF normal.  Stress test: None  Cardiac cath:   -2/12/2025: 80% proximal LAD and 80% mid LAD lesion.  LI in LCx (Co-dominant), LI in proximal/mid RCA, 40% lesion in distal RCA.  Chest CT  - 2//2025: No PE.  Findings suggestive of heart failure.  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  There is no evidence of extension of the dissection flap into right  common iliac artery or aorta. No evidence of vascular occlusion.      aspirin, 81 mg, Oral, Nightly  carvedilol, 3.125 mg, Oral, BID With Meals  clopidogrel, 75 mg, Oral, Daily  empagliflozin, 10 mg, Oral, Daily  fenofibrate, 145 mg,  Oral, Nightly  furosemide, 40 mg, Oral, Daily  iopamidol, 100 mL, Intravenous, Once in imaging  mupirocin, 1 Application, Each Nare, BID  potassium chloride ER, 40 mEq, Oral, Q4H  predniSONE, 40 mg, Oral, Daily With Breakfast  rosuvastatin, 10 mg, Oral, Nightly  sacubitril-valsartan, 2 tablet, Oral, Q12H  senna-docusate sodium, 2 tablet, Oral, BID  sodium chloride, 10 mL, Intravenous, Q12H      nitroglycerin, 10-50 mcg/min, Last Rate: 20 mcg/min (02/13/25 0222)        Vital Signs:  Temp:  [97.6 °F (36.4 °C)-98.2 °F (36.8 °C)] 97.9 °F (36.6 °C)  Heart Rate:  [58-88] 80  Resp:  [16-19] 18  BP: ()/() 123/102      02/09/25  1556 02/12/25  1133 02/13/25  0530   Weight: 74.5 kg (164 lb 3.9 oz) 70.1 kg (154 lb 8.7 oz) 71 kg (156 lb 8.4 oz)     Body mass index is 24.52 kg/m².    Intake/Output Summary (Last 24 hours) at 2/13/2025 1031  Last data filed at 2/13/2025 0530  Gross per 24 hour   Intake 709.03 ml   Output 825 ml   Net -115.97 ml     Diet: Cardiac; Healthy Heart (2-3 Na+); Fluid Consistency: Thin (IDDSI 0)    Physical exam:  Constitutional:    HENT:  Head:  Normocephalic and atraumatic.    Eyes:  No scleral icterus.    Neck:  No JVD present.    Cardiovascular: Regular rhythm  Pulmonary/Chest:  Breath sounds B/L  Abdominal:  Soft.    Neurological:  Alert and oriented x 3.   Skin:  Skin is warm and dry.       Results from last 7 days   Lab Units 02/09/25  1955 02/09/25  1351 02/09/25  1248   HSTROP T ng/L 689* 787* 768*     Results from last 7 days   Lab Units 02/13/25  0739 02/12/25  0410 02/11/25  1419 02/10/25  0557 02/09/25  1248   WBC 10*3/mm3 5.60 4.54 3.43   < > 3.82   HEMOGLOBIN g/dL 11.0* 11.5* 13.5   < > 12.9*   HEMATOCRIT % 31.1* 32.1* 37.9   < > 37.6   MCV fL 86.6 85.8 86.3   < > 89.1   MCHC g/dL 35.4 35.8* 35.6   < > 34.3   PLATELETS 10*3/mm3 287 266 266   < > 279   INR   --   --   --   --  1.04    < > = values in this interval not displayed.         Results from last 7 days   Lab Units  "02/13/25  0739 02/12/25  0411 02/11/25  1941 02/11/25  0653 02/10/25  0557 02/09/25  1248   SODIUM mmol/L 134* 133*  --  136 137 135*   POTASSIUM mmol/L 3.6 3.5 3.9 3.5 4.0 4.3   MAGNESIUM mg/dL  --  2.8*  --  1.9 2.0  --    CHLORIDE mmol/L 100 95*  --  97* 99 100   CO2 mmol/L 24.2 28.7  --  28.5 27.6 26.1   BUN mg/dL 27* 27*  --  26* 28* 28*   CREATININE mg/dL 1.46* 1.44*  --  1.47* 1.43* 1.47*   CALCIUM mg/dL 8.2* 8.2*  --  8.4* 8.6 9.0   GLUCOSE mg/dL 94 126*  --  102* 100* 195*   ALBUMIN g/dL 2.9*  --   --   --   --  3.5   BILIRUBIN mg/dL  --   --   --   --   --  0.4   ALK PHOS U/L  --   --   --   --   --  39   AST (SGOT) U/L  --   --   --   --   --  42*   ALT (SGPT) U/L  --   --   --   --   --  25   Estimated Creatinine Clearance: 39.2 mL/min (A) (by C-G formula based on SCr of 1.46 mg/dL (H)).    No results found for: \"AMMONIA\"  Results from last 7 days   Lab Units 02/09/25  1351   CHOLESTEROL mg/dL 189   TRIGLYCERIDES mg/dL 155*   HDL CHOL mg/dL 27*   LDL CHOL mg/dL 134*     No results found for: \"BLOODCX\"  No results found for: \"URINECX\"  No results found for: \"WOUNDCX\"  No results found for: \"STOOLCX\"    I have personally looked at the labs and they are summarized above.      Imaging Results (Last 24 Hours)       Procedure Component Value Units Date/Time    CT Angio Abdominal Aorta Bilateral Iliofem Runoff [[id number]] Collected: 02/12/25 1126     Updated: 02/12/25 1139    Narrative:      CT ANGIOGRAPHY ABDOMEN, PELVIS, EXTREMITIES BILATERALLY     2/12/2025  10:37 AM     HISTORY: Claudication. Possible dissection; J10.1-Influenza due to other  identified influenza virus with other respiratory manifestations;  R79.89-Other specified abnormal findings of blood chemistry;  I21.4-Non-ST elevation (NSTEMI) myocardial infarction; I50.21-Acute  systolic (congestive) heart failure     PROCEDURE: Pre-contrast, Post-contrast images of the abdomen , pelvis  and extremities were performed by computed tomography. " Extensive 3 D  reconstruction images were performed. A CTA was performed. This study  was performed with techniques to keep radiation doses as low as  reasonably achievable, (ALARA). Individualized dose reduction techniques  using automated exposure control or adjustment of mA and/or kV according  to the patient size were employed.     COMPARISON:  CT chest 2/4/2025.     FINDINGS:  Abdomen and Pelvis:  Vascular:   Abdominal aorta is normal in caliber. No evidence of aortic dissection  or aneurysm. Mild aortic atherosclerotic calcifications.     Celiac axis and its branches are patent.     Superior mesenteric artery and its branches are patent.     Inferior mesenteric artery and its branches are patent.     Single right renal artery is patent.     Single left renal artery is patent.        Non-vascular: Mild cardiomegaly. Small hiatal hernia. Moderate right  pleural effusion. Small left pleural effusion. Adjacent lung  atelectasis. Cirrhosis. Left hemiliver 9 mm hyperenhancing focus (series  4, image 79), indeterminate. Mild heterogeneous enhancement of the  hepatic parenchyma. There is a 20 mm heterogeneously hypodense lesion in  the caudate lobe (series 4, image 101), indeterminate, nonemergent MRI  liver mass protocol is recommended. Gallbladder is normal in size. No  biliary ductal dilatation. Pancreatic parenchymal atrophy. 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 Consider nonemergent MRI pancreatic mass protocol for  further evaluation. Spleen is normal in size. Splenic granulomatosis.  Small splenule. No adrenal nodule. Bilateral renal parenchymal scarring.  Bilateral renal cysts, largest 1 in the left renal inferior pole  measuring up to 6.1 cm. 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 Small hiatal hernia. No small  bowel dilatation. Right inguinal hernia containing loops of small bowel  without evidence of  complications. Colonic diverticulosis without  evidence of acute diverticulitis. Prostatomegaly. Urinary bladder is  distended without obvious abnormality. Trace bilateral pleural  effusions. There is prominent enhancement surrounding the left testicle,  suggestive of possible varicocele. Consider nonemergent testicular  ultrasound for further evaluation.     Lower Extremities:     Right Lower Extremity:  Mild calcific and noncalcific atherosclerotic disease of the entire left  lower extremity arterial system.     Right Common Iliac Artery: Patent.     Right External Iliac Artery: Patent. There is extension of dissection  flap along the medial aspect of the external iliac artery (series 6,  image [id number]).     Right Common Femoral Artery: Patent. Right common femoral artery  vascular access sheath in place with tip in the right external iliac  artery. There is a linear intraluminal filling defect surrounding the  vascular sheath (series 4, image [id number]; series 7, image 75), which is  extending into the external iliac artery, compatible with intimal flap,  concerning for possible contained dissection involving the common  femoral and external iliac artery. No arterial occlusion.     Right Deep Femoral Artery: Patent.     Right Superficial Femoral Artery: Patent.     Right Popliteal Artery: Patent.     Right Anterior Tibial Artery: Patent. Moderate multifocal stenosis of  the anterior tibial artery.     Right Tibioperoneal Trunk: Patent.     Right Posterior Tibial Artery: Patent.     Right Peroneal Artery: Patent.     Right Non-Vascular: Non-contributory.           Left Lower Extremity:  Mild calcific and noncalcific atherosclerotic disease of the entire left  lower extremity arterial system.     Left Common Iliac Artery: Patent.     Left External Iliac Artery: Patent.     Left Common Femoral Artery: Patent.     Left Deep Femoral Artery: Patent.     Left Superficial Femoral Artery: Patent.     Left Popliteal Artery:  Patent.     Left Anterior Tibial Artery: Patent. Moderate multifocal stenosis of the  anterior tibial artery.     Left Tibioperoneal Trunk: Patent.     Left Posterior Tibial Artery: Patent.     Left Peroneal Artery: Patent.     Left Non-Vascular: Non-contributory.          Impression:      Findings are concerning for right common femoral and external iliac  artery contained dissection surrounding the femoral vascular sheath with  extension of the intimal flap into the proximal external iliac artery.  There is no evidence of extension of the dissection flap into right  common iliac artery or aorta. No evidence of vascular occlusion.  Correlate clinically.     Other findings as above.     These findings were discussed with MAKENZIE LE MD on 2/12/2025 11:31 AM  by SENDY Santos, over the phone.        This study was performed with techniques to keep radiation doses as low  as reasonably achievable (ALARA). Individualized dose reduction  techniques using automated exposure control or adjustment of mA and/or  kV according to the patient size were employed.           This report was signed and finalized on 2/12/2025 11:37 AM by SENDY Santos.               Assessment and Plan:  See discussion above      Makenzie Le MD   02/13/25  10:31 EST  .

## 2025-02-13 NOTE — PROGRESS NOTES
[facility] HOSPITALIST    PROGRESS NOTE    Name:  Abebe Crockett   Age:  84 y.o.  Sex:  male  :  1940  MRN:  [id number]   Visit Number:  [id number]  Admission Date:  2025  Date Of Service:  25  Primary Care Physician:  Dre Henry MD     LOS: 3 days :    Chief Complaint:      Shortness of air     Subjective:    Patient was seen examined bedside today.  Patient sitting up comfortably in bed and eating on his meal with no distress.  Patient tolerating p.o. well.  Patient denies any chest pain, shortness of breath or discomfort.  Patient has mild wheezing but otherwise denies any acute complaints today.  He is currently on 2 L nasal cannula.  Wife at bedside.  Discussed recommendations from cardiology, they prefer to wait until seeing Dr. Myles who is his primary cardiologist.  Patient otherwise denies any extremity (femoral/radial) pain.    Hospital Course:    Mr. 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  Patient presented to emergency department due to worsening shortness of air.  Patient denied associated chest pain, nausea, vomiting, or jaw pain.  Worsening dyspnea noted with ambulation.     Upon ED presentation patient hypertensive with blood pressure 169/105, shortly thereafter requiring BiPAP due to severe dyspnea.  Pertinent labs and imaging noted initial troponin 768 with repeat 797 and then 689, creatinine 1.4 which is baseline, proBNP 12,000 with prior 14,000, chest x-ray noting mild CHF with significant improvement.  Hospitalist consulted for further medical management.  Cardiology consulted.  Patient initiated on heparin infusion/continued beta-blocker/aspirin.  Due to patient requiring BiPAP, currently able to lay flat for  cardiac catheterization.  Cardiology suggested increasing Lasix, will schedule cardiac catheterization as patient respiratory status improves.  Entresto initiated.     Patient taken for cardiac catheterization with 80% proximal LAD and mid LAD lesions noted.  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  CTA noted findings concerning for right common femoral and external iliac artery with contained dissections surrounding femoral vascular sheath no evidence of extension of dissection.  Patient transferred to ICU for further observation.  Heparin infusion discontinued.  Gentle fluids given due to CKD and contrast.  Per cardiology will reattempt PCI after femoral artery dissection heals, medical therapy for now.  Continued on aspirin/Plavix/statin.    Review of Systems:     All systems were reviewed and negative except as mentioned in subjective, assessment and plan.    Vital Signs:    Temp:  [97.6 °F (36.4 °C)-98.2 °F (36.8 °C)] 97.9 °F (36.6 °C)  Heart Rate:  [58-88] 60  Resp:  [15-19] 18  BP: ()/() 137/67    Intake and output:    I/O last 3 completed shifts:  In: 709 [P.O.:50; I.V.:659]  Out: 1975 [Urine:1975]  No intake/output data recorded.    Physical Examination:    General Appearance:  Alert and cooperative.  Chronically ill-appearing.  No acute distress.  Generalized weakness noted.   Head:  Atraumatic and normocephalic.   Eyes: Conjunctivae and sclerae normal, no icterus. No pallor.   Throat: No oral lesions, no thrush, oral mucosa moist.   Neck: Supple, trachea midline, no thyromegaly.   Lungs:   Breath sounds heard bilaterally equally.  Prolonged expiration.  Bilateral scattered wheezing.  No crackles.  Unlabored.  On supplemental oxygen.   Heart:  Normal S1 and S2, no murmur, no gallop, no rub. No JVD.   Abdomen:   Normal bowel sounds, no masses, no organomegaly. Soft, nontender, nondistended, no rebound tenderness.  "  Extremities: Supple, no edema, no cyanosis, no clubbing. Right femoral sheath intact. No obvious ecchymosis. Neurovascular intact.    Skin: No bleeding or rash.   Neurologic: Alert and oriented x 3. No facial asymmetry. Moves all four limbs. No tremors.      Laboratory results:    Results from last 7 days   Lab Units 02/13/25  0739 02/12/25  0411 02/11/25  1941 02/11/25  0653 02/10/25  0557 02/09/25  1248   SODIUM mmol/L 134* 133*  --  136   < > 135*   POTASSIUM mmol/L 3.6 3.5 3.9 3.5   < > 4.3   CHLORIDE mmol/L 100 95*  --  97*   < > 100   CO2 mmol/L 24.2 28.7  --  28.5   < > 26.1   BUN mg/dL 27* 27*  --  26*   < > 28*   CREATININE mg/dL 1.46* 1.44*  --  1.47*   < > 1.47*   CALCIUM mg/dL 8.2* 8.2*  --  8.4*   < > 9.0   BILIRUBIN mg/dL  --   --   --   --   --  0.4   ALK PHOS U/L  --   --   --   --   --  39   ALT (SGPT) U/L  --   --   --   --   --  25   AST (SGOT) U/L  --   --   --   --   --  42*   GLUCOSE mg/dL 94 126*  --  102*   < > 195*    < > = values in this interval not displayed.     Results from last 7 days   Lab Units 02/13/25  0739 02/12/25  0410 02/11/25  1419   WBC 10*3/mm3 5.60 4.54 3.43   HEMOGLOBIN g/dL 11.0* 11.5* 13.5   HEMATOCRIT % 31.1* 32.1* 37.9   PLATELETS 10*3/mm3 287 266 266     Results from last 7 days   Lab Units 02/09/25  1248   INR  1.04     Results from last 7 days   Lab Units 02/09/25 1955 02/09/25  1351 02/09/25  1248   HSTROP T ng/L 689* 787* 768*         No results for input(s): \"PHART\", \"JET0BEB\", \"PO2ART\", \"MDN6OCP\", \"BASEEXCESS\" in the last 8760 hours.   I have reviewed the patient's laboratory results.    Radiology results:    CT Angio Abdominal Aorta Bilateral Iliofem Runoff    Result Date: 2/12/2025  CT ANGIOGRAPHY ABDOMEN, PELVIS, EXTREMITIES BILATERALLY     2/12/2025 10:37 AM  HISTORY: Claudication. Possible dissection; J10.1-Influenza due to other identified influenza virus with other respiratory manifestations; R79.89-Other specified abnormal findings of blood chemistry; " I21.4-Non-ST elevation (NSTEMI) myocardial infarction; I50.21-Acute systolic (congestive) heart failure  PROCEDURE: Pre-contrast, Post-contrast images of the abdomen , pelvis and extremities were performed by computed tomography. Extensive 3 D reconstruction images were performed. A CTA was performed. This study was performed with techniques to keep radiation doses as low as reasonably achievable, (ALARA). Individualized dose reduction techniques using automated exposure control or adjustment of mA and/or kV according to the patient size were employed.  COMPARISON: CT chest 2/4/2025.  FINDINGS: Abdomen and Pelvis: Vascular: Abdominal aorta is normal in caliber. No evidence of aortic dissection or aneurysm. Mild aortic atherosclerotic calcifications.  Celiac axis and its branches are patent.  Superior mesenteric artery and its branches are patent.  Inferior mesenteric artery and its branches are patent.  Single right renal artery is patent.  Single left renal artery is patent.   Non-vascular: Mild cardiomegaly. Small hiatal hernia. Moderate right pleural effusion. Small left pleural effusion. Adjacent lung atelectasis. Cirrhosis. Left hemiliver 9 mm hyperenhancing focus (series 4, image 79), indeterminate. Mild heterogeneous enhancement of the hepatic parenchyma. There is a 20 mm heterogeneously hypodense lesion in the caudate lobe (series 4, image 101), indeterminate, nonemergent MRI liver mass protocol is recommended. Gallbladder is normal in size. No biliary ductal dilatation. Pancreatic parenchymal atrophy. 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 Consider nonemergent MRI pancreatic mass protocol for further evaluation. Spleen is normal in size. Splenic granulomatosis. Small splenule. No adrenal nodule. Bilateral renal parenchymal scarring. Bilateral renal cysts, largest 1 in the left renal inferior pole measuring up to 6.1 cm. 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 Small hiatal hernia. No small bowel dilatation. Right inguinal hernia containing loops of small bowel without evidence of complications. Colonic diverticulosis without evidence of acute diverticulitis. Prostatomegaly. Urinary bladder is distended without obvious abnormality. Trace bilateral pleural effusions. There is prominent enhancement surrounding the left testicle, suggestive of possible varicocele. Consider nonemergent testicular ultrasound for further evaluation.  Lower Extremities:  Right Lower Extremity: Mild calcific and noncalcific atherosclerotic disease of the entire left lower extremity arterial system.  Right Common Iliac Artery: Patent.  Right External Iliac Artery: Patent. There is extension of dissection flap along the medial aspect of the external iliac artery (series 6, image 139-154).  Right Common Femoral Artery: Patent. Right common femoral artery vascular access sheath in place with tip in the right external iliac artery. There is a linear intraluminal filling defect surrounding the vascular sheath (series 4, image 333-352; series 7, image 75), which is extending into the external iliac artery, compatible with intimal flap, concerning for possible contained dissection involving the common femoral and external iliac artery. No arterial occlusion.  Right Deep Femoral Artery: Patent.  Right Superficial Femoral Artery: Patent.  Right Popliteal Artery: Patent.  Right Anterior Tibial Artery: Patent. Moderate multifocal stenosis of the anterior tibial artery.  Right Tibioperoneal Trunk: Patent.  Right Posterior Tibial Artery: Patent.  Right Peroneal Artery: Patent.  Right Non-Vascular: Non-contributory.    Left Lower Extremity: Mild calcific and noncalcific atherosclerotic disease of the entire left lower extremity arterial system.  Left Common Iliac Artery: Patent.  Left External Iliac Artery: Patent.  Left Common Femoral Artery: Patent.  Left  Deep Femoral Artery: Patent.  Left Superficial Femoral Artery: Patent.  Left Popliteal Artery: Patent.  Left Anterior Tibial Artery: Patent. Moderate multifocal stenosis of the anterior tibial artery.  Left Tibioperoneal Trunk: Patent.  Left Posterior Tibial Artery: Patent.  Left Peroneal Artery: Patent.  Left Non-Vascular: Non-contributory.       Impression: Findings are concerning for right common femoral and external iliac artery contained dissection surrounding the femoral vascular sheath with extension of the intimal flap into the proximal external iliac artery. There is no evidence of extension of the dissection flap into right common iliac artery or aorta. No evidence of vascular occlusion. Correlate clinically.  Other findings as above.  These findings were discussed with MAKENZIE KERN MD on 2/12/2025 11:31 AM by SENDY Santos, over the phone.   This study was performed with techniques to keep radiation doses as low as reasonably achievable (ALARA). Individualized dose reduction techniques using automated exposure control or adjustment of mA and/or kV according to the patient size were employed.    This report was signed and finalized on 2/12/2025 11:37 AM by SENDY Santos.      Cardiac Catheterization/Vascular Study    Result Date: 2/12/2025  ASSESSMENT: 1.  80% proximal LAD and 80% mid LAD lesion. 2. Luminal irregularities in LCx/OM 3.  Luminal irregularities in proximal/mid RCA.  40% distal RCA lesion. 4. LV systolic pressure 180 mmHg.  LVEDP 20 mmHg.  (LV gram not done due to underlying CKD)   PLAN: 1.  Difficult guide support from radial artery access. 2.  During femoral artery access inadvertent femoral artery dissection.  Further attempts at PCI was aborted. 3.  Will get a CT scan of abdomen and femoral artery. 4.  Medical therapy for now. 5.  Reattempted PCI after femoral artery dissection heals.    I have reviewed the patient's radiology reports.    Medication Review:     I have  reviewed the patient's active and prn medications.     Problem List:      NSTEMI (non-ST elevated myocardial infarction)    Acute systolic heart failure      Assessment:    Acute respiratory failure with hypoxia, POA  NSTEMI  Combined heart failure exacerbation with reduced EF, POA  Influenza A  Chronic kidney disease stage III  Type 2 diabetes mellitus  Hyperlipidemia  Hypertension    Plan:    Respiratory failure/influenza A  -Supplemental oxygen to keep saturation above 90%, patient is not on oxygen at baseline, titrate down as able to.  Currently requiring 2 L nasal cannula  -Prednisone for mild scattered wheezing and acute bronchitis likely due to underlying influenza A.  -Continue DuoNebs.  -No known lung disease.  No history of tobacco use.  -CTA chest with prior admission negative for PE.  -No indication for Tamiflu at this time as previously completed.  -PT/OT consulted     Heart failure/NSTEMI  -Continue GDMT as patient able to tolerate.    -Echo noted combined heart failure with EF 30 to 35%.  -Patient appears to be euvolemic.  -Monitor bradycardia-continue Coreg.  -Entresto initiated.  -Heparin infusion discontinued per cardiology commendations  -Continue aspirin and Plavix-Crestor initiated.  -Continue Jardiance.  -Patient overall volume status appears improved from prior hospitalization.  -Cardiology following closely in unit at this time due to right femoral artery dissection.  Per cardiology Dr. Le no further treatment needed for dissection and will heal on its own.  -80% proximal and mid LAD lesions noted unable to stent.  Plan was to reattempt heart cath on Friday.    -I personally discussed with Dr. Le with cardiologist, after Dr. Le discussed with the patient and his wife at bedside regarding need for reattempt heart cath, they prefer to wait until his dissection heals and prefer to discuss with Dr. Myles who is his primary cardiologist prior to attempting heart cath again.  Patient currently  with no chest pain.  -Kidney function will need to be monitored closely.  If worsening will consider nephrology consult.  -Currently on nitro drip for blood pressure control, hopefully can come off of nitro drip with restarting his blood pressure medicine.     I have reviewed the copied text and it is accurate as of 2/13/2025      DVT Prophylaxis: SCD/heparin infusion  Code Status: Code  Diet: Cardiac/consistent carb  Discharge Plan: TBD, likely needs 2 to 3 days in the hospital    Giuliana Galvan MD  02/13/25  09:42 EST    Dictated utilizing Dragon dictation.

## 2025-02-14 LAB
ANION GAP SERPL CALCULATED.3IONS-SCNC: 11 MMOL/L (ref 5–15)
BASOPHILS # BLD AUTO: 0.01 10*3/MM3 (ref 0–0.2)
BASOPHILS NFR BLD AUTO: 0.1 % (ref 0–1.5)
BUN SERPL-MCNC: 27 MG/DL (ref 8–23)
BUN/CREAT SERPL: 18 (ref 7–25)
CALCIUM SPEC-SCNC: 8.2 MG/DL (ref 8.6–10.5)
CHLORIDE SERPL-SCNC: 103 MMOL/L (ref 98–107)
CO2 SERPL-SCNC: 21 MMOL/L (ref 22–29)
CREAT SERPL-MCNC: 1.5 MG/DL (ref 0.76–1.27)
DEPRECATED RDW RBC AUTO: 42.5 FL (ref 37–54)
EGFRCR SERPLBLD CKD-EPI 2021: 46.2 ML/MIN/1.73
EOSINOPHIL # BLD AUTO: 0.02 10*3/MM3 (ref 0–0.4)
EOSINOPHIL NFR BLD AUTO: 0.2 % (ref 0.3–6.2)
ERYTHROCYTE [DISTWIDTH] IN BLOOD BY AUTOMATED COUNT: 13.2 % (ref 12.3–15.4)
GLUCOSE SERPL-MCNC: 101 MG/DL (ref 65–99)
HCT VFR BLD AUTO: 34 % (ref 37.5–51)
HGB BLD-MCNC: 11.8 G/DL (ref 13–17.7)
IMM GRANULOCYTES # BLD AUTO: 0.04 10*3/MM3 (ref 0–0.05)
IMM GRANULOCYTES NFR BLD AUTO: 0.5 % (ref 0–0.5)
LYMPHOCYTES # BLD AUTO: 1.13 10*3/MM3 (ref 0.7–3.1)
LYMPHOCYTES NFR BLD AUTO: 13.8 % (ref 19.6–45.3)
MCH RBC QN AUTO: 30.6 PG (ref 26.6–33)
MCHC RBC AUTO-ENTMCNC: 34.7 G/DL (ref 31.5–35.7)
MCV RBC AUTO: 88.1 FL (ref 79–97)
MONOCYTES # BLD AUTO: 1.01 10*3/MM3 (ref 0.1–0.9)
MONOCYTES NFR BLD AUTO: 12.4 % (ref 5–12)
NEUTROPHILS NFR BLD AUTO: 5.95 10*3/MM3 (ref 1.7–7)
NEUTROPHILS NFR BLD AUTO: 73 % (ref 42.7–76)
NRBC BLD AUTO-RTO: 0 /100 WBC (ref 0–0.2)
PLATELET # BLD AUTO: 290 10*3/MM3 (ref 140–450)
PMV BLD AUTO: 10.3 FL (ref 6–12)
POTASSIUM SERPL-SCNC: 4.5 MMOL/L (ref 3.5–5.2)
QT INTERVAL: 352 MS
QT INTERVAL: 478 MS
QTC INTERVAL: 425 MS
QTC INTERVAL: 555 MS
RBC # BLD AUTO: 3.86 10*6/MM3 (ref 4.14–5.8)
SODIUM SERPL-SCNC: 135 MMOL/L (ref 136–145)
WBC NRBC COR # BLD AUTO: 8.16 10*3/MM3 (ref 3.4–10.8)

## 2025-02-14 PROCEDURE — 63710000001 PREDNISONE PER 1 MG: Performed by: NURSE PRACTITIONER

## 2025-02-14 PROCEDURE — 80048 BASIC METABOLIC PNL TOTAL CA: CPT | Performed by: FAMILY MEDICINE

## 2025-02-14 PROCEDURE — 99232 SBSQ HOSP IP/OBS MODERATE 35: CPT | Performed by: FAMILY MEDICINE

## 2025-02-14 PROCEDURE — 97161 PT EVAL LOW COMPLEX 20 MIN: CPT

## 2025-02-14 PROCEDURE — 85025 COMPLETE CBC W/AUTO DIFF WBC: CPT | Performed by: NURSE PRACTITIONER

## 2025-02-14 PROCEDURE — 97165 OT EVAL LOW COMPLEX 30 MIN: CPT

## 2025-02-14 RX ORDER — AMLODIPINE BESYLATE 5 MG/1
5 TABLET ORAL DAILY
Status: DISCONTINUED | OUTPATIENT
Start: 2025-02-14 | End: 2025-02-15

## 2025-02-14 RX ORDER — AMLODIPINE BESYLATE 5 MG/1
10 TABLET ORAL DAILY
Status: DISCONTINUED | OUTPATIENT
Start: 2025-02-14 | End: 2025-02-14

## 2025-02-14 RX ORDER — SPIRONOLACTONE 25 MG/1
25 TABLET ORAL DAILY
Status: DISCONTINUED | OUTPATIENT
Start: 2025-02-14 | End: 2025-02-17 | Stop reason: HOSPADM

## 2025-02-14 RX ADMIN — CARVEDILOL 3.12 MG: 3.12 TABLET, FILM COATED ORAL at 18:07

## 2025-02-14 RX ADMIN — PREDNISONE 40 MG: 20 TABLET ORAL at 08:58

## 2025-02-14 RX ADMIN — Medication 10 ML: at 20:06

## 2025-02-14 RX ADMIN — SPIRONOLACTONE 25 MG: 25 TABLET ORAL at 15:51

## 2025-02-14 RX ADMIN — EMPAGLIFLOZIN 10 MG: 10 TABLET, FILM COATED ORAL at 08:58

## 2025-02-14 RX ADMIN — ASPIRIN 81 MG: 81 TABLET, COATED ORAL at 20:05

## 2025-02-14 RX ADMIN — SACUBITRIL AND VALSARTAN 2 TABLET: 24; 26 TABLET, FILM COATED ORAL at 20:05

## 2025-02-14 RX ADMIN — MUPIROCIN 1 APPLICATION: 20 OINTMENT TOPICAL at 20:06

## 2025-02-14 RX ADMIN — FENOFIBRATE 145 MG: 145 TABLET, FILM COATED ORAL at 20:05

## 2025-02-14 RX ADMIN — AMLODIPINE BESYLATE 5 MG: 5 TABLET ORAL at 15:51

## 2025-02-14 RX ADMIN — Medication 10 ML: at 08:59

## 2025-02-14 RX ADMIN — CLOPIDOGREL BISULFATE 75 MG: 75 TABLET ORAL at 08:58

## 2025-02-14 RX ADMIN — FUROSEMIDE 40 MG: 40 TABLET ORAL at 08:58

## 2025-02-14 RX ADMIN — SACUBITRIL AND VALSARTAN 2 TABLET: 24; 26 TABLET, FILM COATED ORAL at 08:58

## 2025-02-14 RX ADMIN — MUPIROCIN 1 APPLICATION: 20 OINTMENT TOPICAL at 08:58

## 2025-02-14 RX ADMIN — CARVEDILOL 3.12 MG: 3.12 TABLET, FILM COATED ORAL at 08:58

## 2025-02-14 RX ADMIN — ROSUVASTATIN CALCIUM 10 MG: 5 TABLET, FILM COATED ORAL at 20:05

## 2025-02-14 NOTE — PROGRESS NOTES
[facility] HOSPITALIST    PROGRESS NOTE    Name:  Abebe Crockett   Age:  84 y.o.  Sex:  male  :  1940  MRN:  [id number]   Visit Number:  [id number]  Admission Date:  2025  Date Of Service:  25  Primary Care Physician:  Dre Henry MD     LOS: 4 days :    Chief Complaint:      Shortness of air     Subjective:    Patient was seen examined bedside today.  Patient sitting up comfortably in bed and eating on his meal with no distress.  Patient tolerating p.o. well.  Patient denies any chest pain, shortness of breath or discomfort.  Patient has mild wheezing but otherwise denies any acute complaints today.  He is currently on 2 L nasal cannula.  Wife at bedside.  Discussed recommendations from cardiology, they prefer to wait until seeing Dr. Myles who is his primary cardiologist.  Patient otherwise denies any extremity (femoral/radial) pain.    Hospital Course:    Mr. 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  Patient presented to emergency department due to worsening shortness of air.  Patient denied associated chest pain, nausea, vomiting, or jaw pain.  Worsening dyspnea noted with ambulation.     Upon ED presentation patient hypertensive with blood pressure 169/105, shortly thereafter requiring BiPAP due to severe dyspnea.  Pertinent labs and imaging noted initial troponin 768 with repeat 797 and then 689, creatinine 1.4 which is baseline, proBNP 12,000 with prior 14,000, chest x-ray noting mild CHF with significant improvement.  Hospitalist consulted for further medical management.  Cardiology consulted.  Patient initiated on heparin infusion/continued beta-blocker/aspirin.  Due to patient requiring BiPAP, currently able to lay flat for  cardiac catheterization.  Cardiology suggested increasing Lasix, will schedule cardiac catheterization as patient respiratory status improves.  Entresto initiated.     Patient taken for cardiac catheterization with 80% proximal LAD and mid LAD lesions noted.  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  CTA noted findings concerning for right common femoral and external iliac artery with contained dissections surrounding femoral vascular sheath no evidence of extension of dissection.  Patient transferred to ICU for further observation.  Heparin infusion discontinued.  Gentle fluids given due to CKD and contrast.  Per cardiology will reattempt PCI after femoral artery dissection heals, medical therapy for now.  Continued on aspirin/Plavix/statin.    Review of Systems:     All systems were reviewed and negative except as mentioned in subjective, assessment and plan.    Vital Signs:    Temp:  [97.9 °F (36.6 °C)-98.3 °F (36.8 °C)] 97.9 °F (36.6 °C)  Heart Rate:  [52-81] 61  Resp:  [17-18] 18  BP: ()/(53-95) 166/86    Intake and output:    I/O last 3 completed shifts:  In: 1079 [P.O.:420; I.V.:659]  Out: 1775 [Urine:1775]  I/O this shift:  In: 240 [P.O.:240]  Out: -     Physical Examination:    General Appearance:  Alert and cooperative.  Chronically ill-appearing.  No acute distress.  Generalized weakness noted.   Head:  Atraumatic and normocephalic.   Eyes: Conjunctivae and sclerae normal, no icterus. No pallor.   Throat: No oral lesions, no thrush, oral mucosa moist.   Neck: Supple, trachea midline, no thyromegaly.   Lungs:   Breath sounds heard bilaterally equally.  Prolonged expiration.  Bilateral scattered wheezing.  No crackles.  Unlabored.  On supplemental oxygen.   Heart:  Normal S1 and S2, no murmur, no gallop, no rub. No JVD.   Abdomen:   Normal bowel sounds, no masses, no organomegaly. Soft, nontender, nondistended, no rebound  "tenderness.   Extremities: Supple, no edema, no cyanosis, no clubbing. Right femoral sheath intact. No obvious ecchymosis. Neurovascular intact.    Skin: No bleeding or rash.   Neurologic: Alert and oriented x 3. No facial asymmetry. Moves all four limbs. No tremors.      Laboratory results:    Results from last 7 days   Lab Units 02/14/25  0839 02/13/25  1837 02/13/25  0739 02/12/25  0411 02/10/25  0557 02/09/25  1248   SODIUM mmol/L 135*  --  134* 133*   < > 135*   POTASSIUM mmol/L 4.5 4.9 3.6 3.5   < > 4.3   CHLORIDE mmol/L 103  --  100 95*   < > 100   CO2 mmol/L 21.0*  --  24.2 28.7   < > 26.1   BUN mg/dL 27*  --  27* 27*   < > 28*   CREATININE mg/dL 1.50*  --  1.46* 1.44*   < > 1.47*   CALCIUM mg/dL 8.2*  --  8.2* 8.2*   < > 9.0   BILIRUBIN mg/dL  --   --   --   --   --  0.4   ALK PHOS U/L  --   --   --   --   --  39   ALT (SGPT) U/L  --   --   --   --   --  25   AST (SGOT) U/L  --   --   --   --   --  42*   GLUCOSE mg/dL 101*  --  94 126*   < > 195*    < > = values in this interval not displayed.     Results from last 7 days   Lab Units 02/14/25  0850 02/13/25  0739 02/12/25  0410   WBC 10*3/mm3 8.16 5.60 4.54   HEMOGLOBIN g/dL 11.8* 11.0* 11.5*   HEMATOCRIT % 34.0* 31.1* 32.1*   PLATELETS 10*3/mm3 290 287 266     Results from last 7 days   Lab Units 02/09/25  1248   INR  1.04     Results from last 7 days   Lab Units 02/09/25  1955 02/09/25  1351 02/09/25  1248   HSTROP T ng/L 689* 787* 768*         No results for input(s): \"PHART\", \"HDE2FMZ\", \"PO2ART\", \"LML1KOI\", \"BASEEXCESS\" in the last 8760 hours.   I have reviewed the patient's laboratory results.    Radiology results:    CT Angio Abdominal Aorta Bilateral Iliofem Runoff    Result Date: 2/12/2025  CT ANGIOGRAPHY ABDOMEN, PELVIS, EXTREMITIES BILATERALLY     2/12/2025 10:37 AM  HISTORY: Claudication. Possible dissection; J10.1-Influenza due to other identified influenza virus with other respiratory manifestations; R79.89-Other specified abnormal findings of " blood chemistry; I21.4-Non-ST elevation (NSTEMI) myocardial infarction; I50.21-Acute systolic (congestive) heart failure  PROCEDURE: Pre-contrast, Post-contrast images of the abdomen , pelvis and extremities were performed by computed tomography. Extensive 3 D reconstruction images were performed. A CTA was performed. This study was performed with techniques to keep radiation doses as low as reasonably achievable, (ALARA). Individualized dose reduction techniques using automated exposure control or adjustment of mA and/or kV according to the patient size were employed.  COMPARISON: CT chest 2/4/2025.  FINDINGS: Abdomen and Pelvis: Vascular: Abdominal aorta is normal in caliber. No evidence of aortic dissection or aneurysm. Mild aortic atherosclerotic calcifications.  Celiac axis and its branches are patent.  Superior mesenteric artery and its branches are patent.  Inferior mesenteric artery and its branches are patent.  Single right renal artery is patent.  Single left renal artery is patent.   Non-vascular: Mild cardiomegaly. Small hiatal hernia. Moderate right pleural effusion. Small left pleural effusion. Adjacent lung atelectasis. Cirrhosis. Left hemiliver 9 mm hyperenhancing focus (series 4, image 79), indeterminate. Mild heterogeneous enhancement of the hepatic parenchyma. There is a 20 mm heterogeneously hypodense lesion in the caudate lobe (series 4, image 101), indeterminate, nonemergent MRI liver mass protocol is recommended. Gallbladder is normal in size. No biliary ductal dilatation. Pancreatic parenchymal atrophy. 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 Consider nonemergent MRI pancreatic mass protocol for further evaluation. Spleen is normal in size. Splenic granulomatosis. Small splenule. No adrenal nodule. Bilateral renal parenchymal scarring. Bilateral renal cysts, largest 1 in the left renal inferior pole measuring up to 6.1 cm. 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 Small hiatal hernia. No small bowel dilatation. Right inguinal hernia containing loops of small bowel without evidence of complications. Colonic diverticulosis without evidence of acute diverticulitis. Prostatomegaly. Urinary bladder is distended without obvious abnormality. Trace bilateral pleural effusions. There is prominent enhancement surrounding the left testicle, suggestive of possible varicocele. Consider nonemergent testicular ultrasound for further evaluation.  Lower Extremities:  Right Lower Extremity: Mild calcific and noncalcific atherosclerotic disease of the entire left lower extremity arterial system.  Right Common Iliac Artery: Patent.  Right External Iliac Artery: Patent. There is extension of dissection flap along the medial aspect of the external iliac artery (series 6, image 139-154).  Right Common Femoral Artery: Patent. Right common femoral artery vascular access sheath in place with tip in the right external iliac artery. There is a linear intraluminal filling defect surrounding the vascular sheath (series 4, image 333-352; series 7, image 75), which is extending into the external iliac artery, compatible with intimal flap, concerning for possible contained dissection involving the common femoral and external iliac artery. No arterial occlusion.  Right Deep Femoral Artery: Patent.  Right Superficial Femoral Artery: Patent.  Right Popliteal Artery: Patent.  Right Anterior Tibial Artery: Patent. Moderate multifocal stenosis of the anterior tibial artery.  Right Tibioperoneal Trunk: Patent.  Right Posterior Tibial Artery: Patent.  Right Peroneal Artery: Patent.  Right Non-Vascular: Non-contributory.    Left Lower Extremity: Mild calcific and noncalcific atherosclerotic disease of the entire left lower extremity arterial system.  Left Common Iliac Artery: Patent.  Left External Iliac Artery: Patent.  Left Common Femoral Artery: Patent.   Left Deep Femoral Artery: Patent.  Left Superficial Femoral Artery: Patent.  Left Popliteal Artery: Patent.  Left Anterior Tibial Artery: Patent. Moderate multifocal stenosis of the anterior tibial artery.  Left Tibioperoneal Trunk: Patent.  Left Posterior Tibial Artery: Patent.  Left Peroneal Artery: Patent.  Left Non-Vascular: Non-contributory.       Impression: Findings are concerning for right common femoral and external iliac artery contained dissection surrounding the femoral vascular sheath with extension of the intimal flap into the proximal external iliac artery. There is no evidence of extension of the dissection flap into right common iliac artery or aorta. No evidence of vascular occlusion. Correlate clinically.  Other findings as above.  These findings were discussed with MAKENZIE KERN MD on 2/12/2025 11:31 AM by SENDY Santos, over the phone.   This study was performed with techniques to keep radiation doses as low as reasonably achievable (ALARA). Individualized dose reduction techniques using automated exposure control or adjustment of mA and/or kV according to the patient size were employed.    This report was signed and finalized on 2/12/2025 11:37 AM by SENDY Santos.     I have reviewed the patient's radiology reports.    Medication Review:     I have reviewed the patient's active and prn medications.     Problem List:      NSTEMI (non-ST elevated myocardial infarction)    Acute systolic heart failure      Assessment:    Acute respiratory failure with hypoxia, POA  NSTEMI  Combined heart failure exacerbation with reduced EF, POA  Influenza A  Chronic kidney disease stage III  Type 2 diabetes mellitus  Hyperlipidemia  Hypertension    Plan:    Respiratory failure/influenza A  -Patient titrated down to room air  -Prednisone for mild scattered wheezing and acute bronchitis likely due to underlying influenza A.  -Continue DuoNebs.  -No known lung disease.  No history of tobacco  use.  -CTA chest with prior admission negative for PE.  -No indication for Tamiflu at this time as previously completed.  -PT/OT consulted     Heart failure/NSTEMI  -Continue GDMT as patient able to tolerate.    -Echo noted combined heart failure with EF 30 to 35%.  -Patient appears to be euvolemic.  -Monitor bradycardia-continue Coreg.  -Entresto initiated.  -Heparin infusion discontinued per cardiology commendations  -Continue aspirin and Plavix-Crestor initiated.  -Continue Jardiance.  -Patient overall volume status appears improved from prior hospitalization.  -Cardiology following closely in unit at this time due to right femoral artery dissection.  Per cardiology Dr. Le no further treatment needed for dissection and will heal on its own.  -80% proximal and mid LAD lesions noted unable to stent.  Plan was to reattempt heart cath on Friday.    -I personally discussed with Dr. Le with cardiologist, after Dr. Le discussed with the patient and his wife at bedside regarding need for reattempt heart cath, they prefer to wait until his dissection heals and prefer to discuss with Dr. Myles who is his primary cardiologist prior to attempting heart cath again.  Patient currently with no chest pain.  -Kidney function will need to be monitored closely.  If worsening will consider nephrology consult.  -Currently on nitro drip for blood pressure control, hopefully can come off of nitro drip with restarting his blood pressure medicine.  -Restarted home amlodipine at 10 mg  -Added Aldactone 25 mg     -I have reviewed the copied text and it is accurate as of 2/14/2025     -Will transfer patient to telemetry once off nitro drip     DVT Prophylaxis: SCD/heparin infusion  Code Status: Code  Diet: Cardiac/consistent carb  Discharge Plan: TBD, likely needs 2 to 3 days in the hospital    Giuliana Galvan MD  02/14/25  10:41 EST    Dictated utilizing Dragon dictation.

## 2025-02-14 NOTE — CASE MANAGEMENT/SOCIAL WORK
Continued Stay Note  [facility]     Patient Name: Abebe Crockett  MRN: [id number]  Today's Date: 2/14/2025    Admit Date: 2/9/2025    Plan: Patient to return home with wife when medically stable   Discharge Plan       Row Name 02/14/25 1144       Plan    Plan Patient to return home with wife when medically stable                   Discharge Codes    No documentation.                       Cherry Fragoso RN

## 2025-02-14 NOTE — THERAPY DISCHARGE NOTE
Acute Care - Occupational Therapy Discharge  [facility]    Patient Name: Abebe Crockett  : 1940    MRN: [id number]                              Today's Date: 2025       Admit Date: 2025    Visit Dx:     ICD-10-CM ICD-9-CM   1. Influenza A  J10.1 487.1   2. Elevated troponin  R79.89 790.6   3. NSTEMI (non-ST elevated myocardial infarction)  I21.4 410.70   4. Acute systolic heart failure  I50.21 428.21     Patient Active Problem List   Diagnosis    High blood pressure    Diastolic dysfunction    Syncope and collapse    Hypertensive emergency    Influenza A    Acute respiratory failure with hypoxia    Acute pulmonary edema    Acute systolic heart failure    NSTEMI (non-ST elevated myocardial infarction)     Past Medical History:   Diagnosis Date    Acute systolic heart failure 2025    Diabetes mellitus     Hypertension     Sleep apnea     CPAP COMPLIANT     Past Surgical History:   Procedure Laterality Date    CARDIAC CATHETERIZATION N/A 2025    Procedure: Left Heart Cath;  Surgeon: Benjamin Le MD;  Location: [facility] CATH INVASIVE LOCATION;  Service: Cardiovascular;  Laterality: N/A;    COLONOSCOPY        General Information       Row Name 25 1310          OT Time and Intention    Document Type discharge evaluation/summary  -SP     Mode of Treatment occupational therapy  -SP       Row Name 25 1310          General Information    Patient Profile Reviewed yes  -SP     Prior Level of Function independent:;all household mobility;community mobility;ADL's  -SP     Barriers to Rehab hearing deficit  -SP       Row Name 25 1310          Living Environment    People in Home spouse  -SP       Row Name 25 1310          Home Main Entrance    Number of Stairs, Main Entrance six;other (see comments)  ramped entry available as well  -SP       Row Name 25 1310          Stairs Within Home, Primary    Number of Stairs, Within Home, Primary none  -SP       Row Name 25 1310           Cognition    Orientation Status (Cognition) oriented x 4  -SP       Row Name 02/14/25 1310          Safety Issues/Impairments Affecting Functional Mobility    Safety Issues Affecting Function (Mobility) awareness of need for assistance  -SP     Impairments Affecting Function (Mobility) balance  -SP               User Key  (r) = Recorded By, (t) = Taken By, (c) = Cosigned By      Initials Name Provider Type    SP Ester Brice OT Occupational Therapist                   Mobility/ADL's       Row Name 02/14/25 1311          Bed Mobility    Bed Mobility bed mobility (all) activities  -SP     All Activities, Purchase (Bed Mobility) modified independence  -SP       Row Name 02/14/25 1311          Transfers    Transfers sit-stand transfer  -SP       Row Name 02/14/25 1311          Sit-Stand Transfer    Sit-Stand Purchase (Transfers) standby assist  -SP     Assistive Device (Sit-Stand Transfers) other (see comments)  gait belt  -SP       Row Name 02/14/25 1311          Functional Mobility    Functional Mobility- Ind. Level standby assist  -SP     Functional Mobility- Device --  gait belt  -SP     Functional Mobility-Distance (Feet) 222  -SP     Patient was able to Ambulate yes  -SP       Row Name 02/14/25 1311          Activities of Daily Living    BADL Assessment/Intervention bathing;upper body dressing;lower body dressing;grooming;feeding;toileting  -SP       Row Name 02/14/25 1311          Bathing Assessment/Intervention    [place] Level (Bathing) bathing skills;standby assist  -SP       Row Name 02/14/25 1311          Upper Body Dressing Assessment/Training    [place] Level (Upper Body Dressing) upper body dressing skills;set up  -SP       Row Name 02/14/25 1311          Lower Body Dressing Assessment/Training    [place] Level (Lower Body Dressing) lower body dressing skills;set up  -SP       Row Name 02/14/25 1311          Grooming Assessment/Training    [place] Level (Grooming)  grooming skills;independent  -SP     Position (Grooming) edge of bed sitting  -SP       Row Name 02/14/25 1311          Self-Feeding Assessment/Training    [place] Level (Feeding) feeding skills;independent  -SP       Row Name 02/14/25 1311          Toileting Assessment/Training    [place] Level (Toileting) toileting skills;standby assist  -SP               User Key  (r) = Recorded By, (t) = Taken By, (c) = Cosigned By      Initials Name Provider Type    SP Ester Brice OT Occupational Therapist                   Obj/Interventions       Row Name 02/14/25 1312          Range of Motion Comprehensive    General Range of Motion bilateral upper extremity ROM WFL  -SP       Row Name 02/14/25 1312          Strength Comprehensive (MMT)    General Manual Muscle Testing (MMT) Assessment no strength deficits identified  -SP     Comment, General Manual Muscle Testing (MMT) Assessment B UE Grossly 4+/5  -SP       Row Name 02/14/25 1312          Balance    Balance Assessment sitting static balance;sitting dynamic balance;standing static balance;standing dynamic balance  -SP     Static Sitting Balance independent  -SP     Dynamic Sitting Balance supervision  -SP     Position, Sitting Balance unsupported;sitting edge of bed  -SP     Static Standing Balance standby assist  -SP     Dynamic Standing Balance standby assist  -SP               User Key  (r) = Recorded By, (t) = Taken By, (c) = Cosigned By      Initials Name Provider Type    Ester Young OT Occupational Therapist                   Goals/Plan    No documentation.                  Clinical Impression       Row Name 02/14/25 1313          Pain Assessment    Pretreatment Pain Rating 0/10 - no pain  -SP     Posttreatment Pain Rating 0/10 - no pain  -SP       Row Name 02/14/25 1313          Plan of Care Review    Plan of Care Reviewed With patient;spouse  -SP     Progress no change  -SP     Outcome Evaluation Pt agreeable to the OT romero. He is joined by his  "spouse and A&O x4. He denies pain and reports that he lives with his wife in a single story home with 6 DAVIN (ramp available). He is independent at baseline for all ADLs, IADLs, househould and community mobility. He denies using any DME or falls but states that he does \"wobble\" some when he walks. He moved to eob with mod I, donned socks with MENDOZA, donned a clean gown with MENDOZA, transferred to standing with SBA and ambulated 222ft with SBA without an AD. He was assisted back to the chair and he was reclined for comfort with all needs in reach. Pt appears to be at his functional baseline and he and his wife also agree he is at his baseline. He does not require skilled OT services while hospitalized and he was encouraged to continue to ambulate with RN staff and complete ADLs daily. He would benefit from considering outpatient physical therapy and using a rolling walker for higher lvl balance deficits. Recommend he dc home with assist from his wife. OT signing off.  -SP       Row Name 02/14/25 1313          Therapy Assessment/Plan (OT)    Criteria for Skilled Therapeutic Interventions Met (OT) no;no problems identified which require skilled intervention  -SP       Row Name 02/14/25 1313          Therapy Plan Review/Discharge Plan (OT)    Anticipated Discharge Disposition (OT) home with assist;other (see comments)  outpatient PT for higher lvl balance training  -SP       Row Name 02/14/25 1313          Vital Signs    O2 Delivery Pre Treatment room air  -SP     O2 Delivery Intra Treatment room air  -SP     O2 Delivery Post Treatment room air  -SP     Pre Patient Position Supine  -SP     Intra Patient Position Standing  -SP     Post Patient Position Sitting  -SP       Row Name 02/14/25 1313          Positioning and Restraints    Pre-Treatment Position in bed  -SP     Post Treatment Position chair  -SP     In Chair reclined;call light within reach;encouraged to call for assist;with family/caregiver;patient within staff view  " -SP               User Key  (r) = Recorded By, (t) = Taken By, (c) = Cosigned By      Initials Name Provider Type    Ester Young OT Occupational Therapist                   Outcome Measures       Row Name 02/14/25 1317          How much help from another is currently needed...    Putting on and taking off regular lower body clothing? 3  -SP     Bathing (including washing, rinsing, and drying) 3  -SP     Toileting (which includes using toilet bed pan or urinal) 3  -SP     Putting on and taking off regular upper body clothing 4  -SP     Taking care of personal grooming (such as brushing teeth) 4  -SP     Eating meals 4  -SP     AM-PAC 6 Clicks Score (OT) 21  -SP       Row Name 02/14/25 1259          How much help from another person do you currently need...    Turning from your back to your side while in flat bed without using bedrails? 4  -MK     Moving from lying on back to sitting on the side of a flat bed without bedrails? 4  -MK     Moving to and from a bed to a chair (including a wheelchair)? 4  -MK     Standing up from a chair using your arms (e.g., wheelchair, bedside chair)? 3  -MK     Climbing 3-5 steps with a railing? 3  -MK     To walk in hospital room? 3  -MK     AM-PAC 6 Clicks Score (PT) 21  -MK     Highest Level of Mobility Goal 6 --> Walk 10 steps or more  -MK       Row Name 02/14/25 1317 02/14/25 1259       Functional Assessment    Outcome Measure Options AM-PAC 6 Clicks Daily Activity (OT)  -SP AM-PAC 6 Clicks Basic Mobility (PT)  -MK              User Key  (r) = Recorded By, (t) = Taken By, (c) = Cosigned By      Initials Name Provider Type    Ester Young OT Occupational Therapist    David Romero PT Physical Therapist                  Occupational Therapy Education       Title: PT OT SLP Therapies (Done)       Topic: Occupational Therapy (Done)       Point: ADL training (Done)       Description:   Instruct learner(s) on proper safety adaptation and remediation techniques during self  "care or transfers.   Instruct in proper use of assistive devices.                  Learning Progress Summary            Patient Acceptance, E, VU by SP at 2/14/2025 1318    Comment: Pt and his spouse were educated on the purpose of the OT eval and POC.   Significant Other Acceptance, E, VU by SP at 2/14/2025 1318    Comment: Pt and his spouse were educated on the purpose of the OT eval and POC.                                      User Key       Initials Effective Dates Name Provider Type Discipline    SP 09/08/22 -  Ester Brice, OT Occupational Therapist OT                  OT Recommendation and Plan     Plan of Care Review  Plan of Care Reviewed With: patient, spouse  Progress: no change  Outcome Evaluation: Pt agreeable to the OT eval. He is joined by his spouse and A&O x4. He denies pain and reports that he lives with his wife in a single story home with 6 DAVIN (ramp available). He is independent at baseline for all ADLs, IADLs, househould and community mobility. He denies using any DME or falls but states that he does \"wobble\" some when he walks. He moved to eob with mod I, donned socks with MENDOZA, donned a clean gown with MENDOZA, transferred to standing with SBA and ambulated 222ft with SBA without an AD. He was assisted back to the chair and he was reclined for comfort with all needs in reach. Pt appears to be at his functional baseline and he and his wife also agree he is at his baseline. He does not require skilled OT services while hospitalized and he was encouraged to continue to ambulate with RN staff and complete ADLs daily. He would benefit from considering outpatient physical therapy and using a rolling walker for higher lvl balance deficits. Recommend he dc home with assist from his wife. OT signing off.  Plan of Care Reviewed With: patient, spouse  Outcome Evaluation: Pt agreeable to the OT eval. He is joined by his spouse and A&O x4. He denies pain and reports that he lives with his wife in a single story " "home with 6 DAVIN (ramp available). He is independent at baseline for all ADLs, IADLs, househould and community mobility. He denies using any DME or falls but states that he does \"wobble\" some when he walks. He moved to eob with mod I, donned socks with MENDOZA, donned a clean gown with MENDOZA, transferred to standing with SBA and ambulated 222ft with SBA without an AD. He was assisted back to the chair and he was reclined for comfort with all needs in reach. Pt appears to be at his functional baseline and he and his wife also agree he is at his baseline. He does not require skilled OT services while hospitalized and he was encouraged to continue to ambulate with RN staff and complete ADLs daily. He would benefit from considering outpatient physical therapy and using a rolling walker for higher lvl balance deficits. Recommend he dc home with assist from his wife. OT signing off.     Time Calculation:   Evaluation Complexity (OT)  Review Occupational Profile/Medical/Therapy History Complexity: expanded/moderate complexity  Assessment, Occupational Performance/Identification of Deficit Complexity: 1-3 performance deficits  Clinical Decision Making Complexity (OT): problem focused assessment/low complexity  Overall Complexity of Evaluation (OT): low complexity     Time Calculation- OT       Row Name 02/14/25 1319             Time Calculation- OT    OT Start Time 1103  -SP      OT Received On 02/14/25  -SP         Untimed Charges    OT Eval/Re-eval Minutes 54  -SP         Total Minutes    Untimed Charges Total Minutes 54  -SP       Total Minutes 54  -SP                User Key  (r) = Recorded By, (t) = Taken By, (c) = Cosigned By      Initials Name Provider Type    SP Ester Brice OT Occupational Therapist                  Therapy Charges for Today       Code Description Service Date Service Provider Modifiers Qty    [id number]  OT EVAL LOW COMPLEXITY 4 2/14/2025 Ester Brice OT GO 1               OT Discharge " Summary  Anticipated Discharge Disposition (OT): home with assist, other (see comments) (outpatient PT for higher lvl balance training)    Ester Brice, OT  2/14/2025

## 2025-02-14 NOTE — PLAN OF CARE
"Goal Outcome Evaluation:  Plan of Care Reviewed With: patient, spouse        Progress: no change  Outcome Evaluation: Pt agreeable to the OT romero. He is joined by his spouse and was A&O x4. He denies pain and reports that he lives with his wife in a single story home with 6 DAVIN (ramp available). He is independent at baseline for all ADLs, IADLs, househould and community mobility. He denies using any DME or falls but states that he does \"wobble\" some when he walks. He moved to eob with mod I, donned socks with MENDOZA, donned a clean gown with MENDOZA, transferred to standing with SBA and ambulated 222ft with SBA without an AD. VSS throughout. He was assisted back to the chair and he was reclined for comfort with all needs in reach. Pt appears to be at his functional baseline and he and his wife also agree he is at his baseline. He does not require skilled OT services while hospitalized and he was encouraged to continue to ambulate with RN staff and complete ADLs daily. He would benefit from considering outpatient physical therapy and using a rolling walker for higher lvl balance deficits. Recommend he dc home with assist from his wife. OT signing off.    Anticipated Discharge Disposition (OT): home with assist, other (see comments) (outpatient PT for higher lvl balance training)                        "

## 2025-02-14 NOTE — THERAPY DISCHARGE NOTE
Patient Name: Abebe Crockett  : 1940    MRN: [id number]                              Today's Date: 2025       Admit Date: 2025    Visit Dx:     ICD-10-CM ICD-9-CM   1. Influenza A  J10.1 487.1   2. Elevated troponin  R79.89 790.6   3. NSTEMI (non-ST elevated myocardial infarction)  I21.4 410.70   4. Acute systolic heart failure  I50.21 428.21     Patient Active Problem List   Diagnosis    High blood pressure    Diastolic dysfunction    Syncope and collapse    Hypertensive emergency    Influenza A    Acute respiratory failure with hypoxia    Acute pulmonary edema    Acute systolic heart failure    NSTEMI (non-ST elevated myocardial infarction)     Past Medical History:   Diagnosis Date    Acute systolic heart failure 2025    Diabetes mellitus     Hypertension     Sleep apnea     CPAP COMPLIANT     Past Surgical History:   Procedure Laterality Date    CARDIAC CATHETERIZATION N/A 2025    Procedure: Left Heart Cath;  Surgeon: Benjamin Le MD;  Location: [facility] CATH INVASIVE LOCATION;  Service: Cardiovascular;  Laterality: N/A;    COLONOSCOPY        General Information       Row Name 25 1254          Physical Therapy Time and Intention    Document Type discharge evaluation/summary  -     Mode of Treatment physical therapy  -       Row Name 25 1254          General Information    Patient Profile Reviewed yes  -     Prior Level of Function independent:;ADL's;all household mobility;community mobility  -     Barriers to Rehab hearing deficit  -       Row Name 25 1254          Living Environment    People in Home spouse  -       Row Name 25 1254          Home Main Entrance    Number of Stairs, Main Entrance six;other (see comments)  also has ramp to enter  -       Row Name 25 1254          Stairs Within Home, Primary    Number of Stairs, Within Home, Primary none  -       Row Name 25 1254          Cognition    Orientation Status (Cognition)  oriented x 4  -       Row Name 02/14/25 1254          Safety Issues/Impairments Affecting Functional Mobility    Safety Issues Affecting Function (Mobility) awareness of need for assistance  -               User Key  (r) = Recorded By, (t) = Taken By, (c) = Cosigned By      Initials Name Provider Type    David Romero, PT Physical Therapist                   Mobility       Row Name 02/14/25 1255          Bed Mobility    Bed Mobility bed mobility (all) activities  -     All Activities, [place] (Bed Mobility) modified independence  -       Row Name 02/14/25 1255          Sit-Stand Transfer    Sit-Stand [place] (Transfers) standby assist  -     Assistive Device (Sit-Stand Transfers) other (see comments)  gait belt  -       Row Name 02/14/25 1255          Gait/Stairs (Locomotion)    [place] Level (Gait) standby assist  -     Assistive Device (Gait) other (see comments)  gait belt  -     Patient was able to Ambulate yes  -     Distance in Feet (Gait) 222  -               User Key  (r) = Recorded By, (t) = Taken By, (c) = Cosigned By      Initials Name Provider Type    David Romero PT Physical Therapist                   Obj/Interventions       Row Name 02/14/25 1256          Range of Motion Comprehensive    General Range of Motion no range of motion deficits identified  -       Row Name 02/14/25 1256          Strength Comprehensive (MMT)    General Manual Muscle Testing (MMT) Assessment no strength deficits identified  -       Row Name 02/14/25 1256          Balance    Balance Assessment sitting static balance;sitting dynamic balance;standing static balance;standing dynamic balance  -     Static Sitting Balance modified independence  -     Dynamic Sitting Balance modified independence  -     Position, Sitting Balance unsupported;sitting edge of bed  -     Static Standing Balance standby assist  -     Dynamic Standing Balance standby assist  -     Balance  Interventions sitting;standing;sit to stand  -               User Key  (r) = Recorded By, (t) = Taken By, (c) = Cosigned By      Initials Name Provider Type    David Romero, PT Physical Therapist                   Goals/Plan    No documentation.                  Clinical Impression       Row Name 02/14/25 1256          Pain    Pretreatment Pain Rating 0/10 - no pain  -     Posttreatment Pain Rating 0/10 - no pain  -       Row Name 02/14/25 1256          Plan of Care Review    Plan of Care Reviewed With patient;spouse  -     Progress improving  -     Outcome Evaluation Pt participated well in PT evaluation this date. Pt alert and oriented x4, previously IND with all ADLs and mobility, using no AD for mobility. Pt completed bed mobility mod I, then completed STS SBA with no AD. Pt ambulated 222 ft using no AD SBA with gait belt donned. Pt returned to room and reported that he did not have any skilled PT needs at this time, PT agreed. Pt educated on frequent mobility, expressing understanding. PT to sign off at this time.  -       Row Name 02/14/25 1256          Therapy Assessment/Plan (PT)    Patient/Family Therapy Goals Statement (PT) to go home  -     Criteria for Skilled Interventions Met (PT) no;no problems identified which require skilled intervention  -     Therapy Frequency (PT) evaluation only  -       Row Name 02/14/25 1256          Vital Signs    O2 Delivery Pre Treatment room air  -     O2 Delivery Intra Treatment room air  -     O2 Delivery Post Treatment room air  -     Pre Patient Position Supine  -     Intra Patient Position Standing  -     Post Patient Position Sitting  -       Row Name 02/14/25 1256          Positioning and Restraints    Pre-Treatment Position in bed  -     Post Treatment Position chair  -     In Chair notified nsg;reclined;with family/caregiver;call light within reach;encouraged to call for assist  -               User Key  (r) = Recorded By,  (t) = Taken By, (c) = Cosigned By      Initials Name Provider Type    David Romero PT Physical Therapist                   Outcome Measures       Row Name 02/14/25 1259          How much help from another person do you currently need...    Turning from your back to your side while in flat bed without using bedrails? 4  -MK     Moving from lying on back to sitting on the side of a flat bed without bedrails? 4  -MK     Moving to and from a bed to a chair (including a wheelchair)? 4  -MK     Standing up from a chair using your arms (e.g., wheelchair, bedside chair)? 3  -MK     Climbing 3-5 steps with a railing? 3  -MK     To walk in hospital room? 3  -     AM-PAC 6 Clicks Score (PT) 21  -     Highest Level of Mobility Goal 6 --> Walk 10 steps or more  -       Row Name 02/14/25 1253          Functional Assessment    Outcome Measure Options AM-PAC 6 Clicks Basic Mobility (PT)  -               User Key  (r) = Recorded By, (t) = Taken By, (c) = Cosigned By      Initials Name Provider Type    David Romero PT Physical Therapist                  Physical Therapy Education       Title: PT OT SLP Therapies (Resolved)       Topic: Physical Therapy (Resolved)       Point: Mobility training (Resolved)       Learning Progress Summary            Patient Acceptance, E,D, DU,VU by  at 2/14/2025 1300                      Point: Home exercise program (Resolved)       Learner Progress:  Not documented in this visit.              Point: Body mechanics (Resolved)       Learner Progress:  Not documented in this visit.              Point: Precautions (Resolved)       Learner Progress:  Not documented in this visit.                              User Key       Initials Effective Dates Name Provider Type Discipline     06/13/23 -  David Montilla, IZZY Physical Therapist PT                  PT Recommendation and Plan     Progress: improving  Outcome Evaluation: Pt participated well in PT evaluation this date. Pt alert and  oriented x4, previously IND with all ADLs and mobility, using no AD for mobility. Pt completed bed mobility mod I, then completed STS SBA with no AD. Pt ambulated 222 ft using no AD SBA with gait belt donned. Pt returned to room and reported that he did not have any skilled PT needs at this time, PT agreed. Pt educated on frequent mobility, expressing understanding. PT to sign off at this time.     Time Calculation:   PT Evaluation Complexity  History, PT Evaluation Complexity: 1-2 personal factors and/or comorbidities  Examination of Body Systems (PT Eval Complexity): 1-2 elements  Clinical Presentation (PT Evaluation Complexity): stable  Clinical Decision Making (PT Evaluation Complexity): low complexity  Overall Complexity (PT Evaluation Complexity): low complexity     PT Charges       Row Name 02/14/25 1103             Time Calculation    Start Time 1103  -MK      PT Received On 02/14/25  -MK         Untimed Charges    PT Eval/Re-eval Minutes 55  -MK         Total Minutes    Untimed Charges Total Minutes 55  -MK       Total Minutes 55  -MK                User Key  (r) = Recorded By, (t) = Taken By, (c) = Cosigned By      Initials Name Provider Type    David Romero, PT Physical Therapist                  Therapy Charges for Today       Code Description Service Date Service Provider Modifiers Qty    [id number]  PT EVAL LOW COMPLEXITY 4 2/14/2025 David Montilla, PT GP 1            PT G-Codes  Outcome Measure Options: AM-PAC 6 Clicks Basic Mobility (PT)  AM-PAC 6 Clicks Score (PT): 21         David Montilla PT  2/14/2025

## 2025-02-14 NOTE — PLAN OF CARE
Goal Outcome Evaluation:              Outcome Evaluation: Patient alert and oriented, sinus arrhythmia with PVCs & PACs, O2 > 92% on room air, nitroglycerin drip infusing this AM, patient's wife at bedside, and patient sleeping in-between care during the shift.

## 2025-02-14 NOTE — PLAN OF CARE
Goal Outcome Evaluation:  Plan of Care Reviewed With: patient, spouse        Progress: improving  Outcome Evaluation: Pt participated well in PT evaluation this date. Pt alert and oriented x4, previously IND with all ADLs and mobility, using no AD for mobility. Pt completed bed mobility mod I, then completed STS SBA with no AD. Pt ambulated 222 ft using no AD SBA with gait belt donned. Pt returned to room and reported that he did not have any skilled PT needs at this time, PT agreed. Pt educated on frequent mobility, expressing understanding. PT to sign off at this time.

## 2025-02-14 NOTE — PLAN OF CARE
Goal Outcome Evaluation:   Pt. Is a pleasant male who is alert and oriented. He has worked with PT today and has sat in recliner this afternoon. He is still requiring a small dose of nitro to keep sys. Bp <140. Wife at bedside. Will continue to follow.

## 2025-02-15 LAB
ANION GAP SERPL CALCULATED.3IONS-SCNC: 8.2 MMOL/L (ref 5–15)
BUN SERPL-MCNC: 24 MG/DL (ref 8–23)
BUN/CREAT SERPL: 18.3 (ref 7–25)
CALCIUM SPEC-SCNC: 8.2 MG/DL (ref 8.6–10.5)
CHLORIDE SERPL-SCNC: 104 MMOL/L (ref 98–107)
CO2 SERPL-SCNC: 23.8 MMOL/L (ref 22–29)
CREAT SERPL-MCNC: 1.31 MG/DL (ref 0.76–1.27)
DEPRECATED RDW RBC AUTO: 40.9 FL (ref 37–54)
EGFRCR SERPLBLD CKD-EPI 2021: 54.3 ML/MIN/1.73
ERYTHROCYTE [DISTWIDTH] IN BLOOD BY AUTOMATED COUNT: 12.9 % (ref 12.3–15.4)
GLUCOSE SERPL-MCNC: 116 MG/DL (ref 65–99)
HCT VFR BLD AUTO: 31.6 % (ref 37.5–51)
HGB BLD-MCNC: 10.9 G/DL (ref 13–17.7)
MCH RBC QN AUTO: 29.9 PG (ref 26.6–33)
MCHC RBC AUTO-ENTMCNC: 34.5 G/DL (ref 31.5–35.7)
MCV RBC AUTO: 86.8 FL (ref 79–97)
PLATELET # BLD AUTO: 287 10*3/MM3 (ref 140–450)
PMV BLD AUTO: 10.3 FL (ref 6–12)
POTASSIUM SERPL-SCNC: 4.1 MMOL/L (ref 3.5–5.2)
RBC # BLD AUTO: 3.64 10*6/MM3 (ref 4.14–5.8)
SODIUM SERPL-SCNC: 136 MMOL/L (ref 136–145)
WBC NRBC COR # BLD AUTO: 7.04 10*3/MM3 (ref 3.4–10.8)

## 2025-02-15 PROCEDURE — 63710000001 PREDNISONE PER 1 MG: Performed by: NURSE PRACTITIONER

## 2025-02-15 PROCEDURE — 99232 SBSQ HOSP IP/OBS MODERATE 35: CPT | Performed by: FAMILY MEDICINE

## 2025-02-15 PROCEDURE — 85027 COMPLETE CBC AUTOMATED: CPT | Performed by: FAMILY MEDICINE

## 2025-02-15 PROCEDURE — 25010000002 HYDRALAZINE PER 20 MG: Performed by: FAMILY MEDICINE

## 2025-02-15 PROCEDURE — 80048 BASIC METABOLIC PNL TOTAL CA: CPT | Performed by: FAMILY MEDICINE

## 2025-02-15 RX ORDER — HYDRALAZINE HYDROCHLORIDE 25 MG/1
25 TABLET, FILM COATED ORAL EVERY 12 HOURS SCHEDULED
Status: DISCONTINUED | OUTPATIENT
Start: 2025-02-15 | End: 2025-02-16

## 2025-02-15 RX ORDER — HYDRALAZINE HYDROCHLORIDE 20 MG/ML
10 INJECTION INTRAMUSCULAR; INTRAVENOUS EVERY 4 HOURS PRN
Status: DISCONTINUED | OUTPATIENT
Start: 2025-02-15 | End: 2025-02-17 | Stop reason: HOSPADM

## 2025-02-15 RX ADMIN — HYDRALAZINE HYDROCHLORIDE 25 MG: 25 TABLET ORAL at 11:43

## 2025-02-15 RX ADMIN — AMLODIPINE BESYLATE 5 MG: 5 TABLET ORAL at 05:19

## 2025-02-15 RX ADMIN — MUPIROCIN 1 APPLICATION: 20 OINTMENT TOPICAL at 09:06

## 2025-02-15 RX ADMIN — ROSUVASTATIN CALCIUM 10 MG: 5 TABLET, FILM COATED ORAL at 21:16

## 2025-02-15 RX ADMIN — SACUBITRIL AND VALSARTAN 2 TABLET: 24; 26 TABLET, FILM COATED ORAL at 21:17

## 2025-02-15 RX ADMIN — CARVEDILOL 3.12 MG: 3.12 TABLET, FILM COATED ORAL at 05:21

## 2025-02-15 RX ADMIN — CARVEDILOL 3.12 MG: 3.12 TABLET, FILM COATED ORAL at 17:03

## 2025-02-15 RX ADMIN — PREDNISONE 40 MG: 20 TABLET ORAL at 09:08

## 2025-02-15 RX ADMIN — FUROSEMIDE 40 MG: 40 TABLET ORAL at 05:20

## 2025-02-15 RX ADMIN — HYDRALAZINE HYDROCHLORIDE 10 MG: 20 INJECTION INTRAMUSCULAR; INTRAVENOUS at 09:44

## 2025-02-15 RX ADMIN — FENOFIBRATE 145 MG: 145 TABLET, FILM COATED ORAL at 21:17

## 2025-02-15 RX ADMIN — SACUBITRIL AND VALSARTAN 2 TABLET: 24; 26 TABLET, FILM COATED ORAL at 09:06

## 2025-02-15 RX ADMIN — SPIRONOLACTONE 25 MG: 25 TABLET ORAL at 05:20

## 2025-02-15 RX ADMIN — CLOPIDOGREL BISULFATE 75 MG: 75 TABLET ORAL at 09:08

## 2025-02-15 RX ADMIN — EMPAGLIFLOZIN 10 MG: 10 TABLET, FILM COATED ORAL at 09:06

## 2025-02-15 RX ADMIN — MUPIROCIN 1 APPLICATION: 20 OINTMENT TOPICAL at 21:16

## 2025-02-15 RX ADMIN — HYDRALAZINE HYDROCHLORIDE 25 MG: 25 TABLET ORAL at 21:23

## 2025-02-15 RX ADMIN — ASPIRIN 81 MG: 81 TABLET, COATED ORAL at 21:17

## 2025-02-15 RX ADMIN — Medication 10 ML: at 21:17

## 2025-02-15 NOTE — PROGRESS NOTES
[facility] HOSPITALIST    PROGRESS NOTE    Name:  Abebe Crockett   Age:  84 y.o.  Sex:  male  :  1940  MRN:  [id number]   Visit Number:  [id number]  Admission Date:  2025  Date Of Service:  02/15/25  Primary Care Physician:  Dre Henry MD     LOS: 5 days :    Chief Complaint:      Shortness of air     Subjective:    Patient was seen examined bedside today.  Wife at bedside.  Patient sitting up comfortably in bed and eating on his meal with no distress.  Denies any acute pain or discomfort.  Reports doing well today.  Denies any shortness of breath or cough.  He is on room air.  Vitals are stable, blood pressure more controlled today.  He is off nitro drip.    Hospital Course:    Mr. 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  Patient presented to emergency department due to worsening shortness of air.  Patient denied associated chest pain, nausea, vomiting, or jaw pain.  Worsening dyspnea noted with ambulation.     Upon ED presentation patient hypertensive with blood pressure 169/105, shortly thereafter requiring BiPAP due to severe dyspnea.  Pertinent labs and imaging noted initial troponin 768 with repeat 797 and then 689, creatinine 1.4 which is baseline, proBNP 12,000 with prior 14,000, chest x-ray noting mild CHF with significant improvement.  Hospitalist consulted for further medical management.  Cardiology consulted.  Patient initiated on heparin infusion/continued beta-blocker/aspirin.  Due to patient requiring BiPAP, currently able to lay flat for cardiac catheterization.  Cardiology suggested increasing Lasix, will schedule cardiac catheterization as patient respiratory status improves.  Entresto initiated.     Patient taken for  cardiac catheterization with 80% proximal LAD and mid LAD lesions noted.  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  CTA noted findings concerning for right common femoral and external iliac artery with contained dissections surrounding femoral vascular sheath no evidence of extension of dissection.  Patient transferred to ICU for further observation.  Heparin infusion discontinued.  Gentle fluids given due to CKD and contrast.  Per cardiology will reattempt PCI after femoral artery dissection heals, medical therapy for now.  Continued on aspirin/Plavix/statin.    Review of Systems:     All systems were reviewed and negative except as mentioned in subjective, assessment and plan.    Vital Signs:    Temp:  [97.3 °F (36.3 °C)-98.4 °F (36.9 °C)] 97.5 °F (36.4 °C)  Heart Rate:  [53-73] (P) 59  Resp:  [10-18] 16  BP: (102-165)/(58-99) (P) 174/81    Intake and output:    I/O last 3 completed shifts:  In: 720 [P.O.:720]  Out: 1500 [Urine:1500]  No intake/output data recorded.    Physical Examination:    General Appearance:  Alert and cooperative.  Chronically ill-appearing.  No acute distress.  Generalized weakness noted.   Head:  Atraumatic and normocephalic.   Eyes: Conjunctivae and sclerae normal, no icterus. No pallor.   Throat: No oral lesions, no thrush, oral mucosa moist.   Neck: Supple, trachea midline, no thyromegaly.   Lungs:   Breath sounds heard bilaterally equally.  Prolonged expiration.  No wheezing.  No crackles.  Unlabored.  On supplemental oxygen.   Heart:  Normal S1 and S2, no murmur, no gallop, no rub. No JVD.   Abdomen:   Normal bowel sounds, no masses, no organomegaly. Soft, nontender, nondistended, no rebound tenderness.   Extremities: Supple, no edema, no cyanosis, no clubbing. Right femoral sheath intact. No obvious ecchymosis. Neurovascular intact.    Skin: No bleeding or rash.   Neurologic: Alert and oriented x 3. No  "facial asymmetry. Moves all four limbs. No tremors.      Laboratory results:    Results from last 7 days   Lab Units 02/15/25  0448 02/14/25  0839 02/13/25  1837 02/13/25  0739 02/10/25  0557 02/09/25  1248   SODIUM mmol/L 136 135*  --  134*   < > 135*   POTASSIUM mmol/L 4.1 4.5 4.9 3.6   < > 4.3   CHLORIDE mmol/L 104 103  --  100   < > 100   CO2 mmol/L 23.8 21.0*  --  24.2   < > 26.1   BUN mg/dL 24* 27*  --  27*   < > 28*   CREATININE mg/dL 1.31* 1.50*  --  1.46*   < > 1.47*   CALCIUM mg/dL 8.2* 8.2*  --  8.2*   < > 9.0   BILIRUBIN mg/dL  --   --   --   --   --  0.4   ALK PHOS U/L  --   --   --   --   --  39   ALT (SGPT) U/L  --   --   --   --   --  25   AST (SGOT) U/L  --   --   --   --   --  42*   GLUCOSE mg/dL 116* 101*  --  94   < > 195*    < > = values in this interval not displayed.     Results from last 7 days   Lab Units 02/15/25  0448 02/14/25  0850 02/13/25  0739   WBC 10*3/mm3 7.04 8.16 5.60   HEMOGLOBIN g/dL 10.9* 11.8* 11.0*   HEMATOCRIT % 31.6* 34.0* 31.1*   PLATELETS 10*3/mm3 287 290 287     Results from last 7 days   Lab Units 02/09/25  1248   INR  1.04     Results from last 7 days   Lab Units 02/09/25  1955 02/09/25  1351 02/09/25  1248   HSTROP T ng/L 689* 787* 768*         No results for input(s): \"PHART\", \"DPI4DQW\", \"PO2ART\", \"QTW7GNN\", \"BASEEXCESS\" in the last 8760 hours.   I have reviewed the patient's laboratory results.    Radiology results:    No radiology results from the last 24 hrs  I have reviewed the patient's radiology reports.    Medication Review:     I have reviewed the patient's active and prn medications.     Problem List:      NSTEMI (non-ST elevated myocardial infarction)    Acute systolic heart failure      Assessment:    Acute respiratory failure with hypoxia, POA  NSTEMI  Combined heart failure exacerbation with reduced EF, POA  Influenza A  Chronic kidney disease stage III  Type 2 diabetes mellitus  Hyperlipidemia  Hypertension    Plan:    Respiratory failure/influenza " A  -Patient titrated down to room air  -Prednisone for mild scattered wheezing and acute bronchitis likely due to underlying influenza A.  -Continue DuoNebs.  -No known lung disease.  No history of tobacco use.  -CTA chest with prior admission negative for PE.  -No indication for Tamiflu at this time as previously completed.  -PT/OT consulted     Heart failure/NSTEMI  -Continue GDMT as patient able to tolerate.    -Echo noted combined heart failure with EF 30 to 35%.  -Patient appears to be euvolemic.  -Monitor bradycardia-continue Coreg.  -Entresto initiated.  -Heparin infusion discontinued per cardiology commendations  -Continue aspirin and Plavix-Crestor initiated.  -Continue Jardiance.  -Patient overall volume status appears improved from prior hospitalization.  -Cardiology following closely in unit at this time due to right femoral artery dissection.  Per cardiology Dr. Le no further treatment needed for dissection and will heal on its own.  -80% proximal and mid LAD lesions noted unable to stent.  Plan was to reattempt heart cath on Friday.    -I personally discussed with Dr. Le with cardiologist, after Dr. Le discussed with the patient and his wife at bedside regarding need for reattempt heart cath, they prefer to wait until his dissection heals and prefer to discuss with Dr. Myles who is his primary cardiologist prior to attempting heart cath again.  Patient currently with no chest pain.  -Kidney function will need to be monitored closely.  If worsening will consider nephrology consult.  -He is off nitro drip  -Cardiology recommending discontinuing amlodipine, started hydralazine 25 mg twice daily and hydralazine as needed.  -Added Aldactone 25 mg     -I have reviewed the copied text and it is accurate as of 2/15/2025     -Will transfer patient to telemetry     DVT Prophylaxis: SCD/heparin infusion  Code Status: Code  Diet: Cardiac/consistent carb  Discharge Plan: TBD, likely needs 1 to 2 days in the  [facility]    Giuliana Galvan MD  02/15/25  10:24 EST    Dictated utilizing Dragon dictation.

## 2025-02-15 NOTE — PLAN OF CARE
Goal Outcome Evaluation:  Plan of Care Reviewed With: patient        Progress: improving  Outcome Evaluation: Patient is a/o x 4, VSS on RA. SR with PVC's on monitor. No pain reported. Wife stayed bedside, updated as shift progressed. No complaints at this time.

## 2025-02-15 NOTE — PLAN OF CARE
Goal Outcome Evaluation:  Plan of Care Reviewed With: patient, spouse        Progress: improving     Maintained off nitro drip through the night. Did require antihypertensive medications ahead of schedule d/t SBP above target range, cardiologist messaged to alert of hospitalist orders to give meds early.

## 2025-02-16 LAB
ANION GAP SERPL CALCULATED.3IONS-SCNC: 8.6 MMOL/L (ref 5–15)
BUN SERPL-MCNC: 26 MG/DL (ref 8–23)
BUN/CREAT SERPL: 18.8 (ref 7–25)
CALCIUM SPEC-SCNC: 8.3 MG/DL (ref 8.6–10.5)
CHLORIDE SERPL-SCNC: 100 MMOL/L (ref 98–107)
CO2 SERPL-SCNC: 23.4 MMOL/L (ref 22–29)
CREAT SERPL-MCNC: 1.38 MG/DL (ref 0.76–1.27)
DEPRECATED RDW RBC AUTO: 41.7 FL (ref 37–54)
EGFRCR SERPLBLD CKD-EPI 2021: 51.1 ML/MIN/1.73
ERYTHROCYTE [DISTWIDTH] IN BLOOD BY AUTOMATED COUNT: 13.1 % (ref 12.3–15.4)
GLUCOSE SERPL-MCNC: 135 MG/DL (ref 65–99)
HCT VFR BLD AUTO: 32 % (ref 37.5–51)
HGB BLD-MCNC: 11.2 G/DL (ref 13–17.7)
MCH RBC QN AUTO: 30.5 PG (ref 26.6–33)
MCHC RBC AUTO-ENTMCNC: 35 G/DL (ref 31.5–35.7)
MCV RBC AUTO: 87.2 FL (ref 79–97)
PLATELET # BLD AUTO: 280 10*3/MM3 (ref 140–450)
PMV BLD AUTO: 10.4 FL (ref 6–12)
POTASSIUM SERPL-SCNC: 4.4 MMOL/L (ref 3.5–5.2)
RBC # BLD AUTO: 3.67 10*6/MM3 (ref 4.14–5.8)
SODIUM SERPL-SCNC: 132 MMOL/L (ref 136–145)
WBC NRBC COR # BLD AUTO: 8.1 10*3/MM3 (ref 3.4–10.8)

## 2025-02-16 PROCEDURE — 80048 BASIC METABOLIC PNL TOTAL CA: CPT | Performed by: FAMILY MEDICINE

## 2025-02-16 PROCEDURE — 85027 COMPLETE CBC AUTOMATED: CPT | Performed by: FAMILY MEDICINE

## 2025-02-16 PROCEDURE — 99232 SBSQ HOSP IP/OBS MODERATE 35: CPT | Performed by: FAMILY MEDICINE

## 2025-02-16 RX ORDER — HYDRALAZINE HYDROCHLORIDE 25 MG/1
25 TABLET, FILM COATED ORAL EVERY 8 HOURS SCHEDULED
Status: DISCONTINUED | OUTPATIENT
Start: 2025-02-16 | End: 2025-02-17 | Stop reason: HOSPADM

## 2025-02-16 RX ADMIN — Medication 10 ML: at 08:42

## 2025-02-16 RX ADMIN — ROSUVASTATIN CALCIUM 10 MG: 5 TABLET, FILM COATED ORAL at 21:22

## 2025-02-16 RX ADMIN — FENOFIBRATE 145 MG: 145 TABLET, FILM COATED ORAL at 21:23

## 2025-02-16 RX ADMIN — HYDRALAZINE HYDROCHLORIDE 25 MG: 25 TABLET ORAL at 08:41

## 2025-02-16 RX ADMIN — SPIRONOLACTONE 25 MG: 25 TABLET ORAL at 08:41

## 2025-02-16 RX ADMIN — ASPIRIN 81 MG: 81 TABLET, COATED ORAL at 21:22

## 2025-02-16 RX ADMIN — SACUBITRIL AND VALSARTAN 2 TABLET: 24; 26 TABLET, FILM COATED ORAL at 21:22

## 2025-02-16 RX ADMIN — MUPIROCIN 1 APPLICATION: 20 OINTMENT TOPICAL at 08:42

## 2025-02-16 RX ADMIN — CARVEDILOL 3.12 MG: 3.12 TABLET, FILM COATED ORAL at 08:42

## 2025-02-16 RX ADMIN — FUROSEMIDE 40 MG: 40 TABLET ORAL at 08:42

## 2025-02-16 RX ADMIN — MUPIROCIN 1 APPLICATION: 20 OINTMENT TOPICAL at 21:24

## 2025-02-16 RX ADMIN — EMPAGLIFLOZIN 10 MG: 10 TABLET, FILM COATED ORAL at 08:42

## 2025-02-16 RX ADMIN — Medication 10 ML: at 21:23

## 2025-02-16 RX ADMIN — CARVEDILOL 3.12 MG: 3.12 TABLET, FILM COATED ORAL at 17:16

## 2025-02-16 RX ADMIN — CLOPIDOGREL BISULFATE 75 MG: 75 TABLET ORAL at 08:42

## 2025-02-16 RX ADMIN — SACUBITRIL AND VALSARTAN 2 TABLET: 24; 26 TABLET, FILM COATED ORAL at 08:42

## 2025-02-16 NOTE — PROGRESS NOTES
[facility] HOSPITALIST    PROGRESS NOTE    Name:  Abebe Crockett   Age:  84 y.o.  Sex:  male  :  1940  MRN:  [id number]   Visit Number:  [id number]  Admission Date:  2025  Date Of Service:  25  Primary Care Physician:  Dre Henry MD     LOS: 6 days :    Chief Complaint:      Shortness of air     Subjective:    Patient was seen examined bedside today.  Wife at bedside.  Patient sitting up comfortably in bed and eating on his meal with no distress.  Denies any acute pain or discomfort.  Reports doing well today.  Denies any shortness of breath or cough.  He is on room air.  Vitals are stable, blood pressure more controlled today.     Hospital Course:    Mr. 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  Patient presented to emergency department due to worsening shortness of air.  Patient denied associated chest pain, nausea, vomiting, or jaw pain.  Worsening dyspnea noted with ambulation.     Upon ED presentation patient hypertensive with blood pressure 169/105, shortly thereafter requiring BiPAP due to severe dyspnea.  Pertinent labs and imaging noted initial troponin 768 with repeat 797 and then 689, creatinine 1.4 which is baseline, proBNP 12,000 with prior 14,000, chest x-ray noting mild CHF with significant improvement.  Hospitalist consulted for further medical management.  Cardiology consulted.  Patient initiated on heparin infusion/continued beta-blocker/aspirin.  Due to patient requiring BiPAP, currently able to lay flat for cardiac catheterization.  Cardiology suggested increasing Lasix, will schedule cardiac catheterization as patient respiratory status improves.  Entresto initiated.     Patient taken for cardiac catheterization  with 80% proximal LAD and mid LAD lesions noted.  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  CTA noted findings concerning for right common femoral and external iliac artery with contained dissections surrounding femoral vascular sheath no evidence of extension of dissection.  Patient transferred to ICU for further observation.  Heparin infusion discontinued.  Gentle fluids given due to CKD and contrast.  Per cardiology will reattempt PCI after femoral artery dissection heals, medical therapy for now.  Continued on aspirin/Plavix/statin.    Review of Systems:     All systems were reviewed and negative except as mentioned in subjective, assessment and plan.    Vital Signs:    Temp:  [96.8 °F (36 °C)-98.3 °F (36.8 °C)] 97.8 °F (36.6 °C)  Heart Rate:  [57-78] (P) 67  BP: (104-179)/(56-87) (P) 151/81    Intake and output:    I/O last 3 completed shifts:  In: 240 [P.O.:240]  Out: 2075 [Urine:2075]  No intake/output data recorded.    Physical Examination:    General Appearance:  Alert and cooperative.  Chronically ill-appearing.  No acute distress.     Head:  Atraumatic and normocephalic.   Eyes: Conjunctivae and sclerae normal, no icterus. No pallor.   Throat: No oral lesions, no thrush, oral mucosa moist.   Neck: Supple, trachea midline, no thyromegaly.   Lungs:   Breath sounds heard bilaterally equally. No wheezing.  No crackles.  Unlabored.  On supplemental oxygen.   Heart:  Normal S1 and S2, no murmur, no gallop, no rub. No JVD.   Abdomen:   Normal bowel sounds, no masses, no organomegaly. Soft, nontender, nondistended, no rebound tenderness.   Extremities: Supple, no edema, no cyanosis, no clubbing. Right femoral sheath intact. No obvious ecchymosis. Neurovascular intact.    Skin: No bleeding or rash.   Neurologic: Alert and oriented x 3. No facial asymmetry. Moves all four limbs. No tremors.      Laboratory results:    Results from last  "7 days   Lab Units 02/16/25  0409 02/15/25  0448 02/14/25  0839 02/10/25  0557 02/09/25  1248   SODIUM mmol/L 132* 136 135*   < > 135*   POTASSIUM mmol/L 4.4 4.1 4.5   < > 4.3   CHLORIDE mmol/L 100 104 103   < > 100   CO2 mmol/L 23.4 23.8 21.0*   < > 26.1   BUN mg/dL 26* 24* 27*   < > 28*   CREATININE mg/dL 1.38* 1.31* 1.50*   < > 1.47*   CALCIUM mg/dL 8.3* 8.2* 8.2*   < > 9.0   BILIRUBIN mg/dL  --   --   --   --  0.4   ALK PHOS U/L  --   --   --   --  39   ALT (SGPT) U/L  --   --   --   --  25   AST (SGOT) U/L  --   --   --   --  42*   GLUCOSE mg/dL 135* 116* 101*   < > 195*    < > = values in this interval not displayed.     Results from last 7 days   Lab Units 02/16/25  0409 02/15/25  0448 02/14/25  0850   WBC 10*3/mm3 8.10 7.04 8.16   HEMOGLOBIN g/dL 11.2* 10.9* 11.8*   HEMATOCRIT % 32.0* 31.6* 34.0*   PLATELETS 10*3/mm3 280 287 290     Results from last 7 days   Lab Units 02/09/25  1248   INR  1.04     Results from last 7 days   Lab Units 02/09/25  1955 02/09/25  1351 02/09/25  1248   HSTROP T ng/L 689* 787* 768*         No results for input(s): \"PHART\", \"IPC6HTI\", \"PO2ART\", \"QUG6VNY\", \"BASEEXCESS\" in the last 8760 hours.   I have reviewed the patient's laboratory results.    Radiology results:    No radiology results from the last 24 hrs  I have reviewed the patient's radiology reports.    Medication Review:     I have reviewed the patient's active and prn medications.     Problem List:      NSTEMI (non-ST elevated myocardial infarction)    Acute systolic heart failure      Assessment:    Acute respiratory failure with hypoxia, POA  NSTEMI  Combined heart failure exacerbation with reduced EF, POA  Influenza A  Chronic kidney disease stage III  Type 2 diabetes mellitus  Hyperlipidemia  Hypertension    Plan:    Respiratory failure/influenza A  -Patient titrated down to room air  -Prednisone for mild scattered wheezing and acute bronchitis likely due to underlying influenza A.  -Continue DuoNebs.  -No known lung " disease.  No history of tobacco use.  -CTA chest with prior admission negative for PE.  -No indication for Tamiflu at this time as previously completed.  -PT/OT consulted     Heart failure/NSTEMI  -Continue GDMT as patient able to tolerate.    -Echo noted combined heart failure with EF 30 to 35%.  -Patient appears to be euvolemic.  -Monitor bradycardia-continue Coreg.  -Entresto initiated.  -Heparin infusion discontinued per cardiology commendations  -Continue aspirin, Plavix, Crestor, Jardiance.  -Patient overall volume status appears improved from prior hospitalization.  -Cardiology following closely in unit at this time due to right femoral artery dissection.  Per cardiology Dr. Le no further treatment needed for dissection and will heal on its own.  -80% proximal and mid LAD lesions noted unable to stent.  Plan was to reattempt heart cath on Friday.    -I personally discussed with Dr. Le with cardiologist, after Dr. Le discussed with the patient and his wife at bedside regarding need for reattempt heart cath, they prefer to wait until his dissection heals and prefer to discuss with Dr. Myles who is his primary cardiologist prior to attempting heart cath again.  Patient currently with no chest pain.  -Kidney function will need to be monitored closely.  If worsening will consider nephrology consult.  -He is off nitro drip  -Cardiology recommending discontinuing amlodipine, started hydralazine 25 mg 3 times daily and hydralazine as needed.  -Added Aldactone 25 mg  -N.p.o. after midnight  -Dr. Myles to see the patient tomorrow     -I have reviewed the copied text and it is accurate as of 2/16/2025      DVT Prophylaxis: SCD/heparin infusion  Code Status: Code  Diet: Cardiac/consistent carb  Discharge Plan: TBD, likely needs 1 to 2 days in the hospital    Giuliana Galvan MD  02/16/25  09:04 EST    Dictated utilizing Dragon dictation.

## 2025-02-16 NOTE — PLAN OF CARE
Goal Outcome Evaluation:      Transfer to [facility] from ICU. NPO after midnight        Problem: Adult Inpatient Plan of Care  Goal: Plan of Care Review  Outcome: Progressing  Goal: Patient-Specific Goal (Individualized)  Outcome: Progressing  Goal: Absence of Hospital-Acquired Illness or Injury  Outcome: Progressing  Intervention: Identify and Manage Fall Risk  Recent Flowsheet Documentation  Taken 2/16/2025 1800 by Cassandra Clemente LPN  Safety Promotion/Fall Prevention:   safety round/check completed   room organization consistent  Taken 2/16/2025 1600 by Cassandra Clemente LPN  Safety Promotion/Fall Prevention:   safety round/check completed   room organization consistent  Taken 2/16/2025 1400 by Cassandra Clemente LPN  Safety Promotion/Fall Prevention:   safety round/check completed   room organization consistent  Taken 2/16/2025 1200 by Cassandra Clemente LPN  Safety Promotion/Fall Prevention:   safety round/check completed   room organization consistent  Intervention: Prevent Skin Injury  Recent Flowsheet Documentation  Taken 2/16/2025 1600 by Cassandra Clemente LPN  Body Position: sitting up in bed  Goal: Optimal Comfort and Wellbeing  Outcome: Progressing  Goal: Readiness for Transition of Care  Outcome: Progressing     Problem: Pain Acute  Goal: Optimal Pain Control and Function  Outcome: Progressing  Intervention: Prevent or Manage Pain  Recent Flowsheet Documentation  Taken 2/16/2025 1600 by Cassandra Clemente LPN  Medication Review/Management: medications reviewed  Taken 2/16/2025 1200 by Cassandra Clemente LPN  Medication Review/Management: medications reviewed     Problem: Fall Injury Risk  Goal: Absence of Fall and Fall-Related Injury  Outcome: Progressing  Intervention: Identify and Manage Contributors  Recent Flowsheet Documentation  Taken 2/16/2025 1600 by Cassandra Clemente LPN  Medication Review/Management: medications reviewed  Taken 2/16/2025 1200 by Cassandra Clemenet LPN  Medication  Review/Management: medications reviewed  Intervention: Promote Injury-Free Environment  Recent Flowsheet Documentation  Taken 2/16/2025 1800 by Cassandra Clemente LPN  Safety Promotion/Fall Prevention:   safety round/check completed   room organization consistent  Taken 2/16/2025 1600 by Cassandra Clemente LPN  Safety Promotion/Fall Prevention:   safety round/check completed   room organization consistent  Taken 2/16/2025 1400 by Cassandra Clemente LPN  Safety Promotion/Fall Prevention:   safety round/check completed   room organization consistent  Taken 2/16/2025 1200 by Cassandra Clemente LPN  Safety Promotion/Fall Prevention:   safety round/check completed   room organization consistent     Problem: Heart Failure  Goal: Optimal Coping  Outcome: Progressing  Goal: Optimal Cardiac Output and Blood Flow  Outcome: Progressing  Goal: Stable Heart Rate and Rhythm  Outcome: Progressing  Goal: Fluid and Electrolyte Balance  Outcome: Progressing  Goal: Optimal Functional Ability  Outcome: Progressing  Intervention: Optimize Functional Ability  Recent Flowsheet Documentation  Taken 2/16/2025 1800 by Cassandra Clemente LPN  Activity Management: activity encouraged  Taken 2/16/2025 1200 by Cassandra Clemente LPN  Activity Management: activity encouraged  Goal: Improved Oral Intake  Outcome: Progressing  Goal: Effective Oxygenation and Ventilation  Outcome: Progressing  Intervention: Promote Airway Secretion Clearance  Recent Flowsheet Documentation  Taken 2/16/2025 1800 by Cassandra Clemente LPN  Activity Management: activity encouraged  Taken 2/16/2025 1200 by Cassandra Clemente LPN  Activity Management: activity encouraged  Intervention: Optimize Oxygenation and Ventilation  Recent Flowsheet Documentation  Taken 2/16/2025 1800 by Cassandra Clemente LPN  Head of Bed (HOB) Positioning: HOB elevated  Goal: Effective Breathing Pattern During Sleep  Outcome: Progressing  Intervention: Monitor and Manage Obstructive Sleep  Apnea  Recent Flowsheet Documentation  Taken 2/16/2025 1600 by Cassandra Clemente LPN  Medication Review/Management: medications reviewed  Taken 2/16/2025 1200 by Cassandra Clemente LPN  Medication Review/Management: medications reviewed     Problem: Skin Injury Risk Increased  Goal: Skin Health and Integrity  Outcome: Progressing  Intervention: Optimize Skin Protection  Recent Flowsheet Documentation  Taken 2/16/2025 1800 by Cassandra Clemente LPN  Activity Management: activity encouraged  Head of Bed (HOB) Positioning: HOB elevated  Taken 2/16/2025 1200 by Cassandra Clemente LPN  Activity Management: activity encouraged     Problem: Noninvasive Ventilation Acute  Goal: Effective Unassisted Ventilation and Oxygenation  Outcome: Progressing

## 2025-02-17 ENCOUNTER — READMISSION MANAGEMENT (OUTPATIENT)
Dept: CALL CENTER | Facility: HOSPITAL | Age: 85
End: 2025-02-17
Payer: MEDICARE

## 2025-02-17 VITALS
WEIGHT: 168.43 LBS | HEART RATE: 77 BPM | DIASTOLIC BLOOD PRESSURE: 62 MMHG | SYSTOLIC BLOOD PRESSURE: 125 MMHG | HEIGHT: 67 IN | RESPIRATION RATE: 18 BRPM | BODY MASS INDEX: 26.44 KG/M2 | TEMPERATURE: 98.1 F | OXYGEN SATURATION: 97 %

## 2025-02-17 DIAGNOSIS — I25.10 CORONARY ARTERY DISEASE INVOLVING NATIVE CORONARY ARTERY OF NATIVE HEART, UNSPECIFIED WHETHER ANGINA PRESENT: Primary | ICD-10-CM

## 2025-02-17 PROBLEM — I50.23 ACUTE ON CHRONIC HFREF (HEART FAILURE WITH REDUCED EJECTION FRACTION): Status: ACTIVE | Noted: 2025-02-17

## 2025-02-17 LAB
ANION GAP SERPL CALCULATED.3IONS-SCNC: 8.6 MMOL/L (ref 5–15)
BUN SERPL-MCNC: 27 MG/DL (ref 8–23)
BUN/CREAT SERPL: 17.8 (ref 7–25)
CALCIUM SPEC-SCNC: 8.6 MG/DL (ref 8.6–10.5)
CHLORIDE SERPL-SCNC: 101 MMOL/L (ref 98–107)
CO2 SERPL-SCNC: 22.4 MMOL/L (ref 22–29)
CREAT SERPL-MCNC: 1.52 MG/DL (ref 0.76–1.27)
DEPRECATED RDW RBC AUTO: 41.1 FL (ref 37–54)
EGFRCR SERPLBLD CKD-EPI 2021: 45.5 ML/MIN/1.73
ERYTHROCYTE [DISTWIDTH] IN BLOOD BY AUTOMATED COUNT: 13.2 % (ref 12.3–15.4)
GLUCOSE SERPL-MCNC: 128 MG/DL (ref 65–99)
HCT VFR BLD AUTO: 36.1 % (ref 37.5–51)
HGB BLD-MCNC: 12.5 G/DL (ref 13–17.7)
MCH RBC QN AUTO: 29.9 PG (ref 26.6–33)
MCHC RBC AUTO-ENTMCNC: 34.6 G/DL (ref 31.5–35.7)
MCV RBC AUTO: 86.4 FL (ref 79–97)
PLATELET # BLD AUTO: 343 10*3/MM3 (ref 140–450)
PMV BLD AUTO: 10.2 FL (ref 6–12)
POTASSIUM SERPL-SCNC: 4.3 MMOL/L (ref 3.5–5.2)
RBC # BLD AUTO: 4.18 10*6/MM3 (ref 4.14–5.8)
SODIUM SERPL-SCNC: 132 MMOL/L (ref 136–145)
WBC NRBC COR # BLD AUTO: 7.71 10*3/MM3 (ref 3.4–10.8)

## 2025-02-17 PROCEDURE — 85027 COMPLETE CBC AUTOMATED: CPT | Performed by: FAMILY MEDICINE

## 2025-02-17 PROCEDURE — 99233 SBSQ HOSP IP/OBS HIGH 50: CPT | Performed by: INTERNAL MEDICINE

## 2025-02-17 PROCEDURE — 99238 HOSP IP/OBS DSCHRG MGMT 30/<: CPT | Performed by: FAMILY MEDICINE

## 2025-02-17 PROCEDURE — 80048 BASIC METABOLIC PNL TOTAL CA: CPT | Performed by: FAMILY MEDICINE

## 2025-02-17 RX ORDER — ROSUVASTATIN CALCIUM 10 MG/1
10 TABLET, COATED ORAL NIGHTLY
Qty: 90 TABLET | Refills: 0 | Status: SHIPPED | OUTPATIENT
Start: 2025-02-17 | End: 2025-05-18

## 2025-02-17 RX ORDER — SPIRONOLACTONE 25 MG/1
25 TABLET ORAL DAILY
Qty: 30 TABLET | Refills: 0 | Status: SHIPPED | OUTPATIENT
Start: 2025-02-18 | End: 2025-03-20

## 2025-02-17 RX ORDER — SACUBITRIL AND VALSARTAN 49; 51 MG/1; MG/1
1 TABLET, FILM COATED ORAL EVERY 12 HOURS SCHEDULED
Qty: 60 TABLET | Refills: 0 | Status: SHIPPED | OUTPATIENT
Start: 2025-02-17 | End: 2025-03-19

## 2025-02-17 RX ORDER — NITROGLYCERIN 0.4 MG/1
0.4 TABLET SUBLINGUAL
Qty: 25 TABLET | Refills: 0 | Status: SHIPPED | OUTPATIENT
Start: 2025-02-17 | End: 2025-02-27

## 2025-02-17 RX ORDER — FUROSEMIDE 40 MG/1
40 TABLET ORAL DAILY
Qty: 7 TABLET | Refills: 0 | Status: SHIPPED | OUTPATIENT
Start: 2025-02-18 | End: 2025-02-25

## 2025-02-17 RX ORDER — HYDRALAZINE HYDROCHLORIDE 25 MG/1
25 TABLET, FILM COATED ORAL EVERY 8 HOURS SCHEDULED
Qty: 90 TABLET | Refills: 0 | Status: SHIPPED | OUTPATIENT
Start: 2025-02-17 | End: 2025-03-19

## 2025-02-17 RX ORDER — CLOPIDOGREL BISULFATE 75 MG/1
75 TABLET ORAL DAILY
Qty: 30 TABLET | Refills: 0 | Status: SHIPPED | OUTPATIENT
Start: 2025-02-18 | End: 2025-03-20

## 2025-02-17 RX ORDER — CARVEDILOL 3.12 MG/1
3.12 TABLET ORAL 2 TIMES DAILY WITH MEALS
Qty: 60 TABLET | Refills: 0 | Status: SHIPPED | OUTPATIENT
Start: 2025-02-17 | End: 2025-03-19

## 2025-02-17 RX ADMIN — SACUBITRIL AND VALSARTAN 2 TABLET: 24; 26 TABLET, FILM COATED ORAL at 09:56

## 2025-02-17 RX ADMIN — CLOPIDOGREL BISULFATE 75 MG: 75 TABLET ORAL at 09:56

## 2025-02-17 RX ADMIN — FUROSEMIDE 40 MG: 40 TABLET ORAL at 09:55

## 2025-02-17 RX ADMIN — MUPIROCIN 1 APPLICATION: 20 OINTMENT TOPICAL at 09:56

## 2025-02-17 RX ADMIN — CARVEDILOL 3.12 MG: 3.12 TABLET, FILM COATED ORAL at 09:56

## 2025-02-17 RX ADMIN — SPIRONOLACTONE 25 MG: 25 TABLET ORAL at 09:56

## 2025-02-17 RX ADMIN — EMPAGLIFLOZIN 10 MG: 10 TABLET, FILM COATED ORAL at 09:56

## 2025-02-17 RX ADMIN — Medication 10 ML: at 09:59

## 2025-02-17 NOTE — PLAN OF CARE
Problem: Adult Inpatient Plan of Care  Goal: Plan of Care Review  Outcome: Progressing  Goal: Patient-Specific Goal (Individualized)  Outcome: Progressing  Goal: Absence of Hospital-Acquired Illness or Injury  Outcome: Progressing  Intervention: Identify and Manage Fall Risk  Recent Flowsheet Documentation  Taken 2/17/2025 0400 by Rosita Vasquez RN  Safety Promotion/Fall Prevention: safety round/check completed  Taken 2/17/2025 0200 by Rosita Vasquez RN  Safety Promotion/Fall Prevention: safety round/check completed  Taken 2/17/2025 0000 by Rosita Vasquez RN  Safety Promotion/Fall Prevention: safety round/check completed  Taken 2/16/2025 2200 by Rosita Vasquez RN  Safety Promotion/Fall Prevention: safety round/check completed  Taken 2/16/2025 2000 by Rosita Vasquez RN  Safety Promotion/Fall Prevention: safety round/check completed  Intervention: Prevent Skin Injury  Recent Flowsheet Documentation  Taken 2/17/2025 0400 by Rosita Vasquez RN  Body Position:   side-lying   right  Taken 2/17/2025 0200 by Rosita Vasquez RN  Body Position:   right   side-lying  Taken 2/17/2025 0000 by Rosita Vasquez RN  Body Position:   left   side-lying  Taken 2/16/2025 2200 by Rosita Vasquez RN  Body Position:   side-lying   right  Taken 2/16/2025 2000 by Rosita Vasquez RN  Body Position: sitting up in bed  Goal: Optimal Comfort and Wellbeing  Outcome: Progressing  Goal: Readiness for Transition of Care  Outcome: Progressing     Problem: Pain Acute  Goal: Optimal Pain Control and Function  Outcome: Progressing     Problem: Fall Injury Risk  Goal: Absence of Fall and Fall-Related Injury  Outcome: Progressing  Intervention: Promote Injury-Free Environment  Recent Flowsheet Documentation  Taken 2/17/2025 0400 by Rosita Vasquez RN  Safety Promotion/Fall Prevention: safety round/check completed  Taken 2/17/2025 0200 by Rosita Vasquez RN  Safety Promotion/Fall Prevention: safety round/check completed  Taken 2/17/2025 0000 by  Pedro, Rosita, RN  Safety Promotion/Fall Prevention: safety round/check completed  Taken 2/16/2025 2200 by Rosita Vasquez RN  Safety Promotion/Fall Prevention: safety round/check completed  Taken 2/16/2025 2000 by Rosita Vasquez RN  Safety Promotion/Fall Prevention: safety round/check completed     Problem: Heart Failure  Goal: Optimal Coping  Outcome: Progressing  Goal: Optimal Cardiac Output and Blood Flow  Outcome: Progressing  Goal: Stable Heart Rate and Rhythm  Outcome: Progressing  Goal: Fluid and Electrolyte Balance  Outcome: Progressing  Goal: Optimal Functional Ability  Outcome: Progressing  Intervention: Optimize Functional Ability  Recent Flowsheet Documentation  Taken 2/17/2025 0400 by Rosita Vasquez RN  Activity Management: activity encouraged  Taken 2/17/2025 0200 by Rosita Vasquez RN  Activity Management: activity encouraged  Taken 2/17/2025 0000 by Rosita Vasquez RN  Activity Management: activity encouraged  Taken 2/16/2025 2200 by Rosita Vasquez RN  Activity Management: activity encouraged  Taken 2/16/2025 2000 by Rosita Vasquez RN  Activity Management: activity encouraged  Goal: Improved Oral Intake  Outcome: Progressing  Goal: Effective Oxygenation and Ventilation  Outcome: Progressing  Intervention: Promote Airway Secretion Clearance  Recent Flowsheet Documentation  Taken 2/17/2025 0400 by Rosita Vasquez RN  Activity Management: activity encouraged  Taken 2/17/2025 0200 by Rosita Vasquez RN  Activity Management: activity encouraged  Taken 2/17/2025 0000 by Rosita Vasquez RN  Activity Management: activity encouraged  Taken 2/16/2025 2200 by Rosita Vasquez RN  Activity Management: activity encouraged  Taken 2/16/2025 2000 by Rosita Vasquez RN  Activity Management: activity encouraged  Intervention: Optimize Oxygenation and Ventilation  Recent Flowsheet Documentation  Taken 2/17/2025 0400 by Rosita Vasquez RN  Head of Bed (HOB) Positioning: HOB elevated  Taken 2/17/2025 0200 by  Rosita Vasquez RN  Head of Bed (HOB) Positioning: HOB elevated  Taken 2/17/2025 0000 by Rosita Vasquez RN  Head of Bed (HOB) Positioning: HOB lowered  Taken 2/16/2025 2200 by Rsoita Vasquez RN  Head of Bed (HOB) Positioning: HOB elevated  Taken 2/16/2025 2000 by Rosita Vasquez RN  Head of Bed (HOB) Positioning: HOB elevated  Goal: Effective Breathing Pattern During Sleep  Outcome: Progressing     Problem: Skin Injury Risk Increased  Goal: Skin Health and Integrity  Outcome: Progressing  Intervention: Optimize Skin Protection  Recent Flowsheet Documentation  Taken 2/17/2025 0400 by Rosita Vasquez RN  Activity Management: activity encouraged  Head of Bed (HOB) Positioning: HOB elevated  Taken 2/17/2025 0200 by Rosita Vasquez RN  Activity Management: activity encouraged  Head of Bed (HOB) Positioning: HOB elevated  Taken 2/17/2025 0000 by Rosita Vasquez RN  Activity Management: activity encouraged  Head of Bed (HOB) Positioning: HOB lowered  Taken 2/16/2025 2200 by Rosita Vasquez RN  Activity Management: activity encouraged  Head of Bed (HOB) Positioning: HOB elevated  Taken 2/16/2025 2000 by Rosita Vasquez RN  Activity Management: activity encouraged  Head of Bed (HOB) Positioning: HOB elevated     Problem: Noninvasive Ventilation Acute  Goal: Effective Unassisted Ventilation and Oxygenation  Outcome: Progressing   Goal Outcome Evaluation:

## 2025-02-17 NOTE — OUTREACH NOTE
Prep Survey      Flowsheet Row Responses   [facility] facility patient discharged from? [facility]   Is LACE score < 7 ? No   Eligibility Readm Mgmt   Discharge diagnosis NSTEMI, heart cath- PCI attempt aborted d/t dissection   Does the patient have one of the following disease processes/diagnoses(primary or secondary)? Acute MI (STEMI,NSTEMI)   Does the patient have Home health ordered? No   Is there a DME ordered? No   Prep survey completed? Yes            Madeline Joseph Registered Nurse

## 2025-02-17 NOTE — DISCHARGE INSTRUCTIONS
-Continue Entresto, Jardiance, Lasix and spironolactone as ordered.  -We discontinued lisinopril and amlodipine, please disregard those medicines  -We decreased your carvedilol to 3.125 mg 2 times a day, please take as prescribed  -Please take Plavix in addition to your aspirin as prescribed.  -Take hydralazine as prescribed  -Please take Crestor as prescribed  -Monitor blood pressure at home.  -Follow-up with cardiology in the office in 1 week as scheduled  -Follow-up with PCP in 2 to 3 days for recheck and continued care.

## 2025-02-17 NOTE — CASE MANAGEMENT/SOCIAL WORK
Case Management Discharge Note                Selected Continued Care - Admitted Since 2/9/2025       Destination    No services have been selected for the patient.                Durable Medical Equipment    No services have been selected for the patient.                Dialysis/Infusion    No services have been selected for the patient.                Home Medical Care    No services have been selected for the patient.                Therapy    No services have been selected for the patient.                Community Resources    No services have been selected for the patient.                Community & [facility] [place]    No services have been selected for the patient.                    Transportation Services  Private: Car    Final Discharge Disposition Code: 01 - home or self-care

## 2025-02-17 NOTE — DISCHARGE SUMMARY
[facility]      Name:  Abebe Crockett   Age:  84 y.o.  Sex:  male  :  1940  MRN:  [id number]   Visit Number:  [id number]    Admission Date:  2025  Date of Discharge:  2025  Primary Care Physician:  Dre Henry MD    Important issues to note:    -Started on Plavix, Entresto, Lasix, spironolactone, Crestor and hydralazine.  -Discontinue lisinopril and amlodipine.  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     Presenting Problem:    Chief Complaint   Patient presents with    Shortness of Breath      Consults:     Consulting Physician(s)         Provider   Role Specialty     Adeel Myles MD      Consulting Physician Cardiology     Benjamin Le MD      Consulting Physician Interventional Cardiology          Procedures Performed:    Procedure(s):  Left Heart Cath    History of presenting illness/Hospital Course:    Mr. 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  Patient presented to emergency department due to worsening shortness of air.  Patient denied associated  chest pain, nausea, vomiting, or jaw pain.  Worsening dyspnea noted with ambulation.     Upon ED presentation patient hypertensive with blood pressure 169/105, shortly thereafter requiring BiPAP due to severe dyspnea.  Pertinent labs and imaging noted initial troponin 768 with repeat 797 and then 689, creatinine 1.4 which is baseline, proBNP 12,000 with prior 14,000, chest x-ray noting mild CHF with significant improvement.  Hospitalist consulted for further medical management.  Cardiology consulted.  Patient initiated on heparin infusion/continued beta-blocker/aspirin.  Due to patient requiring BiPAP, currently able to lay flat for cardiac catheterization.  Cardiology suggested increasing Lasix, will schedule cardiac catheterization as patient respiratory status improves.  Entresto initiated.     Patient taken for cardiac catheterization with 80% proximal LAD and mid LAD lesions noted.  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  CTA noted findings concerning for right common femoral and external iliac artery with contained dissections surrounding femoral vascular sheath no evidence of extension of dissection.  Patient transferred to ICU for further observation.  Heparin infusion discontinued.  Gentle fluids given due to CKD and contrast.  Per cardiology will reattempt PCI after femoral artery dissection heals, medical therapy for now.  Continued on aspirin/Plavix/statin.     Respiratory failure/influenza A  -Patient titrated down to room air  -Prednisone for mild scattered wheezing and acute bronchitis likely due to underlying influenza A.  -Continue DuoNebs.  -No known lung disease.  No history of tobacco use.  -CTA chest with prior admission negative for PE.  -No indication for Tamiflu at this time as previously completed.  -PT/OT consulted  -Patient was titrated down on his oxygen and was not requiring supplemental oxygen on  discharge.  With saturation staying well above 90% on room air.     Heart failure/NSTEMI  -Continue GDMT as patient able to tolerate.    -Echo noted combined heart failure with EF 30 to 35%.  -Patient appears to be euvolemic.  -Monitor bradycardia-continue Coreg.  -Entresto initiated.  -Heparin infusion discontinued per cardiology commendations  -Continue aspirin, Plavix, Crestor, Jardiance.  -Patient overall volume status appears improved from prior hospitalization.  Patient was at -9 L balance.   -Cardiology following closely in unit at this time due to right femoral artery dissection.  Per cardiology Dr. Le no further treatment needed for dissection and will heal on its own.  -80% proximal and mid LAD lesions noted unable to stent.  Plan was to reattempt heart cath.  -Kidney function monitored closely.   -off nitro drip  -Cardiology recommending discontinuing amlodipine, started hydralazine 25 mg 3 times daily and hydralazine as needed.  -Added Aldactone 25 mg  -Dr. Myles with cardiology evaluated the patient and discussed with him and his wife.  Dr. Myles discussed recommendations for revascularization of the LAD territory in the setting of cardiomyopathy. Given difficulties during the initial attempt at PCI to the LAD, the patient and his family have requested PCI to the LAD be done in [place], which I will arrange as an outpatient.  Given intervention is not urgent, will have PCI scheduled in several weeks once right femoral dissection and significant right upper extremity bruising have resolved.  Dr. Myles cleared patient for discharge.  Plan on following up in the office in 1 week.    Patient was seen and examined on the day of discharge.  Wife at bedside in addition to another family member.  Patient sitting up comfortably in bed with no stress.  Patient reports doing well today and is eager to go home.  He denies any chest pain or shortness of breath.  He denies any acute complaints today.  Patient  otherwise doing well and improved clinically.  He is not requiring supplemental oxygen on discharge, vital stable, volume status improved.  Patient ambulatory.  Tolerating p.o. well.  I personally discussed with Dr. Myles.  Dr. Myles discussed with patient and his wife as per above.     Patient instructed on management and plan in details.  Discussed medication changes in details with the patient and his wife.  Follow-up with cardiology in 1 week. Patient to follow-up with PCP in 2 to 3 days for recheck and continued care. Clear return precautions discussed.  Patient verbalized understanding and agreeable to plan.  All questions were answered to the patient's satisfaction.  Patient has reached maximum medical improvement of inpatient hospital stay. Patient is discharged in stable condition.    Vital Signs:    Temp:  [97.5 °F (36.4 °C)-99.3 °F (37.4 °C)] 98.1 °F (36.7 °C)  Heart Rate:  [64-83] 77  Resp:  [18] 18  BP: ()/(57-76) 125/62    Physical Exam:    General Appearance:  Alert and cooperative. No acute distress.    Head:  Atraumatic and normocephalic.   Eyes: Conjunctivae and sclerae normal, no icterus. No pallor.   Ears:  Ears with no abnormalities noted.   Throat: No oral lesions, no thrush, oral mucosa moist.   Neck: Supple, trachea midline, no thyromegaly.   Back:   No tenderness to palpation.   Lungs:   Breath sounds heard bilaterally equally.  No crackles or wheezing. No Pleural rub or bronchial breathing. Unlabored. On supplemental oxygen.    Heart:  Normal S1 and S2, no murmur, no gallop, no rub. No JVD.   Abdomen:   Normal bowel sounds, no masses, no organomegaly. Soft, nontender, nondistended, no rebound tenderness.   Extremities: Supple, no edema, no cyanosis, no clubbing.  Right upper arm ecchymosis fading down. Right femoral sheath intact with no ecchymosis. Neurovascular intact.  Full range of motion in all 4 extremities.   Pulses: Pulses palpable bilaterally.   Skin: No bleeding.  Ecchymosis  of right upper arm fading down and improving as per above.   Neurologic: Alert and oriented x 3. No facial asymmetry. Moves all four limbs. No tremors.     Pertinent Lab Results:     Results from last 7 days   Lab Units 02/17/25  0544 02/16/25  0409 02/15/25  0448   SODIUM mmol/L 132* 132* 136   POTASSIUM mmol/L 4.3 4.4 4.1   CHLORIDE mmol/L 101 100 104   CO2 mmol/L 22.4 23.4 23.8   BUN mg/dL 27* 26* 24*   CREATININE mg/dL 1.52* 1.38* 1.31*   CALCIUM mg/dL 8.6 8.3* 8.2*   GLUCOSE mg/dL 128* 135* 116*     Results from last 7 days   Lab Units 02/17/25  0544 02/16/25  0409 02/15/25  0448   WBC 10*3/mm3 7.71 8.10 7.04   HEMOGLOBIN g/dL 12.5* 11.2* 10.9*   HEMATOCRIT % 36.1* 32.0* 31.6*   PLATELETS 10*3/mm3 343 280 287                                   Pertinent Radiology Results:    Imaging Results (All)       Procedure Component Value Units Date/Time    CT Angio Abdominal Aorta Bilateral Iliofem Runoff [[id number]] Collected: 02/12/25 1126     Updated: 02/12/25 1139    Narrative:      CT ANGIOGRAPHY ABDOMEN, PELVIS, EXTREMITIES BILATERALLY     2/12/2025  10:37 AM     HISTORY: Claudication. Possible dissection; J10.1-Influenza due to other  identified influenza virus with other respiratory manifestations;  R79.89-Other specified abnormal findings of blood chemistry;  I21.4-Non-ST elevation (NSTEMI) myocardial infarction; I50.21-Acute  systolic (congestive) heart failure     PROCEDURE: Pre-contrast, Post-contrast images of the abdomen , pelvis  and extremities were performed by computed tomography. Extensive 3 D  reconstruction images were performed. A CTA was performed. This study  was performed with techniques to keep radiation doses as low as  reasonably achievable, (ALARA). Individualized dose reduction techniques  using automated exposure control or adjustment of mA and/or kV according  to the patient size were employed.     COMPARISON:  CT chest 2/4/2025.     FINDINGS:  Abdomen and Pelvis:  Vascular:   Abdominal  aorta is normal in caliber. No evidence of aortic dissection  or aneurysm. Mild aortic atherosclerotic calcifications.     Celiac axis and its branches are patent.     Superior mesenteric artery and its branches are patent.     Inferior mesenteric artery and its branches are patent.     Single right renal artery is patent.     Single left renal artery is patent.        Non-vascular: Mild cardiomegaly. Small hiatal hernia. Moderate right  pleural effusion. Small left pleural effusion. Adjacent lung  atelectasis. Cirrhosis. Left hemiliver 9 mm hyperenhancing focus (series  4, image 79), indeterminate. Mild heterogeneous enhancement of the  hepatic parenchyma. There is a 20 mm heterogeneously hypodense lesion in  the caudate lobe (series 4, image 101), indeterminate, nonemergent MRI  liver mass protocol is recommended. Gallbladder is normal in size. No  biliary ductal dilatation. Pancreatic parenchymal atrophy. 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 Consider nonemergent MRI pancreatic mass protocol for  further evaluation. Spleen is normal in size. Splenic granulomatosis.  Small splenule. No adrenal nodule. Bilateral renal parenchymal scarring.  Bilateral renal cysts, largest 1 in the left renal inferior pole  measuring up to 6.1 cm. 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 Small hiatal hernia. No small  bowel dilatation. Right inguinal hernia containing loops of small bowel  without evidence of complications. Colonic diverticulosis without  evidence of acute diverticulitis. Prostatomegaly. Urinary bladder is  distended without obvious abnormality. Trace bilateral pleural  effusions. There is prominent enhancement surrounding the left testicle,  suggestive of possible varicocele. Consider nonemergent testicular  ultrasound for further evaluation.     Lower Extremities:     Right Lower Extremity:  Mild calcific and  noncalcific atherosclerotic disease of the entire left  lower extremity arterial system.     Right Common Iliac Artery: Patent.     Right External Iliac Artery: Patent. There is extension of dissection  flap along the medial aspect of the external iliac artery (series 6,  image [id number]).     Right Common Femoral Artery: Patent. Right common femoral artery  vascular access sheath in place with tip in the right external iliac  artery. There is a linear intraluminal filling defect surrounding the  vascular sheath (series 4, image [id number]; series 7, image 75), which is  extending into the external iliac artery, compatible with intimal flap,  concerning for possible contained dissection involving the common  femoral and external iliac artery. No arterial occlusion.     Right Deep Femoral Artery: Patent.     Right Superficial Femoral Artery: Patent.     Right Popliteal Artery: Patent.     Right Anterior Tibial Artery: Patent. Moderate multifocal stenosis of  the anterior tibial artery.     Right Tibioperoneal Trunk: Patent.     Right Posterior Tibial Artery: Patent.     Right Peroneal Artery: Patent.     Right Non-Vascular: Non-contributory.           Left Lower Extremity:  Mild calcific and noncalcific atherosclerotic disease of the entire left  lower extremity arterial system.     Left Common Iliac Artery: Patent.     Left External Iliac Artery: Patent.     Left Common Femoral Artery: Patent.     Left Deep Femoral Artery: Patent.     Left Superficial Femoral Artery: Patent.     Left Popliteal Artery: Patent.     Left Anterior Tibial Artery: Patent. Moderate multifocal stenosis of the  anterior tibial artery.     Left Tibioperoneal Trunk: Patent.     Left Posterior Tibial Artery: Patent.     Left Peroneal Artery: Patent.     Left Non-Vascular: Non-contributory.          Impression:      Findings are concerning for right common femoral and external iliac  artery contained dissection surrounding the femoral vascular sheath  with  extension of the intimal flap into the proximal external iliac artery.  There is no evidence of extension of the dissection flap into right  common iliac artery or aorta. No evidence of vascular occlusion.  Correlate clinically.     Other findings as above.     These findings were discussed with MAKENZIE KERN MD on 2/12/2025 11:31 AM  by SENDY Santos, over the phone.        This study was performed with techniques to keep radiation doses as low  as reasonably achievable (ALARA). Individualized dose reduction  techniques using automated exposure control or adjustment of mA and/or  kV according to the patient size were employed.           This report was signed and finalized on 2/12/2025 11:37 AM by SENDY Santos.       XR Chest 1 View [[id number]] Collected: 02/09/25 1309     Updated: 02/09/25 1312    Narrative:      PROCEDURE: XR CHEST 1 VW-     HISTORY: SOA Triage Protocol     COMPARISON: 2/4/2025     FINDINGS:  Portable view of the chest demonstrates pulmonary edema has  improved. Pulmonary vascular congestion is noted. Small pleural  effusions are seen. There is no pneumothorax. The mediastinum is  unremarkable.     The heart size is normal.       Impression:      Mild CHF, significantly improved           This report was signed and finalized on 2/9/2025 1:10 PM by Eric Soria MD.               Echo:    Results for orders placed during the hospital encounter of 02/04/25    Adult Transthoracic Echo Complete W/ Cont if Necessary Per Protocol    Interpretation Summary  1.  Mild left ventricular dilation with moderately reduced LV systolic function, LVEF 30-35%.  2.  Moderate global LV hypokinesis.  3.  Grade 2 diastolic dysfunction.  4.  Normal right ventricular size and systolic function by TAPSE.  5.  Severely increased left atrial volume index.  6.  Mild tricuspid regurgitation.  7.  Left pleural effusion.    Condition on Discharge:      Stable.    Code status during the hospital  stay:    Code Status and Medical Interventions: CPR (Attempt to Resuscitate); Full Support   Ordered at: 02/10/25 1141     Level Of Support Discussed With:    Patient     Code Status (Patient has no pulse and is not breathing):    CPR (Attempt to Resuscitate)     Medical Interventions (Patient has pulse or is breathing):    Full Support     Discharge Disposition:    Home or Self Care    Discharge Medications:       Discharge Medications        New Medications        Instructions Start Date   clopidogrel 75 MG tablet  Commonly known as: PLAVIX   75 mg, Oral, Daily   Start Date: February 18, 2025     furosemide 40 MG tablet  Commonly known as: LASIX   40 mg, Oral, Daily   Start Date: February 18, 2025     hydrALAZINE 25 MG tablet  Commonly known as: APRESOLINE   25 mg, Oral, Every 8 Hours Scheduled      nitroglycerin 0.4 MG SL tablet  Commonly known as: NITROSTAT   0.4 mg, Sublingual, Every 5 Minutes PRN, Take no more than 3 doses in 15 minutes.      rosuvastatin 10 MG tablet  Commonly known as: CRESTOR   10 mg, Oral, Nightly      sacubitril-valsartan 24-26 MG tablet  Commonly known as: ENTRESTO   2 tablets, Oral, Every 12 Hours Scheduled      spironolactone 25 MG tablet  Commonly known as: ALDACTONE   25 mg, Oral, Daily   Start Date: February 18, 2025            Changes to Medications        Instructions Start Date   carvedilol 3.125 MG tablet  Commonly known as: COREG  What changed:   medication strength  how much to take   3.125 mg, Oral, 2 Times Daily With Meals             Continue These Medications        Instructions Start Date   ASPIRIN 81 PO   81 mg, Nightly      clotrimazole-betamethasone 1-0.05 % cream  Commonly known as: LOTRISONE   Apply 1 Application topically to the appropriate area as directed 2 (Two) Times a Day.      empagliflozin 10 MG tablet tablet  Commonly known as: Jardiance   10 mg, Oral, Daily      fenofibrate 145 MG tablet  Commonly known as: TRICOR   145 mg, Nightly      metFORMIN 500 MG  tablet  Commonly known as: GLUCOPHAGE   1,000 mg, Oral, 2 Times Daily With Meals      vitamin D3 125 MCG (5000 UT) capsule capsule   5,000 Units, Daily             Stop These Medications      amLODIPine 5 MG tablet  Commonly known as: NORVASC     lisinopril 40 MG tablet  Commonly known as: PRINIVIL,ZESTRIL     oseltamivir 30 MG capsule  Commonly known as: TAMIFLU            Discharge Diet:   Cardiac    Activity at Discharge:   As tolerated    Follow-up Appointments:    Additional Instructions for the Follow-ups that You Need to Schedule       Ambulatory Referral to Cardiac Rehab   As directed             Follow-up Information       [facility] Follow up on 2/24/2025.    Specialty: Pharmacy  Why: 10:00am  Contact information:  [address]  [facility] [id number] [phone]             Lurdes Aguayo APRN Follow up in 1 week(s).    Specialties: Nurse Practitioner, Cardiology  Contact information:  [address]  [address]  [facility] [place] [address]  [phone]               Dre Henry MD Follow up in 3 day(s).    Specialty: Family Medicine  Contact information:  [address]  [address]  [facility] [address]  [phone]               [facility] .    Specialty: Cardiac Rehabilitation  Contact information:  [address]  [address] [facility] [id number] [phone]                         Future Appointments   Date Time Provider Department Center   2/24/2025 10:00 AM Lurdes Aguayo APRN  [facility]   12/4/2025 11:00 AM Adeel Myles MD [facility]     Test Results Pending at Discharge:    Pending Results       None                 Giuliana Galvan MD  02/17/25  12:10 EST    Time: I spent 28 minutes on this discharge activity which included: face-to-face encounter with the patient, reviewing the data in the system, coordination of the care with the nursing staff as well as  consultants, documentation, and entering orders.     Dictated utilizing Dragon dictation.

## 2025-02-17 NOTE — CASE MANAGEMENT/SOCIAL WORK
Case Management/Social Work    Patient Name:  Abebe Crockett  YOB: 1940  MRN: [id number]  Admit Date:  2/9/2025        10:40 EST   Discharge Plan       Row Name 02/17/25 1039       Plan    Plan Patient plans to discharge home with family once ready. States no needs at this time, eager to return home.                        Electronically signed by:  Grayson Weller RN  02/17/25 10:40 EST

## 2025-02-17 NOTE — PROGRESS NOTES
[facility] Cardiology IP Progress Note    Abebe Crockett  1940  [id number]  02/17/25    Subjective:   Mr. Abebe Crockett is a 84 y.o. male seen in follow-up.  No acute overnight events.  Reports his breathing is now back to baseline.  No chest pain or chest discomfort.  No specific cardiac complaints this morning.    Review of Systems:   Review of Systems   Constitutional:  Negative for activity change, appetite change, chills, diaphoresis, fatigue, fever, unexpected weight gain and unexpected weight loss.   Eyes:  Negative for blurred vision and double vision.   Respiratory:  Negative for cough, chest tightness, shortness of breath and wheezing.    Cardiovascular:  Negative for chest pain, palpitations and leg swelling.   Gastrointestinal:  Negative for abdominal pain, anal bleeding, blood in stool and GERD.   Endocrine: Negative for cold intolerance and heat intolerance.   Genitourinary:  Negative for hematuria.   Neurological:  Negative for dizziness, syncope, weakness and light-headedness.   Hematological:  Does not bruise/bleed easily.   Psychiatric/Behavioral:  Negative for depressed mood and stress. The patient is not nervous/anxious.        I have reviewed and/or updated the patient's past medical, past surgical, family history, social history, problem list and allergies as appropriate in the chart.     Physical Exam:  Vital Signs:   Vitals:    02/16/25 2307 02/16/25 2318 02/17/25 0324 02/17/25 0706   BP: 97/57 138/72 139/76 129/74   BP Location:  Right arm Right arm Right arm   Patient Position:  Lying Lying Lying   Pulse:  68 64 65   Resp:  18 18 18   Temp:  99.3 °F (37.4 °C) 97.8 °F (36.6 °C) 97.5 °F (36.4 °C)   TempSrc:  Oral Oral Oral   SpO2:  93% 95% 96%   Weight:       Height:           Physical Exam  Vitals and nursing note reviewed.   Constitutional:       General: He is not in acute distress.     Appearance: He is not diaphoretic.   HENT:      Head: Normocephalic and  atraumatic.   Cardiovascular:      Rate and Rhythm: Normal rate and regular rhythm.      Heart sounds: No murmur heard.  Pulmonary:      Effort: Pulmonary effort is normal. No respiratory distress.      Breath sounds: Normal breath sounds. No stridor. No wheezing, rhonchi or rales.   Abdominal:      General: Bowel sounds are normal. There is no distension.      Palpations: Abdomen is soft.      Tenderness: There is no abdominal tenderness. There is no guarding or rebound.   Musculoskeletal:         General: No swelling. Normal range of motion.      Cervical back: Neck supple. No tenderness.   Skin:     General: Skin is warm and dry.      Comments: Significant right upper extremity bruising extending to the elbow   Neurological:      General: No focal deficit present.      Mental Status: He is alert and oriented to person, place, and time.   Psychiatric:         Mood and Affect: Mood normal.         Behavior: Behavior normal.         Results Review:   Results from last 7 days   Lab Units 02/17/25  0544   SODIUM mmol/L 132*   POTASSIUM mmol/L 4.3   CHLORIDE mmol/L 101   CO2 mmol/L 22.4   BUN mg/dL 27*   CREATININE mg/dL 1.52*   CALCIUM mg/dL 8.6   GLUCOSE mg/dL 128*         Results from last 7 days   Lab Units 02/17/25  0544 02/16/25  0409 02/15/25  0448   WBC 10*3/mm3 7.71 8.10 7.04   HEMOGLOBIN g/dL 12.5* 11.2* 10.9*   HEMATOCRIT % 36.1* 32.0* 31.6*   PLATELETS 10*3/mm3 343 280 287         Results from last 7 days   Lab Units 02/12/25  0411   MAGNESIUM mg/dL 2.8*           I personally viewed and interpreted the patient's EKG/Telemetry data     Medications:   )  Current Facility-Administered Medications:     acetaminophen (TYLENOL) tablet 650 mg, 650 mg, Oral, Q6H PRN, Giuliana Galvan MD    ALPRAZolam (XANAX) tablet 0.5 mg, 0.5 mg, Oral, BID PRN, Lurdes Bhatti APRN, 0.5 mg at 02/12/25 2100    aluminum-magnesium hydroxide-simethicone (MAALOX MAX) 400-400-40 MG/5ML suspension 15 mL, 15 mL, Oral, Q6H PRN, Mandy  MD Giuliana    aspirin EC tablet 81 mg, 81 mg, Oral, Nightly, Giuliana Galvan MD, 81 mg at 02/16/25 2122    atropine sulfate injection 0.5 mg, 0.5 mg, Intravenous, Q5 Min PRN, Giuliana Galvan MD    sennosides-docusate (PERICOLACE) 8.6-50 MG per tablet 2 tablet, 2 tablet, Oral, BID, 2 tablet at 02/13/25 0935 **AND** polyethylene glycol (MIRALAX) packet 17 g, 17 g, Oral, Daily PRN **AND** bisacodyl (DULCOLAX) EC tablet 5 mg, 5 mg, Oral, Daily PRN **AND** bisacodyl (DULCOLAX) suppository 10 mg, 10 mg, Rectal, Daily PRN, Giuliana Galvan MD    bisacodyl (DULCOLAX) suppository 10 mg, 10 mg, Rectal, Daily PRN, Giuliana Galvan MD    Calcium Replacement - Follow Nurse / BPA Driven Protocol, , Not Applicable, PRN, Giuliana Galvan MD    carvedilol (COREG) tablet 3.125 mg, 3.125 mg, Oral, BID With Meals, Giuliana Galvan MD, 3.125 mg at 02/16/25 1716    clopidogrel (PLAVIX) tablet 75 mg, 75 mg, Oral, Daily, Giuliana Galvan MD, 75 mg at 02/16/25 0842    diphenhydrAMINE (BENADRYL) injection 25 mg, 25 mg, Intravenous, Q6H PRN, Giuliana Galvan MD    empagliflozin (JARDIANCE) tablet 10 mg, 10 mg, Oral, Daily, Giuliana Galvan MD, 10 mg at 02/16/25 0842    fenofibrate (TRICOR) tablet 145 mg, 145 mg, Oral, Nightly, Giuliana Galvan MD, 145 mg at 02/16/25 2123    furosemide (LASIX) tablet 40 mg, 40 mg, Oral, Daily, Giuliana Galvan MD, 40 mg at 02/16/25 0842    hydrALAZINE (APRESOLINE) injection 10 mg, 10 mg, Intravenous, Q4H PRN, Giuliana Galvan MD, 10 mg at 02/15/25 0944    hydrALAZINE (APRESOLINE) tablet 25 mg, 25 mg, Oral, Q8H, Giuliana Galvan MD    iopamidol (ISOVUE-300) 61 % injection 100 mL, 100 mL, Intravenous, Once in imaging, Giuliana Galvan MD    ipratropium-albuterol (DUO-NEB) nebulizer solution 3 mL, 3 mL, Nebulization, Q6H PRN, Giuliana Galvan MD    Magnesium Cardiology Dose Replacement - Follow Nurse / BPA Driven Protocol, , Not Applicable, PRN, Giuliana Galvan MD    magnesium hydroxide (MILK OF MAGNESIA)  suspension 10 mL, 10 mL, Oral, Daily PRN, Giuliana Galvan MD    mupirocin (BACTROBAN) 2 % nasal ointment 1 Application, 1 Application, Each Nare, BID, Elvin Gallegos, , 1 Application at 25    [] Morphine sulfate (PF) injection 1 mg, 1 mg, Intravenous, Q4H PRN, 1 mg at 25 0223 **AND** naloxone (NARCAN) injection 0.4 mg, 0.4 mg, Intravenous, Q5 Min PRN, Giuliana Galvan MD    nitroglycerin (NITROSTAT) SL tablet 0.4 mg, 0.4 mg, Sublingual, Q5 Min PRN, Giuliana Galvan MD    ondansetron ODT (ZOFRAN-ODT) disintegrating tablet 4 mg, 4 mg, Oral, Q6H PRN **OR** ondansetron (ZOFRAN) injection 4 mg, 4 mg, Intravenous, Q6H PRN, Giuliana Galvan MD    Phosphorus Replacement - Follow Nurse / BPA Driven Protocol, , Not Applicable, PRN, Giuliana Galvan MD    Potassium Replacement - Follow Nurse / BPA Driven Protocol, , Not Applicable, PRN, Giuliana Galvan MD    rosuvastatin (CRESTOR) tablet 10 mg, 10 mg, Oral, Nightly, Giuliana Galvan MD, 10 mg at 25    sacubitril-valsartan (ENTRESTO) 24-26 MG tablet 2 tablet, 2 tablet, Oral, Q12H, Giuliana Galavn MD, 2 tablet at 25    sodium chloride 0.9 % flush 10 mL, 10 mL, Intravenous, PRN, Giuliana Galvan MD    sodium chloride 0.9 % flush 10 mL, 10 mL, Intravenous, Q12H, Giuliana Galvan MD, 10 mL at 25    sodium chloride 0.9 % flush 10 mL, 10 mL, Intravenous, PRN, Giuliana Galvan MD, 10 mL at 25 170    sodium chloride 0.9 % infusion 40 mL, 40 mL, Intravenous, PRN, Giuliana Galvan MD    spironolactone (ALDACTONE) tablet 25 mg, 25 mg, Oral, Daily, Giuliana Galvan MD, 25 mg at 25 0841    Assessment:  1.  Acute systolic heart failure  2.  Hypertensive emergency  3.  Coronary artery disease   4.  Acute influenza A, resolved  5.  Acute hypoxic respiratory failure, resolved  6.  Stage III CKD  7.  Type 2 diabetes mellitus     Plan:     1.  Acute systolic heart failure  -- Echocardiogram this admission shows decline in  LV function, LVEF currently 30-35%  -- Several potential etiologies for newly diagnosed cardiomyopathy including ischemic cardiomyopathy, myocarditis in the setting of influenza, hypertensive emergency, etc.  -- Volume status appears to be significantly improved  --Continue carvedilol, Entresto, Jardiance  --Continue daily oral Lasix  --Plan for revascularization of the LAD (see below) as outpatient  --Okay to discharge home today  --Follow-up in the cardiology clinic in 1 week to reassess volume status     2.  Coronary artery disease  -- No anginal symptoms  -- Coronary angiogram showed severe stenosis in the proximal and mid LAD, no PCI performed at that time due to difficulty from a radial approach and right femoral artery dissection which occurred during access  -- Unclear to what degree LAD disease is contributing to cardiomyopathy, however discussed recommendations for revascularization of the LAD territory in the setting of cardiomyopathy  --Given difficulties during the initial attempt at PCI to the LAD, the patient and his family have requested PCI to the LAD be done in [place], which I will arrange as an outpatient.  Given intervention is not urgent, will have PCI scheduled in several weeks once right femoral dissection and significant right upper extremity bruising have resolved  -- Continue aspirin and Plavix  -- Continue high intensity statin      Thank you for allowing me to participate in the care of your patient. Please to not hesitate to contact me with additional questions or concerns.     FLORES Myles MD  Interventional Cardiology   02/17/25  09:45 EST

## 2025-02-21 ENCOUNTER — READMISSION MANAGEMENT (OUTPATIENT)
Dept: CALL CENTER | Facility: HOSPITAL | Age: 85
End: 2025-02-21
Payer: MEDICARE

## 2025-02-21 NOTE — OUTREACH NOTE
AMI Week 1 Survey      Flowsheet Row Responses   [facility] patient discharged from? [facility]   Does the patient have one of the following disease processes/diagnoses(primary or secondary)? Acute MI (STEMI,NSTEMI)   Week 1 attempt successful? Yes   Call start time 1401   Call end time 1404   Discharge diagnosis NSTEMI, heart cath- PCI attempt aborted d/t dissection   Person spoke with today (if not patient) and relationship spouse   Meds reviewed with patient/caregiver? Yes   Is the patient having any side effects they believe may be caused by any medication additions or changes? No   Does the patient have all prescriptions related to this admission filled (includes statins,anticoagulants,HTN meds,anti-arrhythmia meds) Yes   Is the patient taking all medications as directed (includes completed medication regime)? Yes   Does the patient have a primary care provider?  Yes   Does the patient have an appointment with their PCP,cardiologist,or clinic within 7 days of discharge? Greater than 7 days   Comments regarding PCP Dr. Henry-will be next monday   What is preventing the patient from scheduling follow up appointments within 7 days of discharge? --  [Earlier appt was cancelled due to weather.]   Has the patient kept scheduled appointments due by today? Yes   Has all DME been delivered? No   Did the patient receive a copy of their discharge instructions? Yes   Is the patient/caregiver able to teach back ways to prevent a second heart attack: Take medications, Follow up with MD   If the patient is a current smoker, are they able to teach back resources for cessation? Not a smoker   Is the patient/caregiver able to teach back the hierarchy of who to call/visit for symptoms/problems? PCP, Specialist, Home health nurse, Urgent Care, ED, 911 Yes   Additional teach back comments spouse states patient is adhering to low sodium diet and weighs daily.   Week 1 call completed? Yes   Is the patient interested in additional  calls from an ambulatory ? No   Would this patient benefit from a Referral to [facility] Social Work? No   Wrap up additional comments Spouse reports patient is doing well at this time.   Call end time 1402            KIKI TAMEZ - Registered Nurse

## 2025-02-24 ENCOUNTER — DISEASE STATE MANAGEMENT VISIT (OUTPATIENT)
Dept: PHARMACY | Facility: HOSPITAL | Age: 85
End: 2025-02-24
Payer: MEDICARE

## 2025-02-24 ENCOUNTER — TELEPHONE (OUTPATIENT)
Dept: CARDIOLOGY | Facility: CLINIC | Age: 85
End: 2025-02-24
Payer: MEDICARE

## 2025-02-24 VITALS
WEIGHT: 165 LBS | DIASTOLIC BLOOD PRESSURE: 62 MMHG | SYSTOLIC BLOOD PRESSURE: 115 MMHG | BODY MASS INDEX: 25.84 KG/M2 | OXYGEN SATURATION: 98 % | HEART RATE: 67 BPM

## 2025-02-24 DIAGNOSIS — I50.23 ACUTE ON CHRONIC HFREF (HEART FAILURE WITH REDUCED EJECTION FRACTION): Primary | ICD-10-CM

## 2025-02-24 NOTE — TELEPHONE ENCOUNTER
Thank you I wanted to get him in one of the openings before it was taken so that's why I scheduled it without checking with the pt first.

## 2025-02-24 NOTE — PROGRESS NOTES
[facility] Heart Failure Clinic Note    Abebe Crockett  [id number]  02/24/2025    Primary Care Provider: Dre Henry MD   Referring Provider: David Aguilar DO    Chief Complaint: Initial evaluation    History of Present Illness:   Mr. Abebe Crockett is a 84 y.o. male who presents to the HF Clinic for evaluation.   The patient has a past medical history significant for hypertension, dyslipidemia, ARIELLA, and anemia.  His recent hx includes  a 6-day hospitalization for hypertension (169/105), HF (proBNP 12,953 and CXR showed mild edema), NSTEMI (troponins in the 600-700's).  Of note, echocardiography from earlier this month demonstrated LVEF 30-35% and grade 2 diastolic dysfunction.  Th patient underwent coronary angiography demonstrated proximal and mid-LAD stenoses (80%) with R radial access, but PCI was attempted though R femoral artery, but this was aborted due to dissection.  He was also Flu A+ and treated with nebs and supplemental O2 during admission.  He was discharged on Plavix, hydralazine, Crestor, Entresto, spironolactone, hydralazine, and daily Lasix.  He presents today for evaluation.  The patient reports feeling well from a cardiac standpoint.  He denies chest pain, but reports improved SOB since hospital discharge.  He denies dizziness and lower extremity edema.  He continues to take his medications as prescribed without perceived side effects.  There are no other heart failure related concerns at this time.       Review of Systems:   Review of Systems  As Noted in HPI.   I have reviewed and confirmed the accuracy of the ROS as documented by the MA/JEANNETTEN/RN LASHAUN Valerio    I have reviewed and/or updated the patient's past medical, past surgical, family, social history, problem list and allergies as appropriate.     Medications:     Current Outpatient Medications:     ASPIRIN 81 PO, Take 81 mg by mouth Every Night., Disp: , Rfl:     carvedilol (COREG) 3.125 MG  tablet, Take 1 tablet by mouth 2 (Two) Times a Day With Meals for 30 days., Disp: 60 tablet, Rfl: 0    clopidogrel (PLAVIX) 75 MG tablet, Take 1 tablet by mouth Daily for 30 days., Disp: 30 tablet, Rfl: 0    empagliflozin (Jardiance) 10 MG tablet tablet, Take 1 tablet by mouth Daily for 30 days., Disp: 30 tablet, Rfl: 0    fenofibrate (TRICOR) 145 MG tablet, Take 1 tablet by mouth Every Night., Disp: , Rfl:     furosemide (LASIX) 40 MG tablet, Take 1 tablet by mouth Daily for 7 days., Disp: 7 tablet, Rfl: 0    hydrALAZINE (APRESOLINE) 25 MG tablet, Take 1 tablet by mouth Every 8 (Eight) Hours for 30 days., Disp: 90 tablet, Rfl: 0    metFORMIN (GLUCOPHAGE) 500 MG tablet, Take 2 tablets by mouth 2 (Two) Times a Day With Meals., Disp: , Rfl:     nitroglycerin (NITROSTAT) 0.4 MG SL tablet, Place 1 tablet under the tongue Every 5 (Five) Minutes As Needed for Chest Pain for up to 10 days. Take no more than 3 doses in 15 minutes., Disp: 25 tablet, Rfl: 0    rosuvastatin (CRESTOR) 10 MG tablet, Take 1 tablet by mouth Every Night for 90 days., Disp: 90 tablet, Rfl: 0    sacubitril-valsartan (ENTRESTO) 49-51 MG tablet, Take 1 tablet by mouth Every 12 (Twelve) Hours for 30 days., Disp: 60 tablet, Rfl: 0    spironolactone (ALDACTONE) 25 MG tablet, Take 1 tablet by mouth Daily for 30 days., Disp: 30 tablet, Rfl: 0    vitamin D3 125 MCG (5000 UT) capsule capsule, Take 1 capsule by mouth Daily., Disp: , Rfl:     clotrimazole-betamethasone (LOTRISONE) 1-0.05 % cream, Apply 1 Application topically to the appropriate area as directed 2 (Two) Times a Day. (Patient not taking: Reported on 2/24/2025), Disp: , Rfl:     Physical Exam:  Vital Signs:   Vitals:    02/24/25 1017   BP: 115/62   Pulse: 67   SpO2: 98%   Weight: 74.8 kg (165 lb)     Body mass index is 25.84 kg/m².    Physical Exam  Vitals and nursing note reviewed.   Constitutional:       General: He is not in acute distress.  HENT:      Head: Normocephalic and atraumatic.    Neck:      Trachea: Trachea normal.   Cardiovascular:      Rate and Rhythm: Normal rate and regular rhythm.      Pulses: Normal pulses.      Heart sounds: Normal heart sounds. No murmur heard.     No friction rub. No gallop.   Pulmonary:      Effort: Pulmonary effort is normal.      Breath sounds: Normal breath sounds.   Musculoskeletal:      Cervical back: Neck supple.      Right lower leg: No edema.      Left lower leg: No edema.   Skin:     General: Skin is warm and dry.   Neurological:      Mental Status: He is alert and oriented to person, place, and time.   Psychiatric:         Mood and Affect: Mood normal.         Behavior: Behavior normal. Behavior is cooperative.         Thought Content: Thought content does not include suicidal ideation.         Results Review:   I reviewed the patient's new clinical results.    Procedures    Assessment / Plan:   Diagnoses and all orders for this visit:    1. Acute on chronic HFrEF (heart failure with reduced ejection fraction) (Primary)  -     Basic Metabolic Panel; Future        Preventative Cardiology:   Tobacco Cessation: N/A   Advance Care Planning: ACP discussion was declined by the patient. Patient does not have an advance directive, declines further assistance.     Follow Up:   No follow-ups on file.      Thank you for allowing me to participate in the care of your patient. Please to not hesitate to contact me with additional questions or concerns.     LASHAUN Allen

## 2025-02-24 NOTE — TELEPHONE ENCOUNTER
I got him in for 3/10/25 with , that's the first available because Dr. Myles is out of office this week and on call next week. I attempted to reach pt to notify of day and time lmom and will also notify via My Chart.

## 2025-02-24 NOTE — TELEPHONE ENCOUNTER
Due to complicated/attempted PCI during hospitalization with NSTEMI, this patient must have a follow-up with cardiologist as the family has multiple questions related to this.  I am seeing him in the HF Clinic today, but this is a case where it is most appropriate for the patient to follow-up with MD.    The patient is on my schedule for March 5.  Warren, would you help facilitate this schedule change?

## 2025-02-24 NOTE — H&P (VIEW-ONLY)
[facility] Heart Failure Clinic Note    Abebe Crockett  [id number]  02/24/2025    Primary Care Provider: Dre Henry MD   Referring Provider: David Aguilar DO    Chief Complaint: Initial evaluation    History of Present Illness:   Mr. Abebe Crockett is a 84 y.o. male who presents to the HF Clinic for evaluation.   The patient has a past medical history significant for hypertension, dyslipidemia, ARIELLA, and anemia.  His recent hx includes  a 6-day hospitalization for hypertension (169/105), HF (proBNP 12,953 and CXR showed mild edema), NSTEMI (troponins in the 600-700's).  Of note, echocardiography from earlier this month demonstrated LVEF 30-35% and grade 2 diastolic dysfunction.  Th patient underwent coronary angiography demonstrated proximal and mid-LAD stenoses (80%) with R radial access, but PCI was attempted though R femoral artery, but this was aborted due to dissection.  He was also Flu A+ and treated with nebs and supplemental O2 during admission.  He was discharged on Plavix, hydralazine, Crestor, Entresto, spironolactone, hydralazine, and daily Lasix.  He presents today for evaluation.  The patient reports feeling well from a cardiac standpoint.  He denies chest pain, but reports improved SOB since hospital discharge.  He denies dizziness and lower extremity edema.  He continues to take his medications as prescribed without perceived side effects.  There are no other heart failure related concerns at this time.       Review of Systems:   Review of Systems  As Noted in HPI.   I have reviewed and confirmed the accuracy of the ROS as documented by the MA/JEANNETTEN/RN LASHAUN Valerio    I have reviewed and/or updated the patient's past medical, past surgical, family, social history, problem list and allergies as appropriate.     Medications:     Current Outpatient Medications:     ASPIRIN 81 PO, Take 81 mg by mouth Every Night., Disp: , Rfl:     carvedilol (COREG) 3.125 MG  tablet, Take 1 tablet by mouth 2 (Two) Times a Day With Meals for 30 days., Disp: 60 tablet, Rfl: 0    clopidogrel (PLAVIX) 75 MG tablet, Take 1 tablet by mouth Daily for 30 days., Disp: 30 tablet, Rfl: 0    empagliflozin (Jardiance) 10 MG tablet tablet, Take 1 tablet by mouth Daily for 30 days., Disp: 30 tablet, Rfl: 0    fenofibrate (TRICOR) 145 MG tablet, Take 1 tablet by mouth Every Night., Disp: , Rfl:     furosemide (LASIX) 40 MG tablet, Take 1 tablet by mouth Daily for 7 days., Disp: 7 tablet, Rfl: 0    hydrALAZINE (APRESOLINE) 25 MG tablet, Take 1 tablet by mouth Every 8 (Eight) Hours for 30 days., Disp: 90 tablet, Rfl: 0    metFORMIN (GLUCOPHAGE) 500 MG tablet, Take 2 tablets by mouth 2 (Two) Times a Day With Meals., Disp: , Rfl:     nitroglycerin (NITROSTAT) 0.4 MG SL tablet, Place 1 tablet under the tongue Every 5 (Five) Minutes As Needed for Chest Pain for up to 10 days. Take no more than 3 doses in 15 minutes., Disp: 25 tablet, Rfl: 0    rosuvastatin (CRESTOR) 10 MG tablet, Take 1 tablet by mouth Every Night for 90 days., Disp: 90 tablet, Rfl: 0    sacubitril-valsartan (ENTRESTO) 49-51 MG tablet, Take 1 tablet by mouth Every 12 (Twelve) Hours for 30 days., Disp: 60 tablet, Rfl: 0    spironolactone (ALDACTONE) 25 MG tablet, Take 1 tablet by mouth Daily for 30 days., Disp: 30 tablet, Rfl: 0    vitamin D3 125 MCG (5000 UT) capsule capsule, Take 1 capsule by mouth Daily., Disp: , Rfl:     clotrimazole-betamethasone (LOTRISONE) 1-0.05 % cream, Apply 1 Application topically to the appropriate area as directed 2 (Two) Times a Day. (Patient not taking: Reported on 2/24/2025), Disp: , Rfl:     Physical Exam:  Vital Signs:   Vitals:    02/24/25 1017   BP: 115/62   Pulse: 67   SpO2: 98%   Weight: 74.8 kg (165 lb)     Body mass index is 25.84 kg/m².    Physical Exam  Vitals and nursing note reviewed.   Constitutional:       General: He is not in acute distress.  HENT:      Head: Normocephalic and atraumatic.    Neck:      Trachea: Trachea normal.   Cardiovascular:      Rate and Rhythm: Normal rate and regular rhythm.      Pulses: Normal pulses.      Heart sounds: Normal heart sounds. No murmur heard.     No friction rub. No gallop.   Pulmonary:      Effort: Pulmonary effort is normal.      Breath sounds: Normal breath sounds.   Musculoskeletal:      Cervical back: Neck supple.      Right lower leg: No edema.      Left lower leg: No edema.   Skin:     General: Skin is warm and dry.   Neurological:      Mental Status: He is alert and oriented to person, place, and time.   Psychiatric:         Mood and Affect: Mood normal.         Behavior: Behavior normal. Behavior is cooperative.         Thought Content: Thought content does not include suicidal ideation.         Results Review:   I reviewed the patient's new clinical results.    Procedures    Assessment / Plan:   Diagnoses and all orders for this visit:    1. Acute on chronic HFrEF (heart failure with reduced ejection fraction) (Primary)  -     Basic Metabolic Panel; Future        Preventative Cardiology:   Tobacco Cessation: N/A   Advance Care Planning: ACP discussion was declined by the patient. Patient does not have an advance directive, declines further assistance.     Follow Up:   No follow-ups on file.      Thank you for allowing me to participate in the care of your patient. Please to not hesitate to contact me with additional questions or concerns.     LASHAUN Allen

## 2025-02-25 ENCOUNTER — TELEPHONE (OUTPATIENT)
Dept: PHARMACY | Facility: HOSPITAL | Age: 85
End: 2025-02-25
Payer: MEDICARE

## 2025-02-25 ENCOUNTER — TELEPHONE (OUTPATIENT)
Dept: CARDIOLOGY | Facility: CLINIC | Age: 85
End: 2025-02-25
Payer: MEDICARE

## 2025-02-25 DIAGNOSIS — N18.31 STAGE 3A CHRONIC KIDNEY DISEASE: Primary | ICD-10-CM

## 2025-02-25 DIAGNOSIS — I50.23 ACUTE ON CHRONIC HFREF (HEART FAILURE WITH REDUCED EJECTION FRACTION): Primary | ICD-10-CM

## 2025-02-25 RX ORDER — FUROSEMIDE 40 MG/1
40 TABLET ORAL DAILY PRN
Qty: 30 TABLET | Refills: 0 | Status: SHIPPED | OUTPATIENT
Start: 2025-02-25

## 2025-02-25 NOTE — TELEPHONE ENCOUNTER
Spoke with patient's wife and the patient regarding BMP results from this morning. His baseline Scr prior to hospitalization was 1.35. During admission they started Entresto low dose on 2/12 and then increased him to mid dose on 2/17. He did get IV lasix (40mg) on 2/10 and 80mg on 2/11 and then started on Lasix 40mg oral daily. He was started on spironolactone 25mg on 2/14. His Scr jumped to 2.05 yesterday and potassium to 5.4 (from 4.3 on discharge on 2/17).    Given this, he will have nephrology referral placed and see Dr. Myles on 3/10 at 1015. Will stop spironolactone 25mg at this time, may consider re-initiation at lower dose pending Scr in the future. Will also decrease Lasix to 40mg PRN for 2-3lbs weight gain overnight or 5lbs in one week. Patient's wife will call the clinic if he is unable to get fluid off. He will continue Entresto 49/51mg BID. Will repeat Labs on Monday 3/3. Placing order for draw at [facility]

## 2025-02-25 NOTE — TELEPHONE ENCOUNTER
Component  Ref Range & Units 8 d ago  (2/17/25) 9 d ago  (2/16/25) 10 d ago  (2/15/25) 11 d ago  (2/14/25) 12 d ago  (2/13/25) 12 d ago  (2/13/25) 13 d ago  (2/12/25)   Creatinine  0.76 - 1.27 mg/dL 1.52 High  1.38 High  1.31 High  1.50 High   1.46 High  1.44 High        Referral placed to nephrology for CKD.    Joyce,PharmD--WINNIE

## 2025-02-25 NOTE — PROGRESS NOTES
[facility]  Heart Failure Pharmacist Note     Patient Name: Abebe Crockett  :1940  Age: 84 y.o.  Sex: male  Referring Provider: David Aguilar DO   Primary Cardiologist: Adeel Myles MD   Encounter Provider:  LASHAUN Valerio    HPI: Patient presents for initial evaluation in heart failure clinic for management of HFrEF, (EF = 30-35%). PMH otherwise significant for HTN, T2DM, CKD stage III, ARIELLA, and recent NSTEMI. He was recently hospitalized from  -  with HF exacerbation, hypertensive urgency. Flu A bronchitis, and bilateral pulmonary edema requiring IV diuresis. On discharge, home amlodipine was discontinued and Coreg and Jardiance were initiated as preferred GDMT.  He was again hospitalized from  -  with NSTEMI. 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 Plan to re-attempt stent placement once healed. On discharge, amlodipine was discontinued, and Entresto, hydralazine, furosemide and spironolactone were initiated.      In-clinic today, Mr. Crockett presents with his wife. He has no acute complaints, except for some periodic light-headedness since discharge. He denies chest pain, SOB, and swelling. A home log was provided with Blood pressures reading 120-130s / 50-60s and HR in 50-60s. He has been taking all of his newly-prescribed medications without issue. Affordability concerns were addressed as well as questions around heart failure and treatment plans. I educated Mr. Crockett about his heart failure diagnosis, this clinic, and the services we provide.     Labs were collected after clinic visit and resulted on 25. Results showed hyperkalemia (K: 5.4) and a significant serum creatinine increase from value 1 week ago (Scr: 1.52 --> 2.05). Scr during first admission was 1.36. Patient completed 7 day Lasix 40mg daily dose on 25. Please refer to telephone encounter from 25. Contacted patient instructing him to discontinue spironolactone and to proceed taking  lasix PRN - with 2-3 lb weight gain overnight or 5 lb within a week.     Heart Failure GDMT:    Drug Class   Drug   Dose Last Dose Adjustment   Notes   ACEi/ARB/ARNI Entresto 49-51 mg BID  Start 2/18 Initiated on mid dose on hospital d/c   Beta Blocker Carvedilol 3.125 mg BID Decreased 2/18  Bradycardia in hospital when taking 6.25    MRA x  x 2/25 Hold for hyperkalemia    SGLT2i Jardiance 10 mg daily  2/6      Other CV Meds:  Furosemide 40mg daily PRN   Clopidogrel 75mg daily   Rosuvastatin 10mg QHS   Hydralazine 25mg TID + PRN     Medication Use:  Adherence: Good  Past hx of medication use/intolerance: Spironolactone held for hyperkalemia   Affordability: [email or web address] approval 2/24/25    Social Determinants:  Social Drivers of Health     Tobacco Use: Medium Risk (2/24/2025)    Patient History     Smoking Tobacco Use: Former     Smokeless Tobacco Use: Never     Passive Exposure: Never   Alcohol Use: Not At Risk (2/9/2025)    AUDIT-C     Frequency of Alcohol Consumption: Never     Average Number of Drinks: Patient does not drink     Frequency of Binge Drinking: Never   Financial Resource Strain: Low Risk  (2/10/2025)    Overall Financial Resource Strain (CARDIA)     Difficulty of Paying Living Expenses: Not hard at all   Food Insecurity: No Food Insecurity (2/10/2025)    Hunger Vital Sign     Worried About Running Out of Food in the Last Year: Never true     Ran Out of Food in the Last Year: Never true   Transportation Needs: No Transportation Needs (2/10/2025)    PRAPARE - Transportation     Lack of Transportation (Medical): No     Lack of Transportation (Non-Medical): No   Physical Activity: Inactive (2/10/2025)    Exercise Vital Sign     Days of Exercise per Week: 0 days     Minutes of Exercise per Session: 0 min   Stress: No Stress Concern Present (2/10/2025)    [employer] - Occupational Stress Questionnaire     Feeling of Stress : Only a little   Social Connections: Not At  Risk (2/10/2025)    Family and Community Support     Help with Day-to-Day Activities: I don't need any help     Lonely or Isolated: Never   Interpersonal Safety: Not At Risk (2/9/2025)    Abuse Screen     Unsafe at Home or Work/School: no     Feels Threatened by Someone?: no     Does Anyone Keep You from Contacting Others or Doint Things Outside the Home?: no     Physical Sign of Abuse Present: no   Depression: Not at risk (2/10/2025)    PHQ-2     PHQ-2 Score: 0   Housing Stability: Not At Risk (2/10/2025)    Housing Stability     Current Living Arrangements: home     Potentially Unsafe Housing Conditions: none   Utilities: Not At Risk (2/10/2025)    [facility] Utilities     Threatened with loss of utilities: No   Health Literacy: Not At Risk (2/10/2025)    Education     Help with school or training?: No     Preferred Language: English   Employment: Not At Risk (2/10/2025)    Employment     Do you want help finding or keeping work or a job?: I do not need or want help   Disabilities: Not At Risk (2/9/2025)    Disabilities     Concentrating, Remembering, or Making Decisions Difficulty: no     Doing Errands Independently Difficulty: no       Immunization Status:  Pneumococcal: Not documented   Influenza: Not documented   COVID-19: Not documented     OBJECTIVE:  /62   Pulse 67   Wt 74.8 kg (165 lb)   SpO2 98%   BMI 25.84 kg/m²     Body mass index is 25.84 kg/m².  Wt Readings from Last 1 Encounters:   02/24/25 74.8 kg (165 lb)       10-yr ASCVD risk: The ASCVD Risk score (Tangela BOX, et al., 2019) failed to calculate for the following reasons:    The 2019 ASCVD risk score is only valid for ages 40 to 79    Risk score cannot be calculated because patient has a medical history suggesting prior/existing ASCVD    Lab Review:  Renal Function: Estimated Creatinine Clearance: 39.6 mL/min (A) (by C-G formula based on SCr of 1.52 mg/dL (H)).    Lab Results   Component Value Date    PROBNP 12,953.0 (H) 02/09/2025       Results  for orders placed during the hospital encounter of 02/04/25    Adult Transthoracic Echo Complete W/ Cont if Necessary Per Protocol    Interpretation Summary  1.  Mild left ventricular dilation with moderately reduced LV systolic function, LVEF 30-35%.  2.  Moderate global LV hypokinesis.  3.  Grade 2 diastolic dysfunction.  4.  Normal right ventricular size and systolic function by TAPSE.  5.  Severely increased left atrial volume index.  6.  Mild tricuspid regurgitation.  7.  Left pleural effusion.      Does patient have a home BP monitor? Yes  Does patient have a home scale? Yes    Patient Education:  Abebe Crockett was provided written and verbal education during their clinic visit today. Topics reviewed include:  The basics of heart failure (defining heart failure, ejection fraction, stages of disease)  Signs and symptoms of heart failure, self track zones, and when to contact clinic or seek emergency care  Overview of heart failure treatment plan (pharmacologic and non-pharmacologic) and goals of treatment  Tips for success with managing heart failure medications  List of medications that may be used to treat heart failure and how they work in the body    Patient was given a heart failure tracker calendar to document weight, blood pressure, and symptoms/overall feeling each day. Patient was encouraged to complete this daily in order to help guide therapy adjustments. Abebe Crockett expressed understanding.    ASSESSMENT/PLAN:  HFrEF (EF = 30-35%)  STOP spironolactone 25mg daily. Could consider resuming at 12.5mg daily once Scr has improved.   Start furosemide 40mg daily PRN.   Continue carvedilol 3.125mg twice daily. Take with food.   Continue Entresto 49/51mg twice daily.   Continue Jardiance 10mg daily.  Advised patient to call clinic with 2-3 lb weight gain in 24 hrs or >5 lb weight gain in 1 week.         Mikayla Gabriel, Pharmacy Intern  [facility]  02/25/25  11:29 EST

## 2025-02-26 ENCOUNTER — PREP FOR SURGERY (OUTPATIENT)
Dept: OTHER | Facility: HOSPITAL | Age: 85
End: 2025-02-26
Payer: MEDICARE

## 2025-02-26 ENCOUNTER — TELEPHONE (OUTPATIENT)
Dept: CARDIOLOGY | Facility: CLINIC | Age: 85
End: 2025-02-26
Payer: MEDICARE

## 2025-02-26 DIAGNOSIS — I25.10 CORONARY ARTERY DISEASE: Primary | ICD-10-CM

## 2025-02-26 RX ORDER — ACETAMINOPHEN 325 MG/1
650 TABLET ORAL EVERY 4 HOURS PRN
OUTPATIENT
Start: 2025-02-26

## 2025-02-26 RX ORDER — ASPIRIN 81 MG/1
324 TABLET, CHEWABLE ORAL ONCE
OUTPATIENT
Start: 2025-02-26 | End: 2025-02-26

## 2025-02-26 RX ORDER — SODIUM CHLORIDE 0.9 % (FLUSH) 0.9 %
10 SYRINGE (ML) INJECTION EVERY 12 HOURS SCHEDULED
OUTPATIENT
Start: 2025-02-26

## 2025-02-26 RX ORDER — SODIUM CHLORIDE 0.9 % (FLUSH) 0.9 %
10 SYRINGE (ML) INJECTION AS NEEDED
OUTPATIENT
Start: 2025-02-26

## 2025-02-26 RX ORDER — NITROGLYCERIN 0.4 MG/1
0.4 TABLET SUBLINGUAL
OUTPATIENT
Start: 2025-02-26

## 2025-02-26 RX ORDER — ASPIRIN 81 MG/1
81 TABLET ORAL DAILY
OUTPATIENT
Start: 2025-02-27

## 2025-02-26 RX ORDER — SODIUM CHLORIDE 9 MG/ML
40 INJECTION, SOLUTION INTRAVENOUS AS NEEDED
OUTPATIENT
Start: 2025-02-26

## 2025-02-26 NOTE — TELEPHONE ENCOUNTER
They can speak to Dr. Myles or they can be scheduled for an office visit with Reena or myself to discuss LHC if they need questions answered prior to procedure

## 2025-02-26 NOTE — TELEPHONE ENCOUNTER
Patient's wife called with multiple questions regarding LHC. Were you aware of his labs?     Lab Results   Component Value Date    GLUCOSE 128 (H) 02/17/2025    CALCIUM 8.6 02/17/2025     (L) 02/17/2025    K 4.3 02/17/2025    CO2 22.4 02/17/2025     02/17/2025    BUN 27 (H) 02/17/2025    CREATININE 1.52 (H) 02/17/2025    EGFR 45.5 (L) 02/17/2025    BCR 17.8 02/17/2025    ANIONGAP 8.6 02/17/2025

## 2025-03-03 ENCOUNTER — TELEPHONE (OUTPATIENT)
Dept: PHARMACY | Facility: HOSPITAL | Age: 85
End: 2025-03-03
Payer: MEDICARE

## 2025-03-03 ENCOUNTER — LAB (OUTPATIENT)
Dept: LAB | Facility: HOSPITAL | Age: 85
End: 2025-03-03
Payer: MEDICARE

## 2025-03-03 DIAGNOSIS — I50.23 ACUTE ON CHRONIC HFREF (HEART FAILURE WITH REDUCED EJECTION FRACTION): ICD-10-CM

## 2025-03-03 LAB
ANION GAP SERPL CALCULATED.3IONS-SCNC: 7.9 MMOL/L (ref 5–15)
BUN SERPL-MCNC: 24 MG/DL (ref 8–23)
BUN/CREAT SERPL: 17.8 (ref 7–25)
CALCIUM SPEC-SCNC: 8.9 MG/DL (ref 8.6–10.5)
CHLORIDE SERPL-SCNC: 100 MMOL/L (ref 98–107)
CO2 SERPL-SCNC: 24.1 MMOL/L (ref 22–29)
CREAT SERPL-MCNC: 1.35 MG/DL (ref 0.76–1.27)
EGFRCR SERPLBLD CKD-EPI 2021: 52.4 ML/MIN/1.73
GLUCOSE SERPL-MCNC: 139 MG/DL (ref 65–99)
POTASSIUM SERPL-SCNC: 5.1 MMOL/L (ref 3.5–5.2)
SODIUM SERPL-SCNC: 132 MMOL/L (ref 136–145)

## 2025-03-03 PROCEDURE — 80048 BASIC METABOLIC PNL TOTAL CA: CPT

## 2025-03-03 PROCEDURE — 36415 COLL VENOUS BLD VENIPUNCTURE: CPT

## 2025-03-03 NOTE — TELEPHONE ENCOUNTER
"Spoke with patient and wife regarding lab results today. Discussed improvement in Scr.     On 2/18/2025 155.8lbs however, report weight loss following hospital stay; 2/27/2025: 161.2lbs and took Lasix 40mg on 2/28/2025 (no weight recorded); on 3/1: 162.6lbs; today 3/3/2025: 163.8lbs  today. When asked patient's baseline weight he said, \" I don't have a baseline, I would say upper 150s lower 160s\"    They deny bilateral LLE edema today, his appetite is normal, his stomach is normal for him, he is able to sleep laying down at night.    They are unable to take lasix today due to plans this afternoon. Patient does have a procedure with Dr. Garrett on 3/5/2025. They will plan to take Lasix 40mg tomorrow, if fluid does not come off, he will take another 20mg in the around 2-3pm. (Proceeding with possible 20mg in the afternoon tomorrow due to patient increase in weight from 2/27 to 3/1 despite lasix 40mg administration on 2/28).     Pt scheduled to see Dr. Myles on 3/10 and Dr. Torrez on 4/15/2025.   "

## 2025-03-04 PROBLEM — I10 HIGH BLOOD PRESSURE: Status: RESOLVED | Noted: 2019-11-21 | Resolved: 2025-03-04

## 2025-03-04 PROBLEM — E78.5 HYPERLIPIDEMIA LDL GOAL <70: Status: ACTIVE | Noted: 2025-03-04

## 2025-03-04 PROBLEM — I50.22 CHRONIC SYSTOLIC HEART FAILURE: Status: ACTIVE | Noted: 2025-02-06

## 2025-03-04 PROBLEM — R55 SYNCOPE AND COLLAPSE: Status: RESOLVED | Noted: 2020-02-28 | Resolved: 2025-03-04

## 2025-03-04 PROBLEM — J10.1 INFLUENZA A: Status: RESOLVED | Noted: 2025-02-05 | Resolved: 2025-03-04

## 2025-03-04 PROBLEM — I50.23 ACUTE ON CHRONIC HFREF (HEART FAILURE WITH REDUCED EJECTION FRACTION): Status: RESOLVED | Noted: 2025-02-17 | Resolved: 2025-03-04

## 2025-03-04 PROBLEM — I21.4 NSTEMI (NON-ST ELEVATED MYOCARDIAL INFARCTION): Status: RESOLVED | Noted: 2025-02-09 | Resolved: 2025-03-04

## 2025-03-04 PROBLEM — I25.119 CORONARY ARTERY DISEASE INVOLVING NATIVE CORONARY ARTERY OF NATIVE HEART WITH ANGINA PECTORIS: Status: ACTIVE | Noted: 2025-02-17

## 2025-03-04 PROBLEM — J81.0 ACUTE PULMONARY EDEMA: Status: RESOLVED | Noted: 2025-02-05 | Resolved: 2025-03-04

## 2025-03-04 PROBLEM — I51.89 DIASTOLIC DYSFUNCTION: Status: RESOLVED | Noted: 2019-11-21 | Resolved: 2025-03-04

## 2025-03-04 PROBLEM — I16.1 HYPERTENSIVE EMERGENCY: Status: RESOLVED | Noted: 2025-02-04 | Resolved: 2025-03-04

## 2025-03-04 PROBLEM — J96.01 ACUTE RESPIRATORY FAILURE WITH HYPOXIA: Status: RESOLVED | Noted: 2025-02-05 | Resolved: 2025-03-04

## 2025-03-05 ENCOUNTER — HOSPITAL ENCOUNTER (OUTPATIENT)
Facility: HOSPITAL | Age: 85
Setting detail: HOSPITAL OUTPATIENT SURGERY
Discharge: HOME | End: 2025-03-05
Attending: INTERNAL MEDICINE | Admitting: INTERNAL MEDICINE
Payer: MEDICARE

## 2025-03-05 ENCOUNTER — TRANSCRIBE ORDERS (OUTPATIENT)
Dept: CARDIAC REHAB | Facility: HOSPITAL | Age: 85
End: 2025-03-05
Payer: MEDICARE

## 2025-03-05 VITALS
OXYGEN SATURATION: 98 % | WEIGHT: 167.8 LBS | HEART RATE: 64 BPM | HEIGHT: 68 IN | TEMPERATURE: 97.3 F | BODY MASS INDEX: 25.43 KG/M2 | SYSTOLIC BLOOD PRESSURE: 163 MMHG | RESPIRATION RATE: 13 BRPM | DIASTOLIC BLOOD PRESSURE: 81 MMHG

## 2025-03-05 DIAGNOSIS — N18.30 TYPE 2 DIABETES MELLITUS WITH STAGE 3 CHRONIC KIDNEY DISEASE, WITHOUT LONG-TERM CURRENT USE OF INSULIN, UNSPECIFIED WHETHER STAGE 3A OR 3B CKD: ICD-10-CM

## 2025-03-05 DIAGNOSIS — E11.22 TYPE 2 DIABETES MELLITUS WITH STAGE 3 CHRONIC KIDNEY DISEASE, WITHOUT LONG-TERM CURRENT USE OF INSULIN, UNSPECIFIED WHETHER STAGE 3A OR 3B CKD: ICD-10-CM

## 2025-03-05 DIAGNOSIS — I25.119 CORONARY ARTERY DISEASE INVOLVING NATIVE CORONARY ARTERY OF NATIVE HEART WITH ANGINA PECTORIS: Primary | ICD-10-CM

## 2025-03-05 DIAGNOSIS — I25.10 CORONARY ARTERY DISEASE INVOLVING NATIVE CORONARY ARTERY OF NATIVE HEART, UNSPECIFIED WHETHER ANGINA PRESENT: ICD-10-CM

## 2025-03-05 DIAGNOSIS — I50.22 CHRONIC SYSTOLIC HEART FAILURE: ICD-10-CM

## 2025-03-05 DIAGNOSIS — Z95.5 STENTED CORONARY ARTERY: Primary | ICD-10-CM

## 2025-03-05 DIAGNOSIS — I25.10 CORONARY ARTERY DISEASE: ICD-10-CM

## 2025-03-05 LAB
ACT BLD: 250 SECONDS (ref 82–152)
ACT BLD: 262 SECONDS (ref 82–152)
ALBUMIN SERPL-MCNC: 3.3 G/DL (ref 3.5–5.2)
ALBUMIN/GLOB SERPL: 1.6 G/DL
ALP SERPL-CCNC: 31 U/L (ref 39–117)
ALT SERPL W P-5'-P-CCNC: 19 U/L (ref 1–41)
ANION GAP SERPL CALCULATED.3IONS-SCNC: 11 MMOL/L (ref 5–15)
AST SERPL-CCNC: 35 U/L (ref 1–40)
BASOPHILS # BLD AUTO: 0.05 10*3/MM3 (ref 0–0.2)
BASOPHILS NFR BLD AUTO: 0.8 % (ref 0–1.5)
BILIRUB SERPL-MCNC: 0.5 MG/DL (ref 0–1.2)
BUN SERPL-MCNC: 21 MG/DL (ref 8–23)
BUN/CREAT SERPL: 15 (ref 7–25)
CALCIUM SPEC-SCNC: 8.8 MG/DL (ref 8.6–10.5)
CHLORIDE SERPL-SCNC: 95 MMOL/L (ref 98–107)
CHOLEST SERPL-MCNC: 112 MG/DL (ref 0–200)
CO2 SERPL-SCNC: 23 MMOL/L (ref 22–29)
CREAT SERPL-MCNC: 1.4 MG/DL (ref 0.76–1.27)
DEPRECATED RDW RBC AUTO: 46.5 FL (ref 37–54)
EGFRCR SERPLBLD CKD-EPI 2021: 50.2 ML/MIN/1.73
EOSINOPHIL # BLD AUTO: 0.21 10*3/MM3 (ref 0–0.4)
EOSINOPHIL NFR BLD AUTO: 3.5 % (ref 0.3–6.2)
ERYTHROCYTE [DISTWIDTH] IN BLOOD BY AUTOMATED COUNT: 14.3 % (ref 12.3–15.4)
GLOBULIN UR ELPH-MCNC: 2.1 GM/DL
GLUCOSE SERPL-MCNC: 109 MG/DL (ref 65–99)
HBA1C MFR BLD: 5.8 % (ref 4.8–5.6)
HCT VFR BLD AUTO: 30.9 % (ref 37.5–51)
HDLC SERPL-MCNC: 38 MG/DL (ref 40–60)
HGB BLD-MCNC: 10.5 G/DL (ref 13–17.7)
IMM GRANULOCYTES # BLD AUTO: 0.02 10*3/MM3 (ref 0–0.05)
IMM GRANULOCYTES NFR BLD AUTO: 0.3 % (ref 0–0.5)
LDLC SERPL CALC-MCNC: 60 MG/DL (ref 0–100)
LDLC/HDLC SERPL: 1.61 {RATIO}
LYMPHOCYTES # BLD AUTO: 0.66 10*3/MM3 (ref 0.7–3.1)
LYMPHOCYTES NFR BLD AUTO: 11 % (ref 19.6–45.3)
MCH RBC QN AUTO: 30.3 PG (ref 26.6–33)
MCHC RBC AUTO-ENTMCNC: 34 G/DL (ref 31.5–35.7)
MCV RBC AUTO: 89.3 FL (ref 79–97)
MONOCYTES # BLD AUTO: 0.63 10*3/MM3 (ref 0.1–0.9)
MONOCYTES NFR BLD AUTO: 10.5 % (ref 5–12)
NEUTROPHILS NFR BLD AUTO: 4.44 10*3/MM3 (ref 1.7–7)
NEUTROPHILS NFR BLD AUTO: 73.9 % (ref 42.7–76)
NRBC BLD AUTO-RTO: 0 /100 WBC (ref 0–0.2)
PLATELET # BLD AUTO: 217 10*3/MM3 (ref 140–450)
PMV BLD AUTO: 10 FL (ref 6–12)
POTASSIUM SERPL-SCNC: 4.4 MMOL/L (ref 3.5–5.2)
PROT SERPL-MCNC: 5.4 G/DL (ref 6–8.5)
RBC # BLD AUTO: 3.46 10*6/MM3 (ref 4.14–5.8)
SODIUM SERPL-SCNC: 129 MMOL/L (ref 136–145)
TRIGL SERPL-MCNC: 64 MG/DL (ref 0–150)
VLDLC SERPL-MCNC: 14 MG/DL (ref 5–40)
WBC NRBC COR # BLD AUTO: 6.01 10*3/MM3 (ref 3.4–10.8)

## 2025-03-05 PROCEDURE — C1725 CATH, TRANSLUMIN NON-LASER: HCPCS | Performed by: INTERNAL MEDICINE

## 2025-03-05 PROCEDURE — C1761: HCPCS | Performed by: INTERNAL MEDICINE

## 2025-03-05 PROCEDURE — 92972 PERQ TRLUML CORONRY LITHOTRP: CPT | Performed by: INTERNAL MEDICINE

## 2025-03-05 PROCEDURE — 85347 COAGULATION TIME ACTIVATED: CPT

## 2025-03-05 PROCEDURE — 36415 COLL VENOUS BLD VENIPUNCTURE: CPT

## 2025-03-05 PROCEDURE — 25510000001 IOPAMIDOL PER 1 ML: Performed by: INTERNAL MEDICINE

## 2025-03-05 PROCEDURE — C1753 CATH, INTRAVAS ULTRASOUND: HCPCS | Performed by: INTERNAL MEDICINE

## 2025-03-05 PROCEDURE — 92978 ENDOLUMINL IVUS OCT C 1ST: CPT | Performed by: INTERNAL MEDICINE

## 2025-03-05 PROCEDURE — 85025 COMPLETE CBC W/AUTO DIFF WBC: CPT | Performed by: NURSE PRACTITIONER

## 2025-03-05 PROCEDURE — C1874 STENT, COATED/COV W/DEL SYS: HCPCS | Performed by: INTERNAL MEDICINE

## 2025-03-05 PROCEDURE — C1760 CLOSURE DEV, VASC: HCPCS | Performed by: INTERNAL MEDICINE

## 2025-03-05 PROCEDURE — 25010000002 LIDOCAINE PF 1% 1 % SOLUTION: Performed by: INTERNAL MEDICINE

## 2025-03-05 PROCEDURE — C1894 INTRO/SHEATH, NON-LASER: HCPCS | Performed by: INTERNAL MEDICINE

## 2025-03-05 PROCEDURE — C1887 CATHETER, GUIDING: HCPCS | Performed by: INTERNAL MEDICINE

## 2025-03-05 PROCEDURE — S0260 H&P FOR SURGERY: HCPCS | Performed by: INTERNAL MEDICINE

## 2025-03-05 PROCEDURE — C1769 GUIDE WIRE: HCPCS | Performed by: INTERNAL MEDICINE

## 2025-03-05 PROCEDURE — 83036 HEMOGLOBIN GLYCOSYLATED A1C: CPT | Performed by: NURSE PRACTITIONER

## 2025-03-05 PROCEDURE — C9600 PERC DRUG-EL COR STENT SING: HCPCS | Performed by: INTERNAL MEDICINE

## 2025-03-05 PROCEDURE — 92928 PRQ TCAT PLMT NTRAC ST 1 LES: CPT | Performed by: INTERNAL MEDICINE

## 2025-03-05 PROCEDURE — 25010000002 MIDAZOLAM PER 1 MG: Performed by: INTERNAL MEDICINE

## 2025-03-05 PROCEDURE — 80053 COMPREHEN METABOLIC PANEL: CPT | Performed by: NURSE PRACTITIONER

## 2025-03-05 PROCEDURE — 80061 LIPID PANEL: CPT | Performed by: NURSE PRACTITIONER

## 2025-03-05 PROCEDURE — 25810000003 SODIUM CHLORIDE 0.9 % SOLUTION: Performed by: NURSE PRACTITIONER

## 2025-03-05 PROCEDURE — 25010000002 FENTANYL CITRATE (PF) 50 MCG/ML SOLUTION: Performed by: INTERNAL MEDICINE

## 2025-03-05 PROCEDURE — 25010000002 HEPARIN (PORCINE) PER 1000 UNITS: Performed by: INTERNAL MEDICINE

## 2025-03-05 DEVICE — XIENCE SKYPOINT™ EVEROLIMUS ELUTING CORONARY STENT SYSTEM 2.75 MM X 33 MM / RAPID-EXCHANGE
Type: IMPLANTABLE DEVICE | Status: FUNCTIONAL
Brand: XIENCE SKYPOINT™

## 2025-03-05 DEVICE — XIENCE SKYPOINT™ EVEROLIMUS ELUTING CORONARY STENT SYSTEM 3.25 MM X 38 MM / RAPID-EXCHANGE
Type: IMPLANTABLE DEVICE | Site: CORONARY | Status: FUNCTIONAL
Brand: XIENCE SKYPOINT™

## 2025-03-05 RX ORDER — SACUBITRIL AND VALSARTAN 49; 51 MG/1; MG/1
1 TABLET, FILM COATED ORAL 2 TIMES DAILY
Qty: 180 TABLET | Refills: 1 | Status: SHIPPED | OUTPATIENT
Start: 2025-03-07

## 2025-03-05 RX ORDER — ACETAMINOPHEN 325 MG/1
650 TABLET ORAL EVERY 4 HOURS PRN
Status: DISCONTINUED | OUTPATIENT
Start: 2025-03-05 | End: 2025-03-05 | Stop reason: HOSPADM

## 2025-03-05 RX ORDER — FENTANYL CITRATE 50 UG/ML
INJECTION, SOLUTION INTRAMUSCULAR; INTRAVENOUS
Status: DISCONTINUED | OUTPATIENT
Start: 2025-03-05 | End: 2025-03-05 | Stop reason: HOSPADM

## 2025-03-05 RX ORDER — SODIUM CHLORIDE 9 MG/ML
3 INJECTION, SOLUTION INTRAVENOUS CONTINUOUS
Status: ACTIVE | OUTPATIENT
Start: 2025-03-05 | End: 2025-03-05

## 2025-03-05 RX ORDER — NITROGLYCERIN 0.4 MG/1
0.4 TABLET SUBLINGUAL
Status: DISCONTINUED | OUTPATIENT
Start: 2025-03-05 | End: 2025-03-05 | Stop reason: HOSPADM

## 2025-03-05 RX ORDER — SPIRONOLACTONE 25 MG/1
25 TABLET ORAL DAILY
Qty: 90 TABLET | Refills: 1 | Status: SHIPPED | OUTPATIENT
Start: 2025-03-05 | End: 2025-03-10

## 2025-03-05 RX ORDER — IOPAMIDOL 755 MG/ML
INJECTION, SOLUTION INTRAVASCULAR
Status: DISCONTINUED | OUTPATIENT
Start: 2025-03-05 | End: 2025-03-05 | Stop reason: HOSPADM

## 2025-03-05 RX ORDER — METOPROLOL TARTRATE 1 MG/ML
INJECTION, SOLUTION INTRAVENOUS
Status: DISCONTINUED | OUTPATIENT
Start: 2025-03-05 | End: 2025-03-05 | Stop reason: HOSPADM

## 2025-03-05 RX ORDER — ASPIRIN 81 MG/1
81 TABLET ORAL DAILY
Status: DISCONTINUED | OUTPATIENT
Start: 2025-03-06 | End: 2025-03-05 | Stop reason: HOSPADM

## 2025-03-05 RX ORDER — MIDAZOLAM HYDROCHLORIDE 1 MG/ML
INJECTION, SOLUTION INTRAMUSCULAR; INTRAVENOUS
Status: DISCONTINUED | OUTPATIENT
Start: 2025-03-05 | End: 2025-03-05 | Stop reason: HOSPADM

## 2025-03-05 RX ORDER — CARVEDILOL 6.25 MG/1
6.25 TABLET ORAL 2 TIMES DAILY WITH MEALS
Qty: 180 TABLET | Refills: 1 | Status: SHIPPED | OUTPATIENT
Start: 2025-03-05

## 2025-03-05 RX ORDER — SODIUM CHLORIDE 9 MG/ML
40 INJECTION, SOLUTION INTRAVENOUS AS NEEDED
Status: DISCONTINUED | OUTPATIENT
Start: 2025-03-05 | End: 2025-03-05 | Stop reason: HOSPADM

## 2025-03-05 RX ORDER — HEPARIN SODIUM 1000 [USP'U]/ML
INJECTION, SOLUTION INTRAVENOUS; SUBCUTANEOUS
Status: DISCONTINUED | OUTPATIENT
Start: 2025-03-05 | End: 2025-03-05 | Stop reason: HOSPADM

## 2025-03-05 RX ORDER — SODIUM CHLORIDE 0.9 % (FLUSH) 0.9 %
10 SYRINGE (ML) INJECTION AS NEEDED
Status: DISCONTINUED | OUTPATIENT
Start: 2025-03-05 | End: 2025-03-05 | Stop reason: HOSPADM

## 2025-03-05 RX ORDER — SODIUM CHLORIDE 0.9 % (FLUSH) 0.9 %
10 SYRINGE (ML) INJECTION EVERY 12 HOURS SCHEDULED
Status: DISCONTINUED | OUTPATIENT
Start: 2025-03-05 | End: 2025-03-05 | Stop reason: HOSPADM

## 2025-03-05 RX ORDER — ASPIRIN 81 MG/1
324 TABLET, CHEWABLE ORAL ONCE
Status: COMPLETED | OUTPATIENT
Start: 2025-03-05 | End: 2025-03-05

## 2025-03-05 RX ORDER — LIDOCAINE HYDROCHLORIDE 10 MG/ML
INJECTION, SOLUTION EPIDURAL; INFILTRATION; INTRACAUDAL; PERINEURAL
Status: DISCONTINUED | OUTPATIENT
Start: 2025-03-05 | End: 2025-03-05 | Stop reason: HOSPADM

## 2025-03-05 RX ADMIN — SODIUM CHLORIDE 224.4 ML: 9 INJECTION, SOLUTION INTRAVENOUS at 08:06

## 2025-03-05 RX ADMIN — ASPIRIN 324 MG: 81 TABLET, CHEWABLE ORAL at 08:06

## 2025-03-05 NOTE — INTERVAL H&P NOTE
H&P reviewed. The patient was examined and there are no changes to the H&P.      PLAN: [facility] with PCI to LAD w/ OTC    Access: Right Femoral Artery     LASHAUN Erazo

## 2025-03-05 NOTE — Clinical Note
First balloon inflation max pressure = 16 nila. First balloon inflation duration = 15 seconds. Second inflation of balloon - Max pressure = 18 nila. 2nd Inflation of balloon - Duration = 15 seconds. Third inflation of balloon - Max pressure = 20 nila. 3rd Inflation of balloon - Duration = 20 seconds.

## 2025-03-05 NOTE — PROGRESS NOTES
Pt. Referred for Phase II Cardiac Rehab. Staff discussed benefits of exercise, program protocol, and educational material provided. Teach back verified.  Permission granted from patient for staff to fax referral information to outlying program at this time.  Staff faxed referral info to [facility].

## 2025-03-05 NOTE — Clinical Note
First balloon inflation max pressure = 16 nila. First balloon inflation duration = 20 seconds. Second inflation of balloon - Max pressure = 18 nila. 2nd Inflation of balloon - Duration = 20 seconds.

## 2025-03-05 NOTE — Clinical Note
IVL device used: CATH BALN INTRAVASC/LITHO C2PLUS 3X12MM;   Maximum Pressure achieved was 4 nila and   maximum inflation time of 12 seconds.   Total number of inflations: 12.

## 2025-03-05 NOTE — H&P
Cardiology H&P        Reason for evaluation:  Cardiomyopathy  Severe one-vessel CAD    Referring provider: Nico Myles MD  Consulting provider: Tucker Garrett MD    IDENTIFICATION: 84-year-old gentleman who resides in [facility] Problems    Diagnosis  POA    **Coronary artery disease involving native coronary artery of native heart with angina pectoris [I25.119]  No     Priority: High     Echo (2/5/2025): LVEF 32%.  Cardiac catheterization for NSTEMI at [facility] (2/12/2025): Severe 1-vessel CAD involving proximal/mid LAD.  Lesion heavily calcified.      Hyperlipidemia LDL goal <70 [E78.5]  Yes    Chronic systolic heart failure [I50.22]  Yes     Echo (2/5/2025): LVEF 32%.  Cardiac catheterization for NSTEMI at [facility] (2/12/2025): Severe 1-vessel CAD involving proximal/mid LAD.  Lesion heavily calcified.                84-year-old gentleman recently admitted to [facility] in February with acute respiratory failure.  Diagnosed with influenza.  During admission, echocardiogram performed and LVEF around 30%.  Cardiac catheterization was performed in [place] which showed severe one-vessel CAD involving LAD.  PCI was not performed at that time due to access difficulties from the right radial approach and iatrogenic right femoral artery dissection.    Patient was referred by Dr. Nico Myles to [facility] for PCI of the LAD.  The LAD stenosis is calcified and will likely require lesion modification.    Allergies   Allergen Reactions    Atorvastatin Other (See Comments)     MEMORY ISSUES       Prior to Admission medications    Medication Sig Start Date End Date Taking? Authorizing Provider   ASPIRIN 81 PO Take 81 mg by mouth Every Night.   Yes Provider, MD Lydia   carvedilol (COREG) 3.125 MG tablet Take 1 tablet by mouth 2 (Two) Times a Day With Meals for 30 days. 2/17/25 3/19/25 Yes Giuliana Galvan MD   clopidogrel (PLAVIX) 75 MG tablet Take 1 tablet by mouth Daily for 30 days.  2/18/25 3/20/25 Yes Giuliana Galvan MD   empagliflozin (Jardiance) 10 MG tablet tablet Take 1 tablet by mouth Daily for 30 days. 2/6/25 3/8/25 Yes David Aguilar DO   fenofibrate (TRICOR) 145 MG tablet Take 1 tablet by mouth Every Night. 9/14/22  Yes Lydia Munson MD   furosemide (Lasix) 40 MG tablet Take 1 tablet by mouth Daily As Needed (for weight gain 2-3lbs overnight or 5lbs in one week). 2/25/25  Yes Lurdes Aguayo APRN   hydrALAZINE (APRESOLINE) 25 MG tablet Take 1 tablet by mouth Every 8 (Eight) Hours for 30 days. 2/17/25 3/19/25 Yes Giuliana Galvan MD   metFORMIN (GLUCOPHAGE) 500 MG tablet Take 2 tablets by mouth 2 (Two) Times a Day With Meals. 10/23/23  Yes Lydia Munson MD   nitroglycerin (NITROSTAT) 0.4 MG SL tablet Place 1 tablet under the tongue Every 5 (Five) Minutes As Needed for Chest Pain for up to 10 days. Take no more than 3 doses in 15 minutes. 2/17/25 2/27/25  Giuliana Galvan MD   rosuvastatin (CRESTOR) 10 MG tablet Take 1 tablet by mouth Every Night for 90 days. 2/17/25 5/18/25 Yes Giuliana Galvan MD   sacubitril-valsartan (ENTRESTO) 49-51 MG tablet Take 1 tablet by mouth Every 12 (Twelve) Hours for 30 days. 2/17/25 3/19/25 Yes Giuliana Galvan MD   vitamin D3 125 MCG (5000 UT) capsule capsule Take 1 capsule by mouth Daily.   Yes Lydia Munson MD   clotrimazole-betamethasone (LOTRISONE) 1-0.05 % cream Apply 1 Application topically to the appropriate area as directed 2 (Two) Times a Day.  Patient not taking: Reported on 2/24/2025 8/31/22 3/5/25  Lydia Munson MD   spironolactone (ALDACTONE) 25 MG tablet Take 1 tablet by mouth Daily for 30 days. 2/18/25 3/5/25  Giuliana Galvan MD       Past Medical History:   Diagnosis Date    Acute systolic heart failure 02/06/2025    Diabetes mellitus     Hypertension     Sleep apnea     CPAP COMPLIANT       Past Surgical History:   Procedure Laterality Date    CARDIAC CATHETERIZATION N/A 2/12/2025    Procedure:  Left Heart Cath;  Surgeon: Benjamin Le MD;  Location: [facility] CATH INVASIVE LOCATION;  Service: Cardiovascular;  Laterality: N/A;    COLONOSCOPY         Family History   Problem Relation Age of Onset    Cancer Father        Social History     Tobacco Use   Smoking Status Former    Current packs/day: 0.00    Types: Cigarettes    Quit date: 1965    Years since quittin.2    Passive exposure: Never   Smokeless Tobacco Never       Social History     Substance and Sexual Activity   Alcohol Use No         Review of Systems:   ROS         Vital Sign Min/Max for last 24 hours  Temp  Min: 97.3 °F (36.3 °C)  Max: 97.3 °F (36.3 °C)   BP  Min: 144/76  Max: 151/73   Pulse  Min: 76  Max: 79   Resp  Min: 16  Max: 18   SpO2  Min: 96 %  Max: 97 %   No data recorded    No intake or output data in the 24 hours ending 25 0801        Tele: Sinus    Constitutional:       Appearance: Healthy appearance.   Eyes:      General: No scleral icterus.  Neck:      Thyroid: No thyroid mass.      Vascular: No carotid bruit or JVD. JVD normal.   Pulmonary:      Effort: Pulmonary effort is normal.      Breath sounds: Normal breath sounds.   Cardiovascular:      Normal rate. Regular rhythm.      Murmurs: There is no murmur.      No gallop.    Edema:     Peripheral edema absent.   Skin:     General: Skin is warm. There is no cyanosis.   Neurological:      General: No focal deficit present.      Mental Status: Alert.   Psychiatric:         Attention and Perception: Attention normal.              DATA REVIEW:    EKG (2025): Sinus rhythm with anterior lateral T wave inversion suggestive of ischemia    Echo (2025): LVEF 32%.  Global hypokinesis.  Severely dilated left atrium      Results from last 7 days   Lab Units 25  1010   SODIUM mmol/L 132*   POTASSIUM mmol/L 5.1   CHLORIDE mmol/L 100   BUN mg/dL 24*   CREATININE mg/dL 1.35*             Lab Results   Component Value Date    HGBA1C 6.10 (H) 2025     Lab Results    Component Value Date    CHOL 189 02/09/2025    TRIG 155 (H) 02/09/2025    HDL 27 (L) 02/09/2025     (H) 02/09/2025    AST 42 (H) 02/09/2025    ALT 25 02/09/2025     Lab Results   Component Value Date    TROPONINT 689 (C) 02/09/2025    TROPONINT 787 (C) 02/09/2025    TROPONINT 768 (C) 02/09/2025                Cardiomyopathy    Recent NSTEMI    Severe LAD CAD    Hyperlipidemia with target LDL <70               PCI of the LAD with possible intravascular lithotripsy or rotational atherectomy via 7 K[place]ili right femoral access      Electronically signed by Denis Garrett IV, MD, 03/05/25, 8:01 AM EST.

## 2025-03-05 NOTE — Clinical Note
First balloon inflation max pressure = 15 nila. First balloon inflation duration = 20 seconds. Second inflation of balloon - Max pressure = 16 nila. 2nd Inflation of balloon - Duration = 12 seconds. Third inflation of balloon - Max pressure = 16 nila. 3rd Inflation of balloon - Duration = 15 seconds.

## 2025-03-06 ENCOUNTER — CALL CENTER PROGRAMS (OUTPATIENT)
Dept: CALL CENTER | Facility: HOSPITAL | Age: 85
End: 2025-03-06
Payer: MEDICARE

## 2025-03-06 NOTE — OUTREACH NOTE
PCI/Device Survey      Flowsheet Row Responses   Facility patient discharged from? [facility]   Procedure date 03/05/25   Procedure (if device, specify in description) PCI  [Intravascular Lithotripsy]   PCI site Right, Groin   Performing MD Dr. Denis Garrett   Attempt successful? Yes   Call start time 0917   Call end time 0924   Person spoke with today (if not patient) and relationship spouse   Has the patient had any of the following symptoms since discharge? --  [No chest pains or SOB]   Symptom comments The only symptoms patient has is itching. No rashes or fevers. Will and patient will monitor closely   Nursing intervention Reminded to continue to take prescribed medications   Medication comments Adjustments made to medications   Does the patient have any of the following symptoms related to the cath/surgical site? --  [No s/s of bleeding]   Nursing intervention Patient education provided   Does the patient have an appointment scheduled with the cardiologist? Yes   Appointment comments Cardiology appt on   If the patient is a current smoker, are they able to teach back resources for cessation? Not a smoker   Did the patient feel prepared to go home on the same day as the procedure? Yes   Is the patient satisfied with the same day discharge process? Yes   PCI/Device call completed Yes   Wrap up additional comments Spouse reports patient is doing well at this time. Only symptom is itching and she will monitor closely.            Alexander MOSQUERA - Registered Nurse

## 2025-03-10 ENCOUNTER — OFFICE VISIT (OUTPATIENT)
Dept: CARDIOLOGY | Facility: CLINIC | Age: 85
End: 2025-03-10
Payer: MEDICARE

## 2025-03-10 VITALS
OXYGEN SATURATION: 100 % | HEIGHT: 68 IN | HEART RATE: 60 BPM | DIASTOLIC BLOOD PRESSURE: 78 MMHG | WEIGHT: 177.4 LBS | BODY MASS INDEX: 26.89 KG/M2 | SYSTOLIC BLOOD PRESSURE: 134 MMHG

## 2025-03-10 DIAGNOSIS — I25.10 CORONARY ARTERY DISEASE INVOLVING NATIVE CORONARY ARTERY OF NATIVE HEART WITHOUT ANGINA PECTORIS: Primary | ICD-10-CM

## 2025-03-10 DIAGNOSIS — I50.22 CHRONIC SYSTOLIC CONGESTIVE HEART FAILURE: ICD-10-CM

## 2025-03-10 DIAGNOSIS — N18.31 STAGE 3A CHRONIC KIDNEY DISEASE: ICD-10-CM

## 2025-03-10 PROCEDURE — 99213 OFFICE O/P EST LOW 20 MIN: CPT | Performed by: INTERNAL MEDICINE

## 2025-03-10 NOTE — PROGRESS NOTES
"             [facility] Cardiology Office Follow Up Note    Abebe Crockett  [id number]  03/10/2025    Primary Care Provider: Dre Henry MD    Chief Complaint: Routine follow-up    History of Present Illness:   Mr. Abebe Crockett is a  84y.o. male who presents to the Cardiology Clinic for routine follow-up.  The patient has a past medical history significant for hypertension, dyslipidemia, ARIELLA, and anemia.  His recent medical history is significant for admission for volume overload at which time he was found to have acute systolic heart failure with an LVEF 30-35%.  He was also found to have severe one-vessel coronary artery disease, recently undergoing PCI with EDGARDO x 2 to the mid LAD.  Following PCI, the patient reports he is \"feeling better than he has in years.\"  No chest pain or exertional chest discomfort.  He has had lower extremity and scrotal edema, however denies exertional dyspnea or orthopnea.  He is tolerating DAPT without difficulty.  No other specific complaints today.    Past Cardiac Testin. Last Coronary Angio: 3/5/2025   1.  Successful lithotripsy and PCI of the mid LAD with placement of overlapping EDGARDO  2. Prior Stress Testing: None  3. Last Echo: 2025           1.  Mild left ventricular dilation with moderately reduced LV systolic function, LVEF 30-35%.  2.  Moderate global LV hypokinesis.  3.  Grade 2 diastolic dysfunction.  4.  Normal right ventricular size and systolic function by TAPSE.  5.  Severely increased left atrial volume index.  6.  Mild tricuspid regurgitation.  7.  Left pleural effusion.  4. Prior Holter Monitor: None    Review of Systems:   Review of Systems   Constitutional:  Negative for activity change, appetite change, chills, diaphoresis, fatigue, fever, unexpected weight gain and unexpected weight loss.   Eyes:  Negative for blurred vision and double vision.   Respiratory:  Negative for cough, chest tightness, shortness of breath and wheezing.  "   Cardiovascular:  Positive for leg swelling. Negative for chest pain and palpitations.   Gastrointestinal:  Negative for abdominal pain, anal bleeding, blood in stool and GERD.   Endocrine: Negative for cold intolerance and heat intolerance.   Genitourinary:  Negative for hematuria.   Neurological:  Negative for dizziness, syncope, weakness and light-headedness.   Hematological:  Does not bruise/bleed easily.   Psychiatric/Behavioral:  Negative for depressed mood and stress. The patient is not nervous/anxious.        I have reviewed and/or updated the patient's past medical, past surgical, family, social history, problem list and allergies as appropriate.     Medications:     Current Outpatient Medications:     ASPIRIN 81 PO, Take 81 mg by mouth Every Night., Disp: , Rfl:     carvedilol (COREG) 6.25 MG tablet, Take 1 tablet by mouth 2 (Two) Times a Day With Meals., Disp: 180 tablet, Rfl: 1    clopidogrel (PLAVIX) 75 MG tablet, Take 1 tablet by mouth Daily for 30 days., Disp: 30 tablet, Rfl: 0    empagliflozin (Jardiance) 10 MG tablet tablet, Take 1 tablet by mouth Daily., Disp: 90 tablet, Rfl: 1    furosemide (Lasix) 40 MG tablet, Take 1 tablet by mouth Daily As Needed (for weight gain 2-3lbs overnight or 5lbs in one week)., Disp: 30 tablet, Rfl: 0    metFORMIN (GLUCOPHAGE) 500 MG tablet, Take 1 tablet by mouth 2 (Two) Times a Day With Meals., Disp: , Rfl:     nitroglycerin (NITROSTAT) 0.4 MG SL tablet, Place 1 tablet under the tongue Every 5 (Five) Minutes As Needed for Chest Pain for up to 10 days. Take no more than 3 doses in 15 minutes., Disp: 25 tablet, Rfl: 0    rosuvastatin (CRESTOR) 10 MG tablet, Take 1 tablet by mouth Every Night for 90 days., Disp: 90 tablet, Rfl: 0    sacubitril-valsartan (ENTRESTO) 49-51 MG tablet, Take 1 tablet by mouth 2 (Two) Times a Day., Disp: 180 tablet, Rfl: 1    vitamin D3 125 MCG (5000 UT) capsule capsule, Take 1 capsule by mouth Daily., Disp: , Rfl:     Physical Exam:  Vital  "Signs:   Vitals:    03/10/25 1021   BP: 134/78   BP Location: Left arm   Patient Position: Sitting   Cuff Size: Adult   Pulse: 60   SpO2: 100%   Weight: 80.5 kg (177 lb 6.4 oz)   Height: 172.7 cm (68\")       Physical Exam  Constitutional:       General: He is not in acute distress.     Appearance: Normal appearance. He is not diaphoretic.   HENT:      Head: Normocephalic and atraumatic.   Cardiovascular:      Rate and Rhythm: Normal rate and regular rhythm.      Heart sounds: No murmur heard.  Pulmonary:      Effort: Pulmonary effort is normal. No respiratory distress.      Breath sounds: Normal breath sounds. No stridor. No wheezing, rhonchi or rales.   Abdominal:      General: Bowel sounds are normal. There is no distension.      Palpations: Abdomen is soft.      Tenderness: There is no abdominal tenderness. There is no guarding or rebound.   Musculoskeletal:         General: No swelling. Normal range of motion.      Cervical back: Neck supple. No tenderness.   Skin:     General: Skin is warm and dry.   Neurological:      General: No focal deficit present.      Mental Status: He is alert and oriented to person, place, and time.   Psychiatric:         Mood and Affect: Mood normal.         Behavior: Behavior normal.         Results Review:   I reviewed the patient's new clinical results.        Assessment / Plan:     1.  Coronary artery disease  -- Now status post shockwave lithotripsy and PCI with EDGARDO x 2 to the mid LAD  -- No current anginal symptoms  -- Continue DAPT with aspirin and Plavix  -- Continue Crestor    2.  Chronic systolic heart failure  -- LVEF 30-35% on last assessment  -- Mild volume overload on exam today  -- Advised to take Lasix daily until lower extremity edema has improved and weight returns to baseline  -- Continue carvedilol and Entresto  -- Holding Farxiga due to concern for intolerance    3.  Stage III CKD  -- Renal function stable    Follow Up:   Return in about 3 months (around " 6/10/2025).      Thank you for allowing me to participate in the care of your patient. Please to not hesitate to contact me with additional questions or concerns.     FLORES Myles MD  Interventional Cardiology   03/10/2025  10:21 EDT

## 2025-03-13 ENCOUNTER — TREATMENT (OUTPATIENT)
Dept: CARDIAC REHAB | Facility: HOSPITAL | Age: 85
End: 2025-03-13
Payer: MEDICARE

## 2025-03-13 DIAGNOSIS — Z95.5 STATUS POST CORONARY ARTERY STENT PLACEMENT: Primary | ICD-10-CM

## 2025-03-13 PROCEDURE — 93798 PHYS/QHP OP CAR RHAB W/ECG: CPT

## 2025-03-13 NOTE — PROGRESS NOTES
Pt was seen today in CR for a Phase II visit. Vital signs and session notes recorded in [employer] and will be scanned into Epic by HIM.

## 2025-03-17 RX ORDER — CLOPIDOGREL BISULFATE 75 MG/1
TABLET ORAL
Qty: 30 TABLET | Refills: 0 | Status: SHIPPED | OUTPATIENT
Start: 2025-03-17

## 2025-03-17 NOTE — TELEPHONE ENCOUNTER
Pt's wife called for a refill on Clopidogrel 75mg. Spoke with Nichelle. Per Dr. Myles's last note continue DAPT with plavix and ASA. Sent to [facility].

## 2025-03-18 ENCOUNTER — TREATMENT (OUTPATIENT)
Dept: CARDIAC REHAB | Facility: HOSPITAL | Age: 85
End: 2025-03-18
Payer: MEDICARE

## 2025-03-18 DIAGNOSIS — Z95.5 STATUS POST CORONARY ARTERY STENT PLACEMENT: Primary | ICD-10-CM

## 2025-03-18 PROCEDURE — 93798 PHYS/QHP OP CAR RHAB W/ECG: CPT

## 2025-03-18 NOTE — PROGRESS NOTES
Pt was seen today in CR for a Phase 2 visit.  Vital signs and session notes recorded in [employer] and will be scanned into Epic by HIM.

## 2025-03-20 ENCOUNTER — TRANSCRIBE ORDERS (OUTPATIENT)
Dept: LAB | Facility: HOSPITAL | Age: 85
End: 2025-03-20
Payer: MEDICARE

## 2025-03-20 ENCOUNTER — TREATMENT (OUTPATIENT)
Dept: CARDIAC REHAB | Facility: HOSPITAL | Age: 85
End: 2025-03-20
Payer: MEDICARE

## 2025-03-20 ENCOUNTER — TELEPHONE (OUTPATIENT)
Dept: CARDIOLOGY | Facility: CLINIC | Age: 85
End: 2025-03-20
Payer: MEDICARE

## 2025-03-20 ENCOUNTER — LAB (OUTPATIENT)
Dept: LAB | Facility: HOSPITAL | Age: 85
End: 2025-03-20
Payer: MEDICARE

## 2025-03-20 DIAGNOSIS — E78.2 MIXED HYPERLIPIDEMIA: ICD-10-CM

## 2025-03-20 DIAGNOSIS — E11.65 INADEQUATELY CONTROLLED DIABETES MELLITUS: ICD-10-CM

## 2025-03-20 DIAGNOSIS — N18.31 CHRONIC KIDNEY DISEASE (CKD) STAGE G3A/A1, MODERATELY DECREASED GLOMERULAR FILTRATION RATE (GFR) BETWEEN 45-59 ML/MIN/1.73 SQUARE METER AND ALBUMINURIA CREATININE RATIO LESS THAN 30 MG/G (CMS/H*: ICD-10-CM

## 2025-03-20 DIAGNOSIS — Z95.5 STATUS POST CORONARY ARTERY STENT PLACEMENT: Primary | ICD-10-CM

## 2025-03-20 DIAGNOSIS — I50.22 CHRONIC SYSTOLIC HEART FAILURE: ICD-10-CM

## 2025-03-20 DIAGNOSIS — E55.9 AVITAMINOSIS D: ICD-10-CM

## 2025-03-20 DIAGNOSIS — N18.31 CHRONIC KIDNEY DISEASE (CKD) STAGE G3A/A1, MODERATELY DECREASED GLOMERULAR FILTRATION RATE (GFR) BETWEEN 45-59 ML/MIN/1.73 SQUARE METER AND ALBUMINURIA CREATININE RATIO LESS THAN 30 MG/G (CMS/H*: Primary | ICD-10-CM

## 2025-03-20 DIAGNOSIS — I50.23 ACUTE ON CHRONIC HFREF (HEART FAILURE WITH REDUCED EJECTION FRACTION): ICD-10-CM

## 2025-03-20 DIAGNOSIS — I25.119 CORONARY ARTERY DISEASE INVOLVING NATIVE CORONARY ARTERY OF NATIVE HEART WITH ANGINA PECTORIS: ICD-10-CM

## 2025-03-20 LAB
25(OH)D3 SERPL-MCNC: 28.4 NG/ML (ref 30–100)
ALBUMIN SERPL-MCNC: 3.6 G/DL (ref 3.5–5.2)
ALBUMIN/GLOB SERPL: 1.6 G/DL
ALP SERPL-CCNC: 38 U/L (ref 39–117)
ALT SERPL W P-5'-P-CCNC: 23 U/L (ref 1–41)
ANION GAP SERPL CALCULATED.3IONS-SCNC: 10.3 MMOL/L (ref 5–15)
AST SERPL-CCNC: 38 U/L (ref 1–40)
BACTERIA UR QL AUTO: NORMAL /HPF
BASOPHILS # BLD AUTO: 0.06 10*3/MM3 (ref 0–0.2)
BASOPHILS NFR BLD AUTO: 1 % (ref 0–1.5)
BILIRUB SERPL-MCNC: 0.4 MG/DL (ref 0–1.2)
BILIRUB UR QL STRIP: NEGATIVE
BUN SERPL-MCNC: 17 MG/DL (ref 8–23)
BUN/CREAT SERPL: 12.8 (ref 7–25)
CALCIUM SPEC-SCNC: 9.3 MG/DL (ref 8.6–10.5)
CHLORIDE SERPL-SCNC: 97 MMOL/L (ref 98–107)
CHOLEST SERPL-MCNC: 130 MG/DL (ref 0–200)
CLARITY UR: CLEAR
CO2 SERPL-SCNC: 25.7 MMOL/L (ref 22–29)
COLOR UR: YELLOW
CREAT SERPL-MCNC: 1.33 MG/DL (ref 0.76–1.27)
CREAT UR-MCNC: 17.6 MG/DL
DEPRECATED RDW RBC AUTO: 45.9 FL (ref 37–54)
EGFRCR SERPLBLD CKD-EPI 2021: 53.4 ML/MIN/1.73
EOSINOPHIL # BLD AUTO: 0.21 10*3/MM3 (ref 0–0.4)
EOSINOPHIL NFR BLD AUTO: 3.5 % (ref 0.3–6.2)
ERYTHROCYTE [DISTWIDTH] IN BLOOD BY AUTOMATED COUNT: 13.2 % (ref 12.3–15.4)
GLOBULIN UR ELPH-MCNC: 2.2 GM/DL
GLUCOSE SERPL-MCNC: 87 MG/DL (ref 65–99)
GLUCOSE UR STRIP-MCNC: NEGATIVE MG/DL
HBA1C MFR BLD: 6 % (ref 4.8–5.6)
HCT VFR BLD AUTO: 31.3 % (ref 37.5–51)
HDLC SERPL-MCNC: 38 MG/DL (ref 40–60)
HGB BLD-MCNC: 10.1 G/DL (ref 13–17.7)
HGB UR QL STRIP.AUTO: NEGATIVE
HYALINE CASTS UR QL AUTO: NORMAL /LPF
IMM GRANULOCYTES # BLD AUTO: 0.02 10*3/MM3 (ref 0–0.05)
IMM GRANULOCYTES NFR BLD AUTO: 0.3 % (ref 0–0.5)
KETONES UR QL STRIP: NEGATIVE
LDLC SERPL CALC-MCNC: 74 MG/DL (ref 0–100)
LDLC/HDLC SERPL: 1.93 {RATIO}
LEUKOCYTE ESTERASE UR QL STRIP.AUTO: NEGATIVE
LYMPHOCYTES # BLD AUTO: 0.63 10*3/MM3 (ref 0.7–3.1)
LYMPHOCYTES NFR BLD AUTO: 10.4 % (ref 19.6–45.3)
MAGNESIUM SERPL-MCNC: 1.8 MG/DL (ref 1.6–2.4)
MCH RBC QN AUTO: 30.5 PG (ref 26.6–33)
MCHC RBC AUTO-ENTMCNC: 32.3 G/DL (ref 31.5–35.7)
MCV RBC AUTO: 94.6 FL (ref 79–97)
MONOCYTES # BLD AUTO: 0.69 10*3/MM3 (ref 0.1–0.9)
MONOCYTES NFR BLD AUTO: 11.3 % (ref 5–12)
NEUTROPHILS NFR BLD AUTO: 4.47 10*3/MM3 (ref 1.7–7)
NEUTROPHILS NFR BLD AUTO: 73.5 % (ref 42.7–76)
NITRITE UR QL STRIP: NEGATIVE
NRBC BLD AUTO-RTO: 0 /100 WBC (ref 0–0.2)
PH UR STRIP.AUTO: 7 [PH] (ref 5–8)
PLATELET # BLD AUTO: 296 10*3/MM3 (ref 140–450)
PMV BLD AUTO: 9.9 FL (ref 6–12)
POTASSIUM SERPL-SCNC: 4.9 MMOL/L (ref 3.5–5.2)
PROT ?TM UR-MCNC: 59.8 MG/DL
PROT SERPL-MCNC: 5.8 G/DL (ref 6–8.5)
PROT UR QL STRIP: ABNORMAL
PROT/CREAT UR: 3397.7 MG/G CREA (ref 0–200)
RBC # BLD AUTO: 3.31 10*6/MM3 (ref 4.14–5.8)
RBC # UR STRIP: NORMAL /HPF
REF LAB TEST METHOD: NORMAL
SODIUM SERPL-SCNC: 133 MMOL/L (ref 136–145)
SP GR UR STRIP: 1.01 (ref 1–1.03)
SQUAMOUS #/AREA URNS HPF: NORMAL /HPF
TRIGL SERPL-MCNC: 94 MG/DL (ref 0–150)
URATE SERPL-MCNC: 6 MG/DL (ref 3.4–7)
UROBILINOGEN UR QL STRIP: ABNORMAL
VLDLC SERPL-MCNC: 18 MG/DL (ref 5–40)
WBC # UR STRIP: NORMAL /HPF
WBC NRBC COR # BLD AUTO: 6.08 10*3/MM3 (ref 3.4–10.8)

## 2025-03-20 PROCEDURE — 84550 ASSAY OF BLOOD/URIC ACID: CPT

## 2025-03-20 PROCEDURE — 83735 ASSAY OF MAGNESIUM: CPT

## 2025-03-20 PROCEDURE — 93798 PHYS/QHP OP CAR RHAB W/ECG: CPT

## 2025-03-20 PROCEDURE — 82306 VITAMIN D 25 HYDROXY: CPT

## 2025-03-20 PROCEDURE — 82570 ASSAY OF URINE CREATININE: CPT

## 2025-03-20 PROCEDURE — 83036 HEMOGLOBIN GLYCOSYLATED A1C: CPT

## 2025-03-20 PROCEDURE — 36415 COLL VENOUS BLD VENIPUNCTURE: CPT

## 2025-03-20 PROCEDURE — 80061 LIPID PANEL: CPT

## 2025-03-20 PROCEDURE — 81001 URINALYSIS AUTO W/SCOPE: CPT

## 2025-03-20 PROCEDURE — 85025 COMPLETE CBC W/AUTO DIFF WBC: CPT

## 2025-03-20 PROCEDURE — 84156 ASSAY OF PROTEIN URINE: CPT

## 2025-03-20 PROCEDURE — 80053 COMPREHEN METABOLIC PANEL: CPT

## 2025-03-20 NOTE — TELEPHONE ENCOUNTER
The patient and his wife came to the HF Clinic today, however there was no appointment scheduled for them today.  Both he and his wife deny any health issues today.  They are wanting to know if refills had been sent for Plavix and Entresto.  The patient states that someone scheduled him to be here before he went to cardiac rehab, however, Epic shows that he has a cardiac rehab appointment at 10.  Given the patient is having no issues and just saw Dr. Myles 10 days ago, he can continue follow-up with cardiology as scheduled in June.

## 2025-03-20 NOTE — PROGRESS NOTES
Pt was seen today in CR for a Phase 2 visit.  Vital signs and session notes recorded in [employer] and will be scanned into Epic by HIM.

## 2025-03-25 ENCOUNTER — TREATMENT (OUTPATIENT)
Dept: CARDIAC REHAB | Facility: HOSPITAL | Age: 85
End: 2025-03-25
Payer: MEDICARE

## 2025-03-25 DIAGNOSIS — Z95.5 STATUS POST CORONARY ARTERY STENT PLACEMENT: Primary | ICD-10-CM

## 2025-03-25 PROCEDURE — 93798 PHYS/QHP OP CAR RHAB W/ECG: CPT

## 2025-03-25 NOTE — PROGRESS NOTES
Pt was seen today in CR for a Phase II visit. Vital signs and session notes recorded in [employer] and will be scanned into Epic by HIM.

## 2025-03-27 ENCOUNTER — TREATMENT (OUTPATIENT)
Dept: CARDIAC REHAB | Facility: HOSPITAL | Age: 85
End: 2025-03-27
Payer: MEDICARE

## 2025-03-27 DIAGNOSIS — Z95.5 STATUS POST CORONARY ARTERY STENT PLACEMENT: Primary | ICD-10-CM

## 2025-03-27 PROCEDURE — 93798 PHYS/QHP OP CAR RHAB W/ECG: CPT

## 2025-03-27 NOTE — PROGRESS NOTES
Pt was seen today in CR for a Phase II visit. Vital signs and session notes recorded in [employer] and will be scanned into Epic by HIM.

## 2025-04-01 ENCOUNTER — TREATMENT (OUTPATIENT)
Dept: CARDIAC REHAB | Facility: HOSPITAL | Age: 85
End: 2025-04-01
Payer: MEDICARE

## 2025-04-01 DIAGNOSIS — Z95.5 STATUS POST CORONARY ARTERY STENT PLACEMENT: Primary | ICD-10-CM

## 2025-04-01 PROCEDURE — 93798 PHYS/QHP OP CAR RHAB W/ECG: CPT

## 2025-04-01 NOTE — PROGRESS NOTES
Pt was seen today in CR for a Phase II visit. Vital signs and session notes recorded in [employer] and will be scanned into Epic by HIM.

## 2025-04-03 ENCOUNTER — TREATMENT (OUTPATIENT)
Dept: CARDIAC REHAB | Facility: HOSPITAL | Age: 85
End: 2025-04-03
Payer: MEDICARE

## 2025-04-03 ENCOUNTER — TELEPHONE (OUTPATIENT)
Dept: PHARMACY | Facility: HOSPITAL | Age: 85
End: 2025-04-03
Payer: MEDICARE

## 2025-04-03 DIAGNOSIS — Z95.5 STATUS POST CORONARY ARTERY STENT PLACEMENT: Primary | ICD-10-CM

## 2025-04-03 PROCEDURE — 93798 PHYS/QHP OP CAR RHAB W/ECG: CPT

## 2025-04-03 NOTE — DISCHARGE INSTRUCTIONS
The following information and instructions were given:    Do not eat, drink, smoke or chew gum after midnight the night before surgery. This includes no mints.  Take all routine, prescribed medications including heart and blood pressure medicines with a sip of water unless otherwise instructed by your physician.   Do NOT take diabetic medication unless instructed by your physician.      DO NOT shave for two days before your procedure.  Do not wear makeup.      DO NOT wear fingernail polish (gel/regular) and/or acrylic/artificial nails on the day of surgery. If you had a recent manicure and would rather not remove polish or artificial nails, the minimum requirement is that the polish/artificial nails must be removed from the middle finger on each hand.      If you are having surgery/procedure on an upper extremity, fingernail polish/artificial fingernails must be removed for surgery.  NO EXCEPTIONS.      If you are having surgery/procedure on a lower extremity, toenail polish on both extremities must be removed for surgery.  NO EXCEPTIONS.    Remove all jewelry (advise to go to jeweler if unable to remove).  Jewelry, especially rings, can no longer be taped for surgery.    Leave anything you consider valuable at home.    Leave your suitcase in the car until after your surgery if you are staying overnight.    Bring the following with you the day of your procedure (when applicable):       -Picture ID and insurance cards       -Co-pay/deductible required by insurance       -Medications in the original bottles or a detailed list (name, dosage, frequency of medications) including all over-the-counter medications if not brought to PAT       -Copy of advance directive, living will or power of  documents if not brought to PAT       -CPAP or BIPAP mask and tubing (do not bring machine)       -Skin prep instruction(s) sheet       -PAT Pass    Educational handout or binder (joint replacements) related to procedure given  Yes no more prazosin and we keep him on 25 mg of hydroxyzine as needed for now.   to patient.  Educational handout also includes general information related to the recovery that mentions signs and symptoms of infection and when to call the doctor.    When applicable, an ERAS handout was given to patient.    Respirex use and pneumonia prevention education provided in Pre Admission Testing general education video.    Information related to infection and hand hygiene mentioned in Pre Admission Testing general education video. Patient instructed to call their doctor if any of the following symptoms are noted during recovery:  Fever of 100.4 F or higher, incision that is warm or has increasing bleeding, redness or drainage.    DVT Prevention instructions given in general education video presentation during Pre Admission Testing appointment that stress the importance of ambulation to improve blood circulation.  Also encouraged patient to perform foot exercises when in bed and application of a sequential device may be applied to lower extremities to improve circulation.      Please apply Chlorhexidine wipes to surgical area (if instructed) the night before procedure and the AM of procedure and document date/time of applications on skin prep instruction sheet.

## 2025-04-03 NOTE — PROGRESS NOTES
Pt was seen today in CR for a Phase II visit. Vital signs and session notes recorded in [employer] and will be scanned into Epic by HIM.

## 2025-04-08 ENCOUNTER — TREATMENT (OUTPATIENT)
Dept: CARDIAC REHAB | Facility: HOSPITAL | Age: 85
End: 2025-04-08
Payer: MEDICARE

## 2025-04-08 DIAGNOSIS — Z95.5 STATUS POST CORONARY ARTERY STENT PLACEMENT: Primary | ICD-10-CM

## 2025-04-08 PROCEDURE — 93798 PHYS/QHP OP CAR RHAB W/ECG: CPT

## 2025-04-08 NOTE — PROGRESS NOTES
Pt was seen today in CR for a Phase II visit. Vital signs and session notes recorded in [employer] and will be scanned into Epic by HIM.

## 2025-04-10 ENCOUNTER — TREATMENT (OUTPATIENT)
Dept: CARDIAC REHAB | Facility: HOSPITAL | Age: 85
End: 2025-04-10
Payer: MEDICARE

## 2025-04-10 DIAGNOSIS — Z95.5 STATUS POST CORONARY ARTERY STENT PLACEMENT: Primary | ICD-10-CM

## 2025-04-10 PROCEDURE — 93798 PHYS/QHP OP CAR RHAB W/ECG: CPT

## 2025-04-10 NOTE — PROGRESS NOTES
Pt was seen today in CR for a Phase II visit. Vital signs and session notes recorded in [employer] and will be scanned into Epic by HIM.

## 2025-04-14 RX ORDER — CLOPIDOGREL BISULFATE 75 MG/1
75 TABLET ORAL DAILY
Qty: 30 TABLET | Refills: 3 | Status: SHIPPED | OUTPATIENT
Start: 2025-04-14

## 2025-04-15 ENCOUNTER — TREATMENT (OUTPATIENT)
Dept: CARDIAC REHAB | Facility: HOSPITAL | Age: 85
End: 2025-04-15
Payer: MEDICARE

## 2025-04-15 DIAGNOSIS — Z95.5 STATUS POST CORONARY ARTERY STENT PLACEMENT: Primary | ICD-10-CM

## 2025-04-15 PROCEDURE — 93798 PHYS/QHP OP CAR RHAB W/ECG: CPT

## 2025-04-16 ENCOUNTER — TELEPHONE (OUTPATIENT)
Dept: CARDIOLOGY | Facility: CLINIC | Age: 85
End: 2025-04-16
Payer: MEDICARE

## 2025-04-16 NOTE — TELEPHONE ENCOUNTER
If his wife wants him to be seen today there are some openings on my schedule.    If they are comfortable doing as needed Lasix at home he can take 1 tablet today and 1 tablet tomorrow and override him for an 8:15 appointment with me on Friday morning.    If she is concerned that he is in distress, he should go to the ER.

## 2025-04-16 NOTE — TELEPHONE ENCOUNTER
Hub staff attempted to follow warm transfer process and was unsuccessful     Caller: RENATO BASS    Relationship to patient: Emergency Contact    Best call back number: [phone]    Patient is needing: PATIENT RETURNING THE CALL. PLEASE CALL BACK ASAP

## 2025-04-16 NOTE — TELEPHONE ENCOUNTER
Caller: RENATO BASS    Relationship to patient: Emergency Contact    Best call back number: [phone]    Chief complaint: PATIENT IS RETAINING FLUID AND PER PCP WANTS TO BE SEEN BY US PLEASE ADVISE    Type of visit: FOLLOW-UP    Requested date: ASAP

## 2025-04-17 ENCOUNTER — TREATMENT (OUTPATIENT)
Dept: CARDIAC REHAB | Facility: HOSPITAL | Age: 85
End: 2025-04-17
Payer: MEDICARE

## 2025-04-17 DIAGNOSIS — Z95.5 STATUS POST CORONARY ARTERY STENT PLACEMENT: Primary | ICD-10-CM

## 2025-04-17 PROCEDURE — 93798 PHYS/QHP OP CAR RHAB W/ECG: CPT

## 2025-04-17 NOTE — PROGRESS NOTES
[facility] Cardiology    Office Consult     Abebe Crockett  [id number]  2025    Referred By: No ref. provider found    Chief Complaint   Patient presents with    Follow-up    Edema       Subjective     History of Present Illness:   Abebe Crockett is a 84 y.o. male who presents to the Cardiology Clinic for follow up with complaints of retaining fluid.   The patient has a past medical history significant for hypertension, dyslipidemia, ARIELLA, and anemia.  His recent medical history is significant for admission for volume overload at which time he was found to have acute systolic heart failure with an LVEF 30-35%.  He was also found to have severe one-vessel coronary artery disease, recently undergoing PCI with EDGARDO x 2 to the mid LAD.  He is followed by Nephrology for CKD as well.  He contacted Nephrology prior to this appointment and increased Torsemide to BID dosing for 3 days with improvement in symptoms.  No leg swelling today and resolution of shortness of breath.  He denies any chest pain or any other issues.      Past Cardiac Testin. Last Coronary Angio: 3/5/2025              1.  Successful lithotripsy and PCI of the mid LAD with placement of overlapping EDGARDO  2. Prior Stress Testing: None  3. Last Echo: 2025           1.  Mild left ventricular dilation with moderately reduced LV systolic function, LVEF 30-35%.  2.  Moderate global LV hypokinesis.  3.  Grade 2 diastolic dysfunction.  4.  Normal right ventricular size and systolic function by TAPSE.  5.  Severely increased left atrial volume index.  6.  Mild tricuspid regurgitation.  7.  Left pleural effusion.  4. Prior Holter Monitor: None    Review of Systems   Constitutional:  Negative for activity change and fatigue.   Respiratory:  Positive for shortness of breath. Negative for chest tightness.    Cardiovascular:  Positive for leg swelling. Negative for chest pain and palpitations.   Neurological:  Negative for dizziness.   All  other systems reviewed and are negative.       Past Medical History:   Diagnosis Date    Acute systolic heart failure 2025    Diabetes mellitus     Hypertension     Sleep apnea     CPAP COMPLIANT       Past Surgical History:   Procedure Laterality Date    CARDIAC CATHETERIZATION N/A 2025    Procedure: Left Heart Cath;  Surgeon: Benjamin Le MD;  Location:  [facility];  Service: Cardiovascular;  Laterality: N/A;    CARDIAC CATHETERIZATION N/A 3/5/2025    Procedure: Optical Coherence Tomography;  Surgeon: Denis Garrett IV, MD;  Location:  [facility];  Service: Cardiovascular;  Laterality: N/A;    CARDIAC CATHETERIZATION N/A 3/5/2025    Procedure: Stent EDGARDO coronary;  Surgeon: Denis Garrett IV, MD;  Location:  [facility];  Service: Cardiovascular;  Laterality: N/A;    COLONOSCOPY         Family History   Problem Relation Age of Onset    Cancer Father        Social History     Socioeconomic History    Marital status:    Tobacco Use    Smoking status: Former     Current packs/day: 0.00     Types: Cigarettes     Quit date: 1965     Years since quittin.3     Passive exposure: Never    Smokeless tobacco: Never   Vaping Use    Vaping status: Never Used   Substance and Sexual Activity    Alcohol use: No    Drug use: No    Sexual activity: Defer         Current Outpatient Medications:     ASPIRIN 81 PO, Take 81 mg by mouth Every Night., Disp: , Rfl:     carvedilol (COREG) 6.25 MG tablet, Take 1 tablet by mouth 2 (Two) Times a Day With Meals., Disp: 180 tablet, Rfl: 1    clopidogrel (PLAVIX) 75 MG tablet, Take 1 tablet by mouth once daily, Disp: 30 tablet, Rfl: 3    cyanocobalamin 1000 MCG/ML injection, INJECT 1 ML AS DIRECTED ONCE EVERY MONTH, Disp: , Rfl:     empagliflozin (Jardiance) 10 MG tablet tablet, Take 1 tablet by mouth Daily., Disp: 90 tablet, Rfl: 1    metFORMIN (GLUCOPHAGE) 500 MG tablet, Take 1 tablet by mouth  "2 (Two) Times a Day With Meals., Disp: , Rfl:     potassium chloride (KLOR-CON M20) 20 MEQ CR tablet, Take 1 tablet by mouth., Disp: , Rfl:     rosuvastatin (CRESTOR) 10 MG tablet, Take 1 tablet by mouth Every Night for 90 days., Disp: 90 tablet, Rfl: 0    sacubitril-valsartan (ENTRESTO) 49-51 MG tablet, Take 1 tablet by mouth 2 (Two) Times a Day., Disp: 180 tablet, Rfl: 1    torsemide (DEMADEX) 20 MG tablet, Take 1 tablet by mouth Daily., Disp: , Rfl:     vitamin D3 125 MCG (5000 UT) capsule capsule, Take 1 capsule by mouth Daily., Disp: , Rfl:     nitroglycerin (NITROSTAT) 0.4 MG SL tablet, Place 1 tablet under the tongue Every 5 (Five) Minutes As Needed for Chest Pain for up to 10 days. Take no more than 3 doses in 15 minutes., Disp: 25 tablet, Rfl: 0      Allergies   Allergen Reactions    Atorvastatin Other (See Comments)     MEMORY ISSUES    Xanax [Alprazolam] Anxiety       Objective     Vitals:    04/21/25 1520   BP: 122/60   BP Location: Left arm   Patient Position: Sitting   Cuff Size: Adult   Pulse: 60   SpO2: 91%   Weight: 76.2 kg (168 lb)   Height: 172.7 cm (68\")     Body mass index is 25.54 kg/m².    Physical Exam  Vitals and nursing note reviewed.   Constitutional:       General: He is not in acute distress.     Appearance: Normal appearance. He is not toxic-appearing.   HENT:      Head: Normocephalic.      Mouth/Throat:      Mouth: Mucous membranes are moist.   Eyes:      Pupils: Pupils are equal, round, and reactive to light.   Cardiovascular:      Rate and Rhythm: Normal rate and regular rhythm.      Pulses: Normal pulses.      Heart sounds: Normal heart sounds. No murmur heard.  Pulmonary:      Effort: Pulmonary effort is normal.      Breath sounds: Normal breath sounds. No wheezing, rhonchi or rales.   Musculoskeletal:      Right lower leg: No edema.      Left lower leg: No edema.   Skin:     General: Skin is warm and dry.      Capillary Refill: Capillary refill takes less than 2 seconds. "   Neurological:      Mental Status: He is alert and oriented to person, place, and time. Mental status is at baseline.   Psychiatric:         Mood and Affect: Mood normal.         Behavior: Behavior normal.         Thought Content: Thought content normal.         Results Review:   I reviewed the patient's new clinical results.    Procedures    Assessment & Plan  Coronary artery disease involving native coronary artery of native heart with angina pectoris  -S/P shockwave lithotripsy and PCI with EDGARDO x 2 to the mid LAD 2/2025  -No current anginal symptoms  -Continue DAPT with ASA and Plavix  -Continue statin therapy  -Keep f/u appointment with Dr. Myles in [place]         Acute on chronic systolic CHF (congestive heart failure)  -LVEF 30-35% 3/2025  -Euvolemic on exam today after diuretic adjustment by Nephrology PTA  -Continue Torsemide--diuretics managed by Nephrology  -Continue Carvedilol and Entresto  -Intolerance to Farxiga         Stage 3 chronic kidney disease, unspecified whether stage 3a or 3b CKD  -Creatinine 1.33 3/2025  -Followed by Nephrology         Hyperlipidemia LDL goal <70  -Lipid panel 3/2025 noted LDL-74  -Continue statin therapy           Preventative Cardiology:   Tobacco Cessation: N/A   Advance Care Planning: ACP discussion was held with the patient during this visit. Patient does not have an advance directive, declines further assistance.     Follow Up:   Return for Next scheduled follow up.      Thank you for allowing me to participate in the care of your patient. Please to not hesitate to contact me with additional questions or concerns.     Martha Davidson APRN

## 2025-04-17 NOTE — PROGRESS NOTES
Pt was seen today in CR for a Phase 2 visit.  Vital signs and session notes recorded in [employer] and will be scanned into Epic by HIM.

## 2025-04-21 ENCOUNTER — OFFICE VISIT (OUTPATIENT)
Dept: CARDIOLOGY | Facility: CLINIC | Age: 85
End: 2025-04-21
Payer: MEDICARE

## 2025-04-21 VITALS
HEIGHT: 68 IN | DIASTOLIC BLOOD PRESSURE: 60 MMHG | BODY MASS INDEX: 25.46 KG/M2 | WEIGHT: 168 LBS | SYSTOLIC BLOOD PRESSURE: 122 MMHG | OXYGEN SATURATION: 91 % | HEART RATE: 60 BPM

## 2025-04-21 DIAGNOSIS — E78.5 HYPERLIPIDEMIA LDL GOAL <70: ICD-10-CM

## 2025-04-21 DIAGNOSIS — I25.119 CORONARY ARTERY DISEASE INVOLVING NATIVE CORONARY ARTERY OF NATIVE HEART WITH ANGINA PECTORIS: Primary | ICD-10-CM

## 2025-04-21 DIAGNOSIS — I50.23 ACUTE ON CHRONIC SYSTOLIC CHF (CONGESTIVE HEART FAILURE): ICD-10-CM

## 2025-04-21 DIAGNOSIS — N18.30 STAGE 3 CHRONIC KIDNEY DISEASE, UNSPECIFIED WHETHER STAGE 3A OR 3B CKD: ICD-10-CM

## 2025-04-21 PROCEDURE — G2211 COMPLEX E/M VISIT ADD ON: HCPCS | Performed by: NURSE PRACTITIONER

## 2025-04-21 PROCEDURE — 1159F MED LIST DOCD IN RCRD: CPT | Performed by: NURSE PRACTITIONER

## 2025-04-21 PROCEDURE — 99214 OFFICE O/P EST MOD 30 MIN: CPT | Performed by: NURSE PRACTITIONER

## 2025-04-21 PROCEDURE — 1160F RVW MEDS BY RX/DR IN RCRD: CPT | Performed by: NURSE PRACTITIONER

## 2025-04-21 RX ORDER — POTASSIUM CHLORIDE 1500 MG/1
20 TABLET, EXTENDED RELEASE ORAL
COMMUNITY
Start: 2025-03-14 | End: 2026-03-14

## 2025-04-21 RX ORDER — TORSEMIDE 20 MG/1
20 TABLET ORAL DAILY
COMMUNITY
Start: 2025-03-14

## 2025-04-21 RX ORDER — CYANOCOBALAMIN 1000 UG/ML
INJECTION, SOLUTION INTRAMUSCULAR; SUBCUTANEOUS
COMMUNITY
Start: 2025-04-15

## 2025-04-21 NOTE — ASSESSMENT & PLAN NOTE
-S/P shockwave lithotripsy and PCI with EDGARDO x 2 to the mid LAD 2/2025  -No current anginal symptoms  -Continue DAPT with ASA and Plavix  -Continue statin therapy  -Keep f/u appointment with Dr. Myles in [place]

## 2025-04-22 ENCOUNTER — TREATMENT (OUTPATIENT)
Dept: CARDIAC REHAB | Facility: HOSPITAL | Age: 85
End: 2025-04-22
Payer: MEDICARE

## 2025-04-22 DIAGNOSIS — Z95.5 STATUS POST CORONARY ARTERY STENT PLACEMENT: Primary | ICD-10-CM

## 2025-04-22 PROCEDURE — 93798 PHYS/QHP OP CAR RHAB W/ECG: CPT

## 2025-04-22 NOTE — PROGRESS NOTES
Pt was seen today in CR for a Phase 2 visit.  Vital signs and session notes recorded in [employer] and will be scanned into Epic by HIM.

## 2025-04-24 ENCOUNTER — TREATMENT (OUTPATIENT)
Dept: CARDIAC REHAB | Facility: HOSPITAL | Age: 85
End: 2025-04-24
Payer: MEDICARE

## 2025-04-24 DIAGNOSIS — Z95.5 STATUS POST CORONARY ARTERY STENT PLACEMENT: Primary | ICD-10-CM

## 2025-04-24 PROCEDURE — 93798 PHYS/QHP OP CAR RHAB W/ECG: CPT

## 2025-04-24 NOTE — PROGRESS NOTES
Pt was seen today in CR for a Phase 2 visit.  Vital signs and session notes recorded in [employer] and will be scanned into Epic by HIM.

## 2025-04-29 ENCOUNTER — TREATMENT (OUTPATIENT)
Dept: CARDIAC REHAB | Facility: HOSPITAL | Age: 85
End: 2025-04-29
Payer: MEDICARE

## 2025-04-29 DIAGNOSIS — Z95.5 STATUS POST CORONARY ARTERY STENT PLACEMENT: Primary | ICD-10-CM

## 2025-04-29 PROCEDURE — 93798 PHYS/QHP OP CAR RHAB W/ECG: CPT

## 2025-04-29 NOTE — PROGRESS NOTES
Pt was seen today in CR for a Phase 2 visit.  Vital signs and session notes recorded in [employer] and will be scanned into Epic by HIM.

## 2025-05-01 ENCOUNTER — APPOINTMENT (OUTPATIENT)
Dept: CARDIAC REHAB | Facility: HOSPITAL | Age: 85
End: 2025-05-01
Payer: MEDICARE

## 2025-05-01 ENCOUNTER — TELEPHONE (OUTPATIENT)
Dept: CARDIOLOGY | Facility: CLINIC | Age: 85
End: 2025-05-01
Payer: MEDICARE

## 2025-05-01 NOTE — TELEPHONE ENCOUNTER
It looks like the most recent note from Dr. Myles reflects that Jardiance is being held for possible intolerance.       SENDING TO DR. MYLES FOR CLARIFICATION.

## 2025-05-06 ENCOUNTER — TREATMENT (OUTPATIENT)
Dept: CARDIAC REHAB | Facility: HOSPITAL | Age: 85
End: 2025-05-06
Payer: MEDICARE

## 2025-05-06 DIAGNOSIS — Z95.5 STATUS POST CORONARY ARTERY STENT PLACEMENT: Primary | ICD-10-CM

## 2025-05-06 PROCEDURE — 93798 PHYS/QHP OP CAR RHAB W/ECG: CPT

## 2025-05-06 NOTE — PROGRESS NOTES
Pt was seen today in CR for a Phase 2 visit.  Vital signs and session notes recorded in [employer] and will be scanned into Epic by HIM.

## 2025-05-08 ENCOUNTER — TREATMENT (OUTPATIENT)
Dept: CARDIAC REHAB | Facility: HOSPITAL | Age: 85
End: 2025-05-08
Payer: MEDICARE

## 2025-05-08 DIAGNOSIS — Z95.5 STATUS POST CORONARY ARTERY STENT PLACEMENT: Primary | ICD-10-CM

## 2025-05-08 PROCEDURE — 93798 PHYS/QHP OP CAR RHAB W/ECG: CPT

## 2025-05-08 NOTE — PROGRESS NOTES
Pt was seen today in CR for a Phase 2 visit.  Vital signs and session notes recorded in [employer] and will be scanned into Epic by HIM.  
Sj Torres)  Pediatrics  [address] [place] [address]  Phone: [phone]  Fax: [phone]  Follow Up Time: 1-3 days

## 2025-05-13 ENCOUNTER — TREATMENT (OUTPATIENT)
Dept: CARDIAC REHAB | Facility: HOSPITAL | Age: 85
End: 2025-05-13
Payer: MEDICARE

## 2025-05-13 DIAGNOSIS — Z95.5 STATUS POST CORONARY ARTERY STENT PLACEMENT: Primary | ICD-10-CM

## 2025-05-13 PROCEDURE — 93798 PHYS/QHP OP CAR RHAB W/ECG: CPT

## 2025-05-13 NOTE — PROGRESS NOTES
Pt was seen today in CR for a Phase 2 visit.  Vital signs and session notes recorded in [employer] and will be scanned into Epic by HIM.

## 2025-05-15 ENCOUNTER — TREATMENT (OUTPATIENT)
Dept: CARDIAC REHAB | Facility: HOSPITAL | Age: 85
End: 2025-05-15
Payer: MEDICARE

## 2025-05-15 DIAGNOSIS — Z95.5 STATUS POST CORONARY ARTERY STENT PLACEMENT: Primary | ICD-10-CM

## 2025-05-15 PROCEDURE — 93798 PHYS/QHP OP CAR RHAB W/ECG: CPT

## 2025-05-20 ENCOUNTER — TREATMENT (OUTPATIENT)
Dept: CARDIAC REHAB | Facility: HOSPITAL | Age: 85
End: 2025-05-20
Payer: MEDICARE

## 2025-05-20 DIAGNOSIS — Z95.5 STATUS POST CORONARY ARTERY STENT PLACEMENT: Primary | ICD-10-CM

## 2025-05-20 PROCEDURE — 93798 PHYS/QHP OP CAR RHAB W/ECG: CPT

## 2025-05-20 NOTE — PROGRESS NOTES
Pt was seen today in CR for a Phase II visit. Vital signs and session notes recorded in [employer] and will be scanned into Epic by HIM.

## 2025-05-22 ENCOUNTER — TREATMENT (OUTPATIENT)
Dept: CARDIAC REHAB | Facility: HOSPITAL | Age: 85
End: 2025-05-22
Payer: MEDICARE

## 2025-05-22 DIAGNOSIS — Z95.5 STATUS POST CORONARY ARTERY STENT PLACEMENT: Primary | ICD-10-CM

## 2025-05-22 PROCEDURE — 93798 PHYS/QHP OP CAR RHAB W/ECG: CPT

## 2025-05-22 NOTE — PROGRESS NOTES
Pt was seen today in CR for a Phase II visit. Vital signs and session notes recorded in [employer] and will be scanned into Epic by HIM.

## 2025-05-27 ENCOUNTER — APPOINTMENT (OUTPATIENT)
Dept: CARDIAC REHAB | Facility: HOSPITAL | Age: 85
End: 2025-05-27
Payer: MEDICARE

## 2025-05-29 ENCOUNTER — TREATMENT (OUTPATIENT)
Dept: CARDIAC REHAB | Facility: HOSPITAL | Age: 85
End: 2025-05-29
Payer: MEDICARE

## 2025-05-29 DIAGNOSIS — Z95.5 STATUS POST CORONARY ARTERY STENT PLACEMENT: Primary | ICD-10-CM

## 2025-05-29 PROCEDURE — 93798 PHYS/QHP OP CAR RHAB W/ECG: CPT

## 2025-05-29 NOTE — PROGRESS NOTES
Pt was seen today in CR for a Phase 2 visit.  Vital signs and session notes recorded in [employer] and will be scanned into Epic by HIM.

## 2025-06-03 ENCOUNTER — TREATMENT (OUTPATIENT)
Dept: CARDIAC REHAB | Facility: HOSPITAL | Age: 85
End: 2025-06-03
Payer: MEDICARE

## 2025-06-03 DIAGNOSIS — Z95.5 STATUS POST CORONARY ARTERY STENT PLACEMENT: Primary | ICD-10-CM

## 2025-06-03 PROCEDURE — 93798 PHYS/QHP OP CAR RHAB W/ECG: CPT

## 2025-06-03 NOTE — PROGRESS NOTES
Pt was seen today in CR for a Phase 2 visit.  Vital signs and session notes recorded in [employer] and will be scanned into Epic by HIM.

## 2025-06-05 ENCOUNTER — TREATMENT (OUTPATIENT)
Dept: CARDIAC REHAB | Facility: HOSPITAL | Age: 85
End: 2025-06-05
Payer: MEDICARE

## 2025-06-05 DIAGNOSIS — Z95.5 STATUS POST CORONARY ARTERY STENT PLACEMENT: Primary | ICD-10-CM

## 2025-06-05 PROCEDURE — 93798 PHYS/QHP OP CAR RHAB W/ECG: CPT

## 2025-06-05 NOTE — PROGRESS NOTES
Pt was seen today in CR for a Phase II visit. Vital signs and session notes recorded in [employer] and will be scanned into Epic by HIM.

## 2025-06-10 ENCOUNTER — TREATMENT (OUTPATIENT)
Dept: CARDIAC REHAB | Facility: HOSPITAL | Age: 85
End: 2025-06-10
Payer: MEDICARE

## 2025-06-10 DIAGNOSIS — Z95.5 STATUS POST CORONARY ARTERY STENT PLACEMENT: Primary | ICD-10-CM

## 2025-06-10 PROCEDURE — 93798 PHYS/QHP OP CAR RHAB W/ECG: CPT

## 2025-06-10 NOTE — PROGRESS NOTES
Pt was seen today in CR for a Phase 2 visit.  Vital signs and session notes recorded in [employer] and will be scanned into Epic by HIM.

## 2025-06-12 ENCOUNTER — TREATMENT (OUTPATIENT)
Dept: CARDIAC REHAB | Facility: HOSPITAL | Age: 85
End: 2025-06-12
Payer: MEDICARE

## 2025-06-12 DIAGNOSIS — Z95.5 STATUS POST CORONARY ARTERY STENT PLACEMENT: Primary | ICD-10-CM

## 2025-06-12 PROCEDURE — 93798 PHYS/QHP OP CAR RHAB W/ECG: CPT

## 2025-06-12 NOTE — PROGRESS NOTES
Pt was seen today in CR for a Phase 2 visit.  Vital signs and session notes recorded in [employer] and will be scanned into Epic by HIM.

## 2025-06-17 ENCOUNTER — TELEPHONE (OUTPATIENT)
Dept: CARDIOLOGY | Facility: CLINIC | Age: 85
End: 2025-06-17

## 2025-06-17 ENCOUNTER — TREATMENT (OUTPATIENT)
Dept: CARDIAC REHAB | Facility: HOSPITAL | Age: 85
End: 2025-06-17
Payer: MEDICARE

## 2025-06-17 ENCOUNTER — OFFICE VISIT (OUTPATIENT)
Dept: CARDIOLOGY | Facility: CLINIC | Age: 85
End: 2025-06-17
Payer: MEDICARE

## 2025-06-17 VITALS
HEIGHT: 68 IN | WEIGHT: 165 LBS | RESPIRATION RATE: 20 BRPM | HEART RATE: 68 BPM | DIASTOLIC BLOOD PRESSURE: 68 MMHG | SYSTOLIC BLOOD PRESSURE: 188 MMHG | BODY MASS INDEX: 25.01 KG/M2 | OXYGEN SATURATION: 97 %

## 2025-06-17 DIAGNOSIS — N18.30 STAGE 3 CHRONIC KIDNEY DISEASE, UNSPECIFIED WHETHER STAGE 3A OR 3B CKD: ICD-10-CM

## 2025-06-17 DIAGNOSIS — I50.22 CHRONIC SYSTOLIC CONGESTIVE HEART FAILURE: Primary | ICD-10-CM

## 2025-06-17 DIAGNOSIS — Z95.5 STATUS POST CORONARY ARTERY STENT PLACEMENT: Primary | ICD-10-CM

## 2025-06-17 DIAGNOSIS — I25.10 CORONARY ARTERY DISEASE INVOLVING NATIVE CORONARY ARTERY OF NATIVE HEART, UNSPECIFIED WHETHER ANGINA PRESENT: ICD-10-CM

## 2025-06-17 PROCEDURE — 99213 OFFICE O/P EST LOW 20 MIN: CPT | Performed by: INTERNAL MEDICINE

## 2025-06-17 PROCEDURE — 93798 PHYS/QHP OP CAR RHAB W/ECG: CPT

## 2025-06-17 NOTE — PROGRESS NOTES
[facility] Cardiology Office Follow Up Note    Abebe Crockett  [id number]  2025    Primary Care Provider: Dre Henry MD    Chief Complaint: Routine follow-up    History of Present Illness:   Mr. Abebe Crockett is a  85y.o. male who presents to the Cardiology Clinic for routine follow-up.  The patient has a past medical history significant for hypertension, dyslipidemia, stage III CKD, ARIELLA, and anemia.  He has a past cardiac history significant for coronary artery disease with prior PCI with EDGARDO x 2 to the mid LAD.  He also has a history of ischemic cardiomyopathy with an LVEF 30-35%.  Currently, the patient reports he is doing well from a cardiac standpoint.  He has not had any chest pain or chest discomfort.  He currently denies exertional dyspnea.  No recent orthopnea or PND.  With his current torsemide dosing, his volume status appears to be stable.  He is hypertensive today, however reports his systolic BP is typically 120s-130s on home BP checks.  No other new complaints.    Past Cardiac Testin. Last Coronary Angio: 3/5/2025              1.  Successful lithotripsy and PCI of the mid LAD with placement of overlapping EDGARDO  2. Prior Stress Testing: None  3. Last Echo: 2025           1.  Mild left ventricular dilation with moderately reduced LV systolic function, LVEF 30-35%.  2.  Moderate global LV hypokinesis.  3.  Grade 2 diastolic dysfunction.  4.  Normal right ventricular size and systolic function by TAPSE.  5.  Severely increased left atrial volume index.  6.  Mild tricuspid regurgitation.  7.  Left pleural effusion.  4. Prior Holter Monitor: None    Review of Systems:   Review of Systems   Constitutional:  Negative for activity change, appetite change, chills, diaphoresis, fatigue, fever, unexpected weight gain and unexpected weight loss.   Eyes:  Negative for blurred vision and double vision.   Respiratory:  Negative for cough, chest tightness, shortness of  breath and wheezing.    Cardiovascular:  Negative for chest pain, palpitations and leg swelling.   Gastrointestinal:  Negative for abdominal pain, anal bleeding, blood in stool and GERD.   Endocrine: Negative for cold intolerance and heat intolerance.   Genitourinary:  Negative for hematuria.   Neurological:  Negative for dizziness, syncope, weakness and light-headedness.   Hematological:  Does not bruise/bleed easily.   Psychiatric/Behavioral:  Negative for depressed mood and stress. The patient is not nervous/anxious.        I have reviewed and/or updated the patient's past medical, past surgical, family, social history, problem list and allergies as appropriate.     Medications:     Current Outpatient Medications:     ASPIRIN 81 PO, Take 81 mg by mouth Every Night., Disp: , Rfl:     carvedilol (COREG) 6.25 MG tablet, Take 1 tablet by mouth 2 (Two) Times a Day With Meals., Disp: 180 tablet, Rfl: 1    clopidogrel (PLAVIX) 75 MG tablet, Take 1 tablet by mouth once daily, Disp: 30 tablet, Rfl: 3    cyanocobalamin 1000 MCG/ML injection, INJECT 1 ML AS DIRECTED ONCE EVERY MONTH, Disp: , Rfl:     empagliflozin (Jardiance) 10 MG tablet tablet, Take 1 tablet by mouth Daily., Disp: 90 tablet, Rfl: 1    potassium chloride (KLOR-CON M20) 20 MEQ CR tablet, Take 1 tablet by mouth., Disp: , Rfl:     sacubitril-valsartan (ENTRESTO) 49-51 MG tablet, Take 1 tablet by mouth 2 (Two) Times a Day., Disp: 180 tablet, Rfl: 1    torsemide (DEMADEX) 20 MG tablet, Take 1 tablet by mouth Daily., Disp: , Rfl:     vitamin D3 125 MCG (5000 UT) capsule capsule, Take 1 capsule by mouth Daily., Disp: , Rfl:     metFORMIN (GLUCOPHAGE) 500 MG tablet, Take 1 tablet by mouth 2 (Two) Times a Day With Meals., Disp: , Rfl:     nitroglycerin (NITROSTAT) 0.4 MG SL tablet, Place 1 tablet under the tongue Every 5 (Five) Minutes As Needed for Chest Pain for up to 10 days. Take no more than 3 doses in 15 minutes., Disp: 25 tablet, Rfl: 0    rosuvastatin  "(CRESTOR) 10 MG tablet, Take 1 tablet by mouth Every Night for 90 days., Disp: 90 tablet, Rfl: 0    Physical Exam:  Vital Signs:   Vitals:    06/17/25 1513   BP: (!) 188/68   BP Location: Right arm   Patient Position: Sitting   Cuff Size: Adult   Pulse: 68   Resp: 20   SpO2: 97%   Weight: 74.8 kg (165 lb)   Height: 172.7 cm (67.99\")       Physical Exam  Constitutional:       General: He is not in acute distress.     Appearance: Normal appearance. He is not diaphoretic.   HENT:      Head: Normocephalic and atraumatic.   Cardiovascular:      Rate and Rhythm: Normal rate and regular rhythm.      Heart sounds: No murmur heard.  Pulmonary:      Effort: Pulmonary effort is normal. No respiratory distress.      Breath sounds: Normal breath sounds. No stridor. No wheezing, rhonchi or rales.   Abdominal:      General: Bowel sounds are normal. There is no distension.      Palpations: Abdomen is soft.      Tenderness: There is no abdominal tenderness. There is no guarding or rebound.   Musculoskeletal:         General: No swelling. Normal range of motion.      Cervical back: Neck supple. No tenderness.   Skin:     General: Skin is warm and dry.   Neurological:      General: No focal deficit present.      Mental Status: He is alert and oriented to person, place, and time.   Psychiatric:         Mood and Affect: Mood normal.         Behavior: Behavior normal.         Results Review:   I reviewed the patient's new clinical results.        Assessment / Plan:     1.  Coronary artery disease  -- Now status post shockwave lithotripsy and PCI with EDGARDO x 2 to the mid LAD  -- No recurrent anginal symptoms  -- Continue DAPT with aspirin and Plavix through 3/26, then de-escalate to aspirin monotherapy  -- Continue Crestor     2.  Chronic systolic heart failure  -- LVEF 30-35% on last assessment  -- Euvolemic on exam today  -- Torsemide currently being dosed by nephrology  -- Continue carvedilol Entresto, Jardiance  -- Repeat limited " echocardiogram to reassess LVEF, if LVEF less than 35% will consider referral for ICD     3.  Stage III CKD  -- Renal function stable    4.  Hypertension  -- Hypertensive today, suspect a possible component of whitecoat hypertension  -- Systolic BP appears to be 120s-130s on home BP checks  -- If systolic BP maintaining 140 mmHg or greater, will uptitrate carvedilol      Follow Up:   Return in about 6 months (around 12/17/2025).      Thank you for allowing me to participate in the care of your patient. Please to not hesitate to contact me with additional questions or concerns.     FLORES Myles MD  Interventional Cardiology   06/17/2025  15:16 EDT

## 2025-06-17 NOTE — TELEPHONE ENCOUNTER
Caller: RENATO BASS    Relationship: [employer]    Best call back number: [phone] (home)     What is the best time to reach you: ANYTIME    Who are you requesting to speak with (clinical staff, provider,  specific staff member): ANYONE    What was the call regarding: PT MISSED CALL. NO NOTES IN CHART. PLEASE CALL PT WHEN AVAILABLE.

## 2025-06-17 NOTE — PROGRESS NOTES
Patient was seen today in cardiac rehab for a phase II visit. Vital signs and session notes [facility] and will be scanned into Epic by HIM.

## 2025-06-19 ENCOUNTER — TREATMENT (OUTPATIENT)
Dept: CARDIAC REHAB | Facility: HOSPITAL | Age: 85
End: 2025-06-19
Payer: MEDICARE

## 2025-06-19 DIAGNOSIS — Z95.5 STATUS POST CORONARY ARTERY STENT PLACEMENT: Primary | ICD-10-CM

## 2025-06-19 PROCEDURE — 93798 PHYS/QHP OP CAR RHAB W/ECG: CPT

## 2025-06-19 NOTE — PROGRESS NOTES
Pt was seen today in CR for a Phase 2 visit. Vitals signs and session notes recorded in [employer] and will be scanned into Epic by HIM.

## 2025-06-23 ENCOUNTER — TRANSCRIBE ORDERS (OUTPATIENT)
Dept: LAB | Facility: HOSPITAL | Age: 85
End: 2025-06-23
Payer: MEDICARE

## 2025-06-23 ENCOUNTER — LAB (OUTPATIENT)
Dept: LAB | Facility: HOSPITAL | Age: 85
End: 2025-06-23
Payer: MEDICARE

## 2025-06-23 DIAGNOSIS — N18.31 CHRONIC KIDNEY DISEASE (CKD) STAGE G3A/A1, MODERATELY DECREASED GLOMERULAR FILTRATION RATE (GFR) BETWEEN 45-59 ML/MIN/1.73 SQUARE METER AND ALBUMINURIA CREATININE RATIO LESS THAN 30 MG/G (CMS/H*: ICD-10-CM

## 2025-06-23 DIAGNOSIS — N18.31 CHRONIC KIDNEY DISEASE (CKD) STAGE G3A/A1, MODERATELY DECREASED GLOMERULAR FILTRATION RATE (GFR) BETWEEN 45-59 ML/MIN/1.73 SQUARE METER AND ALBUMINURIA CREATININE RATIO LESS THAN 30 MG/G (CMS/H*: Primary | ICD-10-CM

## 2025-06-23 LAB
ALBUMIN SERPL-MCNC: 3.4 G/DL (ref 3.5–5.2)
ALBUMIN/GLOB SERPL: 1.2 G/DL
ALP SERPL-CCNC: 57 U/L (ref 39–117)
ALT SERPL W P-5'-P-CCNC: 5 U/L (ref 1–41)
ANION GAP SERPL CALCULATED.3IONS-SCNC: 11 MMOL/L (ref 5–15)
AST SERPL-CCNC: 16 U/L (ref 1–40)
BASOPHILS # BLD AUTO: 0.04 10*3/MM3 (ref 0–0.2)
BASOPHILS NFR BLD AUTO: 0.7 % (ref 0–1.5)
BILIRUB SERPL-MCNC: 0.4 MG/DL (ref 0–1.2)
BUN SERPL-MCNC: 24 MG/DL (ref 8–23)
BUN/CREAT SERPL: 15.4 (ref 7–25)
CALCIUM SPEC-SCNC: 9.1 MG/DL (ref 8.6–10.5)
CHLORIDE SERPL-SCNC: 99 MMOL/L (ref 98–107)
CO2 SERPL-SCNC: 27 MMOL/L (ref 22–29)
CREAT SERPL-MCNC: 1.56 MG/DL (ref 0.76–1.27)
DEPRECATED RDW RBC AUTO: 42.1 FL (ref 37–54)
EGFRCR SERPLBLD CKD-EPI 2021: 43.8 ML/MIN/1.73
EOSINOPHIL # BLD AUTO: 0.11 10*3/MM3 (ref 0–0.4)
EOSINOPHIL NFR BLD AUTO: 1.8 % (ref 0.3–6.2)
ERYTHROCYTE [DISTWIDTH] IN BLOOD BY AUTOMATED COUNT: 12.5 % (ref 12.3–15.4)
GLOBULIN UR ELPH-MCNC: 2.8 GM/DL
GLUCOSE SERPL-MCNC: 188 MG/DL (ref 65–99)
HCT VFR BLD AUTO: 34.6 % (ref 37.5–51)
HGB BLD-MCNC: 11.3 G/DL (ref 13–17.7)
IMM GRANULOCYTES # BLD AUTO: 0.01 10*3/MM3 (ref 0–0.05)
IMM GRANULOCYTES NFR BLD AUTO: 0.2 % (ref 0–0.5)
LYMPHOCYTES # BLD AUTO: 0.77 10*3/MM3 (ref 0.7–3.1)
LYMPHOCYTES NFR BLD AUTO: 12.8 % (ref 19.6–45.3)
MAGNESIUM SERPL-MCNC: 2.2 MG/DL (ref 1.6–2.4)
MCH RBC QN AUTO: 30.6 PG (ref 26.6–33)
MCHC RBC AUTO-ENTMCNC: 32.7 G/DL (ref 31.5–35.7)
MCV RBC AUTO: 93.8 FL (ref 79–97)
MONOCYTES # BLD AUTO: 0.53 10*3/MM3 (ref 0.1–0.9)
MONOCYTES NFR BLD AUTO: 8.8 % (ref 5–12)
NEUTROPHILS NFR BLD AUTO: 4.57 10*3/MM3 (ref 1.7–7)
NEUTROPHILS NFR BLD AUTO: 75.7 % (ref 42.7–76)
NRBC BLD AUTO-RTO: 0 /100 WBC (ref 0–0.2)
PLATELET # BLD AUTO: 177 10*3/MM3 (ref 140–450)
PMV BLD AUTO: 10.4 FL (ref 6–12)
POTASSIUM SERPL-SCNC: 4.6 MMOL/L (ref 3.5–5.2)
PROT SERPL-MCNC: 6.2 G/DL (ref 6–8.5)
RBC # BLD AUTO: 3.69 10*6/MM3 (ref 4.14–5.8)
SODIUM SERPL-SCNC: 137 MMOL/L (ref 136–145)
WBC NRBC COR # BLD AUTO: 6.03 10*3/MM3 (ref 3.4–10.8)

## 2025-06-23 PROCEDURE — 36415 COLL VENOUS BLD VENIPUNCTURE: CPT

## 2025-06-23 PROCEDURE — 83735 ASSAY OF MAGNESIUM: CPT

## 2025-06-23 PROCEDURE — 80053 COMPREHEN METABOLIC PANEL: CPT

## 2025-06-23 PROCEDURE — 85025 COMPLETE CBC W/AUTO DIFF WBC: CPT

## 2025-06-24 ENCOUNTER — TREATMENT (OUTPATIENT)
Dept: CARDIAC REHAB | Facility: HOSPITAL | Age: 85
End: 2025-06-24
Payer: MEDICARE

## 2025-06-24 DIAGNOSIS — Z95.5 STATUS POST CORONARY ARTERY STENT PLACEMENT: Primary | ICD-10-CM

## 2025-06-24 PROCEDURE — 93798 PHYS/QHP OP CAR RHAB W/ECG: CPT

## 2025-06-24 NOTE — PROGRESS NOTES
Pt was seen today in CR for a Phase II visit. Vital signs and session notes recorded in [employer] and will be scanned into Epic by HIM.

## 2025-06-26 ENCOUNTER — TREATMENT (OUTPATIENT)
Dept: CARDIAC REHAB | Facility: HOSPITAL | Age: 85
End: 2025-06-26
Payer: MEDICARE

## 2025-06-26 DIAGNOSIS — Z95.5 STATUS POST CORONARY ARTERY STENT PLACEMENT: Primary | ICD-10-CM

## 2025-06-26 PROCEDURE — 93798 PHYS/QHP OP CAR RHAB W/ECG: CPT

## 2025-06-26 NOTE — PROGRESS NOTES
Pt was seen today in CR for a Phase 2 visit.  Vital signs and session notes recorded in [employer] and will be scanned into Epic by HIM.

## 2025-07-01 ENCOUNTER — TREATMENT (OUTPATIENT)
Dept: CARDIAC REHAB | Facility: HOSPITAL | Age: 85
End: 2025-07-01
Payer: MEDICARE

## 2025-07-01 DIAGNOSIS — Z95.5 STATUS POST CORONARY ARTERY STENT PLACEMENT: Primary | ICD-10-CM

## 2025-07-01 PROCEDURE — 93798 PHYS/QHP OP CAR RHAB W/ECG: CPT

## 2025-07-01 NOTE — PROGRESS NOTES
Pt was seen today in CR for a Phase 2 visit.  Vital signs and session notes recorded in [employer] and will be scanned into Epic by HIM.

## 2025-07-03 ENCOUNTER — TREATMENT (OUTPATIENT)
Dept: CARDIAC REHAB | Facility: HOSPITAL | Age: 85
End: 2025-07-03
Payer: MEDICARE

## 2025-07-03 DIAGNOSIS — Z95.5 STATUS POST CORONARY ARTERY STENT PLACEMENT: Primary | ICD-10-CM

## 2025-07-03 PROCEDURE — 93798 PHYS/QHP OP CAR RHAB W/ECG: CPT

## 2025-07-03 NOTE — PROGRESS NOTES
Patient was seen in Cardiac Rehab for Phase II visit. Vital signs and session notes recorded in [employer] and will be scanned into Epic by HIM.

## 2025-07-08 ENCOUNTER — TELEPHONE (OUTPATIENT)
Dept: CARDIOLOGY | Facility: CLINIC | Age: 85
End: 2025-07-08
Payer: MEDICARE

## 2025-07-08 ENCOUNTER — TREATMENT (OUTPATIENT)
Dept: CARDIAC REHAB | Facility: HOSPITAL | Age: 85
End: 2025-07-08
Payer: MEDICARE

## 2025-07-08 DIAGNOSIS — Z95.5 STATUS POST CORONARY ARTERY STENT PLACEMENT: Primary | ICD-10-CM

## 2025-07-08 PROCEDURE — 93798 PHYS/QHP OP CAR RHAB W/ECG: CPT

## 2025-07-08 NOTE — PROGRESS NOTES
Pt was seen today in CR for a Phase II visit. Vital signs and session notes recorded in [employer] and will be scanned into Epic by HIM.

## 2025-07-10 ENCOUNTER — TREATMENT (OUTPATIENT)
Dept: CARDIAC REHAB | Facility: HOSPITAL | Age: 85
End: 2025-07-10
Payer: MEDICARE

## 2025-07-10 DIAGNOSIS — Z95.5 STATUS POST CORONARY ARTERY STENT PLACEMENT: Primary | ICD-10-CM

## 2025-07-10 PROCEDURE — 93798 PHYS/QHP OP CAR RHAB W/ECG: CPT

## 2025-07-10 NOTE — PROGRESS NOTES
Pt was seen today in CR for a Phase 2 visit.  Vital signs and session notes recorded in [employer] and will be scanned into Epic by HIM.

## 2025-07-14 RX ORDER — CLOPIDOGREL BISULFATE 75 MG/1
75 TABLET ORAL DAILY
Qty: 90 TABLET | Refills: 0 | Status: SHIPPED | OUTPATIENT
Start: 2025-07-14

## 2025-07-15 ENCOUNTER — TREATMENT (OUTPATIENT)
Dept: CARDIAC REHAB | Facility: HOSPITAL | Age: 85
End: 2025-07-15
Payer: MEDICARE

## 2025-07-15 DIAGNOSIS — Z95.5 STATUS POST CORONARY ARTERY STENT PLACEMENT: Primary | ICD-10-CM

## 2025-07-15 PROCEDURE — 93798 PHYS/QHP OP CAR RHAB W/ECG: CPT

## 2025-07-15 NOTE — PROGRESS NOTES
Pt was seen today in CR for a Phase 2 visit.  Vital signs and session notes recorded in [employer] and will be scanned into Epic by HIM.

## 2025-07-17 ENCOUNTER — TREATMENT (OUTPATIENT)
Dept: CARDIAC REHAB | Facility: HOSPITAL | Age: 85
End: 2025-07-17
Payer: MEDICARE

## 2025-07-17 DIAGNOSIS — Z95.5 STATUS POST CORONARY ARTERY STENT PLACEMENT: Primary | ICD-10-CM

## 2025-07-17 PROCEDURE — 93798 PHYS/QHP OP CAR RHAB W/ECG: CPT

## 2025-07-17 NOTE — PROGRESS NOTES
Pt was seen today in CR for a Phase 2 visit.  Vital signs and session notes recorded in [employer] and will be scanned into Epic by HIM.

## 2025-07-22 ENCOUNTER — TREATMENT (OUTPATIENT)
Dept: CARDIAC REHAB | Facility: HOSPITAL | Age: 85
End: 2025-07-22
Payer: MEDICARE

## 2025-07-22 DIAGNOSIS — Z95.5 STATUS POST CORONARY ARTERY STENT PLACEMENT: Primary | ICD-10-CM

## 2025-07-22 PROCEDURE — 93798 PHYS/QHP OP CAR RHAB W/ECG: CPT

## 2025-07-22 NOTE — PROGRESS NOTES
Pt was seen today in CR for a Phase 2 visit.  Vital signs and session notes recorded in [employer] and will be scanned into Epic by HIM.

## (undated) DEVICE — KT CATH IMG DRAGONFLY/OPSTAR 2.7F 135CM

## (undated) DEVICE — CATH F6 ST JL 3.5 100CM: Brand: SUPERTORQUE

## (undated) DEVICE — GUIDE CATHETER: Brand: MACH1™

## (undated) DEVICE — NC TREK NEO™ CORONARY DILATATION CATHETER 3.50 MM X 20 MM / RAPID-EXCHANGE: Brand: NC TREK NEO™

## (undated) DEVICE — PERCLOSE™ PROSTYLE™ SUTURE-MEDIATED CLOSURE AND REPAIR SYSTEM: Brand: PERCLOSE™ PROSTYLE™

## (undated) DEVICE — PINNACLE INTRODUCER SHEATH: Brand: PINNACLE

## (undated) DEVICE — NC TREK NEO™ CORONARY DILATATION CATHETER 3.00 MM X 20 MM / RAPID-EXCHANGE: Brand: NC TREK NEO™

## (undated) DEVICE — GW PERIPH VASC ADX J/TP SS .035 150CM 3MM

## (undated) DEVICE — TREK CORONARY DILATATION CATHETER 2.50 MM X 15 MM / RAPID-EXCHANGE: Brand: TREK

## (undated) DEVICE — DGW .035 FC J3MM 260CM TEF: Brand: EMERALD

## (undated) DEVICE — INFLATION DEVICE KIT: Brand: ENCORE™ 26 ADVANTAGE KIT

## (undated) DEVICE — TRANSPAC™ IV MONITORING KIT W/SAFESET™60" ARTERIAL PRESSURE TUBING, RESERVOIR, 03 ML SQUEEZE FLUSH AND 2 NEEDLELESS VALVES: Brand: ICU MEDICAL

## (undated) DEVICE — ADULT, W/LG. BACK PAD, RADIOTRANSPARENT ELEMENT AND LEAD WIRE COMPATIBLE W/: Brand: DEFIBRILLATION ELECTRODES

## (undated) DEVICE — DEV INFL MONARCH 25W

## (undated) DEVICE — GLIDESHEATH SLENDER STAINLESS STEEL KIT: Brand: GLIDESHEATH SLENDER

## (undated) DEVICE — CVR PROB ULTRASND/TRANSD W/GEL 7X11IN STRL

## (undated) DEVICE — PK CATH CARD 10

## (undated) DEVICE — TR BAND RADIAL ARTERY COMPRESSION DEVICE: Brand: TR BAND

## (undated) DEVICE — CATH BALN INTRAVASC/LITHO C2PLUS 3X12MM

## (undated) DEVICE — MODEL BT2000 P/N 700287-012KIT CONTENTS: MANIFOLD WITH SALINE AND CONTRAST PORTS, SALINE TUBING WITH SPIKE AND HAND SYRINGE, TRANSDUCER: Brand: BT2000 AUTOMATED MANIFOLD KIT

## (undated) DEVICE — CATH F6INF TL AR MOD 100CM: Brand: INFINITI

## (undated) DEVICE — HI-TORQUE BALANCE MIDDLEWEIGHT UNIVERSAL GUIDE WIRE .014 STRAIGHT TIP 3.0 CM X 190 CM: Brand: HI-TORQUE BALANCE MIDDLEWEIGHT UNIVERSAL

## (undated) DEVICE — ST ACC MICROPUNCTURE STFF .018 ECHO/PLAT/TP 4F/10CM 21G/7CM

## (undated) DEVICE — GW WWIJ35260 WHOLEY WIRE V04: Brand: WHOLEY™

## (undated) DEVICE — ST ACC MICROPUNCTURE .018 TRANSLSS/SS/TP 5F/10CM 21G/7CM

## (undated) DEVICE — Device: Brand: ASAHI SION BLUE

## (undated) DEVICE — CATH F6 ST JR 4 100CM: Brand: SUPERTORQUE